# Patient Record
Sex: MALE | Race: WHITE | NOT HISPANIC OR LATINO | Employment: FULL TIME | URBAN - METROPOLITAN AREA
[De-identification: names, ages, dates, MRNs, and addresses within clinical notes are randomized per-mention and may not be internally consistent; named-entity substitution may affect disease eponyms.]

---

## 2017-02-20 ENCOUNTER — OFFICE VISIT (OUTPATIENT)
Dept: FAMILY MEDICINE | Facility: CLINIC | Age: 52
End: 2017-02-20
Payer: COMMERCIAL

## 2017-02-20 VITALS
TEMPERATURE: 99 F | WEIGHT: 247.69 LBS | OXYGEN SATURATION: 95 % | DIASTOLIC BLOOD PRESSURE: 70 MMHG | HEART RATE: 78 BPM | SYSTOLIC BLOOD PRESSURE: 133 MMHG | HEIGHT: 71 IN | BODY MASS INDEX: 34.68 KG/M2

## 2017-02-20 DIAGNOSIS — H65.193 ACUTE MUCOID OTITIS MEDIA OF BOTH EARS: Primary | ICD-10-CM

## 2017-02-20 PROCEDURE — 3078F DIAST BP <80 MM HG: CPT | Mod: S$GLB,,, | Performed by: NURSE PRACTITIONER

## 2017-02-20 PROCEDURE — 1160F RVW MEDS BY RX/DR IN RCRD: CPT | Mod: S$GLB,,, | Performed by: NURSE PRACTITIONER

## 2017-02-20 PROCEDURE — 3075F SYST BP GE 130 - 139MM HG: CPT | Mod: S$GLB,,, | Performed by: NURSE PRACTITIONER

## 2017-02-20 PROCEDURE — 99214 OFFICE O/P EST MOD 30 MIN: CPT | Mod: S$GLB,,, | Performed by: NURSE PRACTITIONER

## 2017-02-20 PROCEDURE — 99999 PR PBB SHADOW E&M-EST. PATIENT-LVL IV: CPT | Mod: PBBFAC,,, | Performed by: NURSE PRACTITIONER

## 2017-02-20 RX ORDER — CIPROFLOXACIN AND DEXAMETHASONE 3; 1 MG/ML; MG/ML
4 SUSPENSION/ DROPS AURICULAR (OTIC) 2 TIMES DAILY
Qty: 7.5 ML | Refills: 0 | Status: SHIPPED | OUTPATIENT
Start: 2017-02-20 | End: 2017-02-27

## 2017-02-20 RX ORDER — BENZONATATE 200 MG/1
200 CAPSULE ORAL EVERY 8 HOURS PRN
Qty: 30 CAPSULE | Refills: 0 | Status: SHIPPED | OUTPATIENT
Start: 2017-02-20 | End: 2017-03-02

## 2017-02-20 RX ORDER — LEVOFLOXACIN 500 MG/1
500 TABLET, FILM COATED ORAL DAILY
Qty: 7 TABLET | Refills: 0 | Status: SHIPPED | OUTPATIENT
Start: 2017-02-20 | End: 2017-02-27

## 2017-02-20 RX ORDER — LEVOCETIRIZINE DIHYDROCHLORIDE 5 MG/1
5 TABLET, FILM COATED ORAL NIGHTLY
Qty: 30 TABLET | Refills: 0 | Status: SHIPPED | OUTPATIENT
Start: 2017-02-20 | End: 2020-12-23 | Stop reason: ALTCHOICE

## 2017-02-20 NOTE — MR AVS SNAPSHOT
[facility]  [address]  [place] [address]  Phone: [phone]  Fax: [phone]                  Derek Joseph Weil   2017 3:15 PM   Office Visit    Description:  Male : 1965   Provider:  Melissa Cm NP   Department:  [facility]           Reason for Visit     LEFT EARACHE           Diagnoses this Visit        Comments    Acute mucoid otitis media of both ears    -  Primary            To Do List           Goals (5 Years of Data)     None      Follow-Up and Disposition     Return if symptoms worsen or fail to improve in 3-5 days.       These Medications        Disp Refills Start End    levoFLOXacin (LEVAQUIN) 500 MG tablet 7 tablet 0 2017    Take 1 tablet (500 mg total) by mouth once daily. - Oral    Pharmacy: [facility] [address] Ph #: [phone]       ciprofloxacin-dexamethasone 0.3-0.1% (CIPRODEX) 0.3-0.1 % DrpS 7.5 mL 0 2017    Place 4 drops into both ears 2 (two) times daily. - Both Ears    Pharmacy: [facility] [address] Ph #: [phone]       benzonatate (TESSALON) 200 MG capsule 30 capsule 0 2017 3/2/2017    Take 1 capsule (200 mg total) by mouth every 8 (eight) hours as needed for Cough. - Oral    Pharmacy: [facility] [address] Ph #: [phone]       levocetirizine (XYZAL) 5 MG tablet 30 tablet 0 2017    Take 1 tablet (5 mg total) by mouth every evening. - Oral    Pharmacy: [facility] [address] Ph #: [phone]         [facility]     [facility] -  Assistance  Registered nurses in the [facility] provide clinical advisement, health education, appointment booking, and other advisory services.  Call  for this free service at [phone].             Medications           Message regarding Medications     Verify the changes and/or additions to your medication regime listed below are the same as discussed with your clinician today.  If any of these changes or additions are incorrect, please notify your healthcare provider.        START taking these NEW medications        Refills    levoFLOXacin (LEVAQUIN) 500 MG tablet 0    Sig: Take 1 tablet (500 mg total) by mouth once daily.    Class: Normal    Route: Oral    ciprofloxacin-dexamethasone 0.3-0.1% (CIPRODEX) 0.3-0.1 % DrpS 0    Sig: Place 4 drops into both ears 2 (two) times daily.    Class: Normal    Route: Both Ears    benzonatate (TESSALON) 200 MG capsule 0    Sig: Take 1 capsule (200 mg total) by mouth every 8 (eight) hours as needed for Cough.    Class: Normal    Route: Oral    levocetirizine (XYZAL) 5 MG tablet 0    Sig: Take 1 tablet (5 mg total) by mouth every evening.    Class: Normal    Route: Oral           Verify that the below list of medications is an accurate representation of the medications you are currently taking.  If none reported, the list may be blank. If incorrect, please contact your healthcare provider. Carry this list with you in case of emergency.           Current Medications     acetaminophen (TYLENOL) 325 MG tablet Take by mouth. 2 Tablet Oral Every 4 hours    albuterol (PROVENTIL) 2.5 mg /3 mL (0.083 %) nebulizer solution Take 3 mLs (2.5 mg total) by nebulization every 6 (six) hours as needed for Wheezing or Shortness of Breath (also PRN cough).    amlodipine (NORVASC) 5 MG tablet Take by mouth. 1 Tablet Oral Every evening    atorvastatin (LIPITOR) 10 MG tablet     epinephrine (EPIPEN) 0.3 mg/0.3 mL AtIn Inject 0.3 mLs (0.3 mg total) into the muscle as needed (severe allergic reaction).    famotidine (PEPCID) 20 MG tablet Take 1 tablet (20 mg total) by mouth 2 (two) times daily.    nebivolol (BYSTOLIC) 5 MG Tab Take by mouth. 1  "Tablet Oral Every day    benzonatate (TESSALON) 200 MG capsule Take 1 capsule (200 mg total) by mouth every 8 (eight) hours as needed for Cough.    ciprofloxacin-dexamethasone 0.3-0.1% (CIPRODEX) 0.3-0.1 % DrpS Place 4 drops into both ears 2 (two) times daily.    levocetirizine (XYZAL) 5 MG tablet Take 1 tablet (5 mg total) by mouth every evening.    levoFLOXacin (LEVAQUIN) 500 MG tablet Take 1 tablet (500 mg total) by mouth once daily.           Clinical Reference Information           Your Vitals Were     BP Pulse Temp Height Weight SpO2    133/70 (BP Location: Right arm, Patient Position: Sitting, BP Method: Manual) 78 98.8 °F (37.1 °C) (Oral) 5' 11" (1.803 m) 112.4 kg (247 lb 11 oz) 95%    BMI                34.55 kg/m2          Blood Pressure          Most Recent Value    BP  133/70      Allergies as of 2/20/2017     Nsaids (Non-steroidal Anti-inflammatory Drug)    Penicillins    Aspirin    Ibuprofen    Phenytoin Sodium Extended      Immunizations Administered on Date of Encounter - 2/20/2017     None      [facility] Sign-Up     Activating your [facility] account is as easy as 1-2-3!     1) Visit [email or web address], select Sign Up Now, enter this activation code and your date of birth, then select Next.  [email or web address]  Expires: 4/6/2017  4:49 PM      2) Create a username and password to use when you visit [facility] in the future and select a security question in case you lose your password and select Next.    3) Enter your e-mail address and click Sign Up!    Additional Information  If you have questions, please e-mail [email or web address] or call [phone] to talk to our [facility] staff. Remember, [facility] is NOT to be used for urgent needs. For medical emergencies, dial 911.         Instructions      Middle Ear Infection (Adult)  You have an infection of the middle ear, the space behind the eardrum. This is also called acute otitis media (AOM). Sometimes it is caused by the common cold. This is because " congestion can block the internal passage (eustachian tube) that drains fluid from the middle ear. When the middle ear fills with fluid, bacteria can grow there and cause an infection. Oral antibiotics are used to treat this illness, not ear drops. Symptoms usually start to improve within 1 to 2 days of treatment.    Home care  The following are general care guidelines:  · Finish all of the antibiotic medicine given, even though you may feel better after the first few days.  · You may use over-the-counter medicine, such as acetaminophen or ibuprofen, to control pain and fever, unless something else was prescribed. If you have chronic liver or kidney disease or have ever had a stomach ulcer or gastrointestinal bleeding, talk with your healthcare provider before using these medicines. Do not give aspirin to anyone under 18 years of age who has a fever. It may cause severe illness or death.  Follow-up care  Follow up with your healthcare provider, or as advised, in 2 weeks if all symptoms have not gotten better, or if hearing doesn't go back to normal within 1 month.  When to seek medical advice  Call your healthcare provider right away if any of these occur:  · Ear pain gets worse or does not improve after 3 days of treatment  · Unusual drowsiness or confusion  · Neck pain, stiff neck, or headache  · Fluid or blood draining from the ear canal  · Fever of 100.4°F (38°C) or as advised   · Seizure  Date Last Reviewed: 6/1/2016 © 2000-2016 [employer]. [address] [place] [address]. All rights reserved. This information is not intended as a substitute for professional medical care. Always follow your healthcare professional's instructions.             Language Assistance Services     ATTENTION: Language assistance services are available, free of charge. Please call [phone].      ATENCIÓN: Si mishala carlos alberto, tiene a burch disposición servicios gratuitos de asistencia lingüística. Llame al  [phone].     RE Ý: N?u b?n nói Ti?ng Vi?t, có các d?ch v? h? tr? ngôn ng? mi?n phí dành cho b?n. G?i s? [phone].         [facility] complies with applicable Federal civil rights laws and does not discriminate on the basis of race, color, national origin, age, disability, or sex.

## 2017-02-20 NOTE — PATIENT INSTRUCTIONS
Middle Ear Infection (Adult)  You have an infection of the middle ear, the space behind the eardrum. This is also called acute otitis media (AOM). Sometimes it is caused by the common cold. This is because congestion can block the internal passage (eustachian tube) that drains fluid from the middle ear. When the middle ear fills with fluid, bacteria can grow there and cause an infection. Oral antibiotics are used to treat this illness, not ear drops. Symptoms usually start to improve within 1 to 2 days of treatment.    Home care  The following are general care guidelines:  · Finish all of the antibiotic medicine given, even though you may feel better after the first few days.  · You may use over-the-counter medicine, such as acetaminophen or ibuprofen, to control pain and fever, unless something else was prescribed. If you have chronic liver or kidney disease or have ever had a stomach ulcer or gastrointestinal bleeding, talk with your healthcare provider before using these medicines. Do not give aspirin to anyone under 18 years of age who has a fever. It may cause severe illness or death.  Follow-up care  Follow up with your healthcare provider, or as advised, in 2 weeks if all symptoms have not gotten better, or if hearing doesn't go back to normal within 1 month.  When to seek medical advice  Call your healthcare provider right away if any of these occur:  · Ear pain gets worse or does not improve after 3 days of treatment  · Unusual drowsiness or confusion  · Neck pain, stiff neck, or headache  · Fluid or blood draining from the ear canal  · Fever of 100.4°F (38°C) or as advised   · Seizure  Date Last Reviewed: 6/1/2016  © 5738-1958 [employer]. [address] [place] [address], [place] [address]. All rights reserved. This information is not intended as a substitute for professional medical care. Always follow your healthcare professional's instructions.

## 2017-02-20 NOTE — PROGRESS NOTES
Patient Name: Derek Joseph Weil    : 1965  MRN: [id number]    Subjective:  Alexander is a 51 y.o. male who presents today for     1. Ear pain left started 6 oclock day prior, stuffy nose, coughing x 1 week, taking earache otc drops, tylenol as needed ,     Past Medical History  Past Medical History   Diagnosis Date    Hypercholesteremia     Hypertension        Past Surgical History  Past Surgical History   Procedure Laterality Date    Appendectomy         Family History  Family History   Problem Relation Age of Onset    Family history unknown: Yes       Social History  Social History     Social History    Marital status:      Spouse name: N/A    Number of children: N/A    Years of education: N/A     Occupational History    Not on file.     Social History Main Topics    Smoking status: Never Smoker    Smokeless tobacco: Not on file    Alcohol use No    Drug use: No    Sexual activity: Not on file     Other Topics Concern    Not on file     Social History Narrative       Allergies  Review of patient's allergies indicates:   Allergen Reactions    Nsaids (non-steroidal anti-inflammatory drug) Anaphylaxis    Penicillins Anaphylaxis     Other reaction(s): Swelling    Aspirin Swelling     Other reaction(s): Swelling of throat, hives    Ibuprofen Hives     Other reaction(s): Swelling of throat, hives    Phenytoin sodium extended      Other reaction(s): Swelling    -reviewed and updated      Medications  Reviewed and updated.   Current Outpatient Prescriptions   Medication Sig Dispense Refill    acetaminophen (TYLENOL) 325 MG tablet Take by mouth. 2 Tablet Oral Every 4 hours      albuterol (PROVENTIL) 2.5 mg /3 mL (0.083 %) nebulizer solution Take 3 mLs (2.5 mg total) by nebulization every 6 (six) hours as needed for Wheezing or Shortness of Breath (also PRN cough). 1 Box 2    amlodipine (NORVASC) 5 MG tablet Take by mouth. 1 Tablet Oral Every evening      atorvastatin (LIPITOR) 10 MG tablet    "    epinephrine (EPIPEN) 0.3 mg/0.3 mL AtIn Inject 0.3 mLs (0.3 mg total) into the muscle as needed (severe allergic reaction). 2 each 0    famotidine (PEPCID) 20 MG tablet Take 1 tablet (20 mg total) by mouth 2 (two) times daily. 20 tablet 0    nebivolol (BYSTOLIC) 5 MG Tab Take by mouth. 1 Tablet Oral Every day      benzonatate (TESSALON) 200 MG capsule Take 1 capsule (200 mg total) by mouth every 8 (eight) hours as needed for Cough. 30 capsule 0    ciprofloxacin-dexamethasone 0.3-0.1% (CIPRODEX) 0.3-0.1 % DrpS Place 4 drops into both ears 2 (two) times daily. 7.5 mL 0    levocetirizine (XYZAL) 5 MG tablet Take 1 tablet (5 mg total) by mouth every evening. 30 tablet 0    levoFLOXacin (LEVAQUIN) 500 MG tablet Take 1 tablet (500 mg total) by mouth once daily. 7 tablet 0     No current facility-administered medications for this visit.          Review of Systems   Constitutional: Positive for chills and fever (102 fever that resting, tylenol).   HENT: Positive for congestion, ear pain and sore throat (sunday morning).    Respiratory: Positive for cough and shortness of breath. Negative for sputum production and wheezing.    Cardiovascular: Negative for chest pain.   Gastrointestinal: Positive for constipation (x 4-5 days).   Neurological: Positive for headaches (left throbbing).         Physical Exam  Visit Vitals    /70 (BP Location: Right arm, Patient Position: Sitting, BP Method: Manual)    Pulse 78    Temp 98.8 °F (37.1 °C) (Oral)    Ht 5' 11" (1.803 m)    Wt 112.4 kg (247 lb 11 oz)    SpO2 95%    BMI 34.55 kg/m2     Physical Exam   Constitutional: He appears well-developed. No distress.   HENT:   Head: Normocephalic.   Right Ear: Tympanic membrane is erythematous.   Left Ear: Tympanic membrane is perforated and erythematous.   Nose: Mucosal edema present.   Mouth/Throat: Posterior oropharyngeal erythema present.   Cardiovascular: Normal rate, regular rhythm and normal heart sounds.  "   Pulmonary/Chest: Effort normal and breath sounds normal.   Skin: He is not diaphoretic.         Assessment/Plan:  Derek Joseph Weil is a 51 y.o. male who presents today for :    Acute mucoid otitis media of both ears  -     levoFLOXacin (LEVAQUIN) 500 MG tablet; Take 1 tablet (500 mg total) by mouth once daily.  Dispense: 7 tablet; Refill: 0  -     ciprofloxacin-dexamethasone 0.3-0.1% (CIPRODEX) 0.3-0.1 % DrpS; Place 4 drops into both ears 2 (two) times daily.  Dispense: 7.5 mL; Refill: 0  -     benzonatate (TESSALON) 200 MG capsule; Take 1 capsule (200 mg total) by mouth every 8 (eight) hours as needed for Cough.  Dispense: 30 capsule; Refill: 0  -     levocetirizine (XYZAL) 5 MG tablet; Take 1 tablet (5 mg total) by mouth every evening.  Dispense: 30 tablet; Refill: 0      Return if symptoms worsen or fail to improve in 3-5 days.

## 2017-02-20 NOTE — LETTER
February 20, 2017      Alexanderk Weil   [address] [place] [address]             [place] [address]  [place] [address]  Phone: [phone]  Fax: [phone] Derek Weil    Was treated here on 02/20/2017    May Return to work/school on 2/22/2017 if symptoms improve.                Melissa Cm, NP

## 2017-03-09 DIAGNOSIS — J06.9 VIRAL URI: ICD-10-CM

## 2017-03-09 RX ORDER — ALBUTEROL SULFATE 0.83 MG/ML
SOLUTION RESPIRATORY (INHALATION)
Qty: 180 ML | Refills: 0 | Status: SHIPPED | OUTPATIENT
Start: 2017-03-09 | End: 2020-12-23 | Stop reason: ALTCHOICE

## 2017-07-11 ENCOUNTER — PATIENT OUTREACH (OUTPATIENT)
Dept: ADMINISTRATIVE | Facility: HOSPITAL | Age: 52
End: 2017-07-11

## 2020-11-02 ENCOUNTER — HOSPITAL ENCOUNTER (INPATIENT)
Facility: HOSPITAL | Age: 55
LOS: 9 days | Discharge: HOME | DRG: 177 | End: 2020-11-11
Attending: EMERGENCY MEDICINE | Admitting: EMERGENCY MEDICINE
Payer: COMMERCIAL

## 2020-11-02 DIAGNOSIS — U07.1 PNEUMONIA DUE TO COVID-19 VIRUS: ICD-10-CM

## 2020-11-02 DIAGNOSIS — Z20.822 SUSPECTED COVID-19 VIRUS INFECTION: ICD-10-CM

## 2020-11-02 DIAGNOSIS — R09.02 HYPOXIA: ICD-10-CM

## 2020-11-02 DIAGNOSIS — G47.33 OSA ON CPAP: ICD-10-CM

## 2020-11-02 DIAGNOSIS — R07.9 CHEST PAIN: ICD-10-CM

## 2020-11-02 DIAGNOSIS — I10 ESSENTIAL HYPERTENSION: ICD-10-CM

## 2020-11-02 DIAGNOSIS — J12.82 PNEUMONIA DUE TO COVID-19 VIRUS: ICD-10-CM

## 2020-11-02 DIAGNOSIS — J18.9 PNEUMONIA OF BOTH LOWER LOBES DUE TO INFECTIOUS ORGANISM: Primary | ICD-10-CM

## 2020-11-02 DIAGNOSIS — R53.1 WEAKNESS: ICD-10-CM

## 2020-11-02 DIAGNOSIS — E87.70 VOLUME OVERLOAD: ICD-10-CM

## 2020-11-02 LAB
ALBUMIN SERPL BCP-MCNC: 3.5 G/DL (ref 3.5–5.2)
ALP SERPL-CCNC: 39 U/L (ref 55–135)
ALT SERPL W/O P-5'-P-CCNC: 28 U/L (ref 10–44)
ANION GAP SERPL CALC-SCNC: 11 MMOL/L (ref 8–16)
AST SERPL-CCNC: 32 U/L (ref 10–40)
BASOPHILS # BLD AUTO: 0.02 K/UL (ref 0–0.2)
BASOPHILS NFR BLD: 0.3 % (ref 0–1.9)
BILIRUB SERPL-MCNC: 0.8 MG/DL (ref 0.1–1)
BNP SERPL-MCNC: 13 PG/ML (ref 0–99)
BUN SERPL-MCNC: 14 MG/DL (ref 6–20)
CALCIUM SERPL-MCNC: 8.8 MG/DL (ref 8.7–10.5)
CHLORIDE SERPL-SCNC: 98 MMOL/L (ref 95–110)
CK SERPL-CCNC: 592 U/L (ref 20–200)
CO2 SERPL-SCNC: 23 MMOL/L (ref 23–29)
CREAT SERPL-MCNC: 1.2 MG/DL (ref 0.5–1.4)
CRP SERPL-MCNC: 112.4 MG/L (ref 0–8.2)
CTP QC/QA: YES
CTP QC/QA: YES
DIFFERENTIAL METHOD: ABNORMAL
EOSINOPHIL # BLD AUTO: 0 K/UL (ref 0–0.5)
EOSINOPHIL NFR BLD: 0.1 % (ref 0–8)
ERYTHROCYTE [DISTWIDTH] IN BLOOD BY AUTOMATED COUNT: 12.5 % (ref 11.5–14.5)
EST. GFR  (AFRICAN AMERICAN): >60 ML/MIN/1.73 M^2
EST. GFR  (NON AFRICAN AMERICAN): >60 ML/MIN/1.73 M^2
FERRITIN SERPL-MCNC: 789 NG/ML (ref 20–300)
GLUCOSE SERPL-MCNC: 108 MG/DL (ref 70–110)
HCT VFR BLD AUTO: 45.2 % (ref 40–54)
HGB BLD-MCNC: 15.7 G/DL (ref 14–18)
IMM GRANULOCYTES # BLD AUTO: 0.02 K/UL (ref 0–0.04)
IMM GRANULOCYTES NFR BLD AUTO: 0.3 % (ref 0–0.5)
LACTATE SERPL-SCNC: 1.3 MMOL/L (ref 0.5–2.2)
LDH SERPL L TO P-CCNC: 346 U/L (ref 110–260)
LYMPHOCYTES # BLD AUTO: 0.6 K/UL (ref 1–4.8)
LYMPHOCYTES NFR BLD: 8.1 % (ref 18–48)
MCH RBC QN AUTO: 30.9 PG (ref 27–31)
MCHC RBC AUTO-ENTMCNC: 34.7 G/DL (ref 32–36)
MCV RBC AUTO: 89 FL (ref 82–98)
MONOCYTES # BLD AUTO: 0.6 K/UL (ref 0.3–1)
MONOCYTES NFR BLD: 8.3 % (ref 4–15)
NEUTROPHILS # BLD AUTO: 6.3 K/UL (ref 1.8–7.7)
NEUTROPHILS NFR BLD: 82.9 % (ref 38–73)
NRBC BLD-RTO: 0 /100 WBC
PLATELET # BLD AUTO: 159 K/UL (ref 150–350)
PMV BLD AUTO: 10.1 FL (ref 9.2–12.9)
POC MOLECULAR INFLUENZA A AGN: NEGATIVE
POC MOLECULAR INFLUENZA B AGN: NEGATIVE
POTASSIUM SERPL-SCNC: 3.6 MMOL/L (ref 3.5–5.1)
PROT SERPL-MCNC: 7.4 G/DL (ref 6–8.4)
RBC # BLD AUTO: 5.08 M/UL (ref 4.6–6.2)
SARS-COV-2 RDRP RESP QL NAA+PROBE: POSITIVE
SODIUM SERPL-SCNC: 132 MMOL/L (ref 136–145)
TROPONIN I SERPL DL<=0.01 NG/ML-MCNC: 0.02 NG/ML (ref 0–0.03)
WBC # BLD AUTO: 7.56 K/UL (ref 3.9–12.7)

## 2020-11-02 PROCEDURE — 87502 INFLUENZA DNA AMP PROBE: CPT

## 2020-11-02 PROCEDURE — 82550 ASSAY OF CK (CPK): CPT

## 2020-11-02 PROCEDURE — 25000003 PHARM REV CODE 250: Performed by: INTERNAL MEDICINE

## 2020-11-02 PROCEDURE — 86140 C-REACTIVE PROTEIN: CPT

## 2020-11-02 PROCEDURE — 99285 PR EMERGENCY DEPT VISIT,LEVEL V: ICD-10-PCS | Mod: ,,, | Performed by: EMERGENCY MEDICINE

## 2020-11-02 PROCEDURE — 83605 ASSAY OF LACTIC ACID: CPT

## 2020-11-02 PROCEDURE — 94640 AIRWAY INHALATION TREATMENT: CPT

## 2020-11-02 PROCEDURE — 63600175 PHARM REV CODE 636 W HCPCS: Performed by: EMERGENCY MEDICINE

## 2020-11-02 PROCEDURE — U0002 COVID-19 LAB TEST NON-CDC: HCPCS | Performed by: EMERGENCY MEDICINE

## 2020-11-02 PROCEDURE — 99233 PR SUBSEQUENT HOSPITAL CARE,LEVL III: ICD-10-PCS | Mod: ,,, | Performed by: INTERNAL MEDICINE

## 2020-11-02 PROCEDURE — 87040 BLOOD CULTURE FOR BACTERIA: CPT

## 2020-11-02 PROCEDURE — 83880 ASSAY OF NATRIURETIC PEPTIDE: CPT

## 2020-11-02 PROCEDURE — 99285 EMERGENCY DEPT VISIT HI MDM: CPT | Mod: 25

## 2020-11-02 PROCEDURE — 99285 EMERGENCY DEPT VISIT HI MDM: CPT | Mod: ,,, | Performed by: EMERGENCY MEDICINE

## 2020-11-02 PROCEDURE — 63600175 PHARM REV CODE 636 W HCPCS: Performed by: INTERNAL MEDICINE

## 2020-11-02 PROCEDURE — 80053 COMPREHEN METABOLIC PANEL: CPT

## 2020-11-02 PROCEDURE — 85025 COMPLETE CBC W/AUTO DIFF WBC: CPT

## 2020-11-02 PROCEDURE — 94761 N-INVAS EAR/PLS OXIMETRY MLT: CPT

## 2020-11-02 PROCEDURE — 93010 ELECTROCARDIOGRAM REPORT: CPT | Mod: ,,, | Performed by: INTERNAL MEDICINE

## 2020-11-02 PROCEDURE — 82728 ASSAY OF FERRITIN: CPT

## 2020-11-02 PROCEDURE — 20600001 HC STEP DOWN PRIVATE ROOM

## 2020-11-02 PROCEDURE — 93010 EKG 12-LEAD: ICD-10-PCS | Mod: ,,, | Performed by: INTERNAL MEDICINE

## 2020-11-02 PROCEDURE — 96365 THER/PROPH/DIAG IV INF INIT: CPT

## 2020-11-02 PROCEDURE — 25000242 PHARM REV CODE 250 ALT 637 W/ HCPCS: Performed by: INTERNAL MEDICINE

## 2020-11-02 PROCEDURE — 99233 SBSQ HOSP IP/OBS HIGH 50: CPT | Mod: ,,, | Performed by: INTERNAL MEDICINE

## 2020-11-02 PROCEDURE — 83615 LACTATE (LD) (LDH) ENZYME: CPT

## 2020-11-02 PROCEDURE — 93005 ELECTROCARDIOGRAM TRACING: CPT

## 2020-11-02 PROCEDURE — 84484 ASSAY OF TROPONIN QUANT: CPT

## 2020-11-02 PROCEDURE — 25000003 PHARM REV CODE 250: Performed by: EMERGENCY MEDICINE

## 2020-11-02 RX ORDER — LEVOFLOXACIN 5 MG/ML
750 INJECTION, SOLUTION INTRAVENOUS
Status: DISCONTINUED | OUTPATIENT
Start: 2020-11-02 | End: 2020-11-02

## 2020-11-02 RX ORDER — IBUPROFEN 200 MG
16 TABLET ORAL
Status: DISCONTINUED | OUTPATIENT
Start: 2020-11-02 | End: 2020-11-11 | Stop reason: HOSPADM

## 2020-11-02 RX ORDER — ATORVASTATIN CALCIUM 20 MG/1
40 TABLET, FILM COATED ORAL NIGHTLY
Status: DISCONTINUED | OUTPATIENT
Start: 2020-11-02 | End: 2020-11-02

## 2020-11-02 RX ORDER — ENOXAPARIN SODIUM 100 MG/ML
40 INJECTION SUBCUTANEOUS EVERY 24 HOURS
Status: DISCONTINUED | OUTPATIENT
Start: 2020-11-02 | End: 2020-11-11 | Stop reason: HOSPADM

## 2020-11-02 RX ORDER — NEBIVOLOL 5 MG/1
5 TABLET ORAL DAILY
Status: DISCONTINUED | OUTPATIENT
Start: 2020-11-02 | End: 2020-11-02

## 2020-11-02 RX ORDER — SODIUM CHLORIDE 0.9 % (FLUSH) 0.9 %
10 SYRINGE (ML) INJECTION
Status: DISCONTINUED | OUTPATIENT
Start: 2020-11-02 | End: 2020-11-11 | Stop reason: HOSPADM

## 2020-11-02 RX ORDER — HYDROXYZINE PAMOATE 25 MG/1
50 CAPSULE ORAL NIGHTLY PRN
Status: DISCONTINUED | OUTPATIENT
Start: 2020-11-02 | End: 2020-11-11 | Stop reason: HOSPADM

## 2020-11-02 RX ORDER — TALC
6 POWDER (GRAM) TOPICAL NIGHTLY PRN
Status: DISCONTINUED | OUTPATIENT
Start: 2020-11-02 | End: 2020-11-11 | Stop reason: HOSPADM

## 2020-11-02 RX ORDER — BENZONATATE 100 MG/1
100 CAPSULE ORAL 3 TIMES DAILY PRN
Status: DISCONTINUED | OUTPATIENT
Start: 2020-11-02 | End: 2020-11-11 | Stop reason: HOSPADM

## 2020-11-02 RX ORDER — GUAIFENESIN/DEXTROMETHORPHAN 100-10MG/5
5 SYRUP ORAL EVERY 6 HOURS
Status: DISCONTINUED | OUTPATIENT
Start: 2020-11-02 | End: 2020-11-04

## 2020-11-02 RX ORDER — GLUCAGON 1 MG
1 KIT INJECTION
Status: DISCONTINUED | OUTPATIENT
Start: 2020-11-02 | End: 2020-11-11 | Stop reason: HOSPADM

## 2020-11-02 RX ORDER — ONDANSETRON 2 MG/ML
4 INJECTION INTRAMUSCULAR; INTRAVENOUS EVERY 8 HOURS PRN
Status: DISCONTINUED | OUTPATIENT
Start: 2020-11-02 | End: 2020-11-11 | Stop reason: HOSPADM

## 2020-11-02 RX ORDER — NEBIVOLOL 2.5 MG/1
2.5 TABLET ORAL DAILY
Status: DISCONTINUED | OUTPATIENT
Start: 2020-11-02 | End: 2020-11-11 | Stop reason: HOSPADM

## 2020-11-02 RX ORDER — POLYETHYLENE GLYCOL 3350 17 G/17G
17 POWDER, FOR SOLUTION ORAL 2 TIMES DAILY PRN
Status: DISCONTINUED | OUTPATIENT
Start: 2020-11-02 | End: 2020-11-11 | Stop reason: HOSPADM

## 2020-11-02 RX ORDER — AMLODIPINE BESYLATE 5 MG/1
5 TABLET ORAL NIGHTLY
Status: DISCONTINUED | OUTPATIENT
Start: 2020-11-02 | End: 2020-11-02

## 2020-11-02 RX ORDER — AMLODIPINE BESYLATE 5 MG/1
5 TABLET ORAL NIGHTLY
Status: DISCONTINUED | OUTPATIENT
Start: 2020-11-02 | End: 2020-11-07

## 2020-11-02 RX ORDER — LOPERAMIDE HYDROCHLORIDE 2 MG/1
2 CAPSULE ORAL 4 TIMES DAILY PRN
Status: DISCONTINUED | OUTPATIENT
Start: 2020-11-02 | End: 2020-11-11 | Stop reason: HOSPADM

## 2020-11-02 RX ORDER — NEBIVOLOL 5 MG/1
5 TABLET ORAL DAILY
Status: DISCONTINUED | OUTPATIENT
Start: 2020-11-03 | End: 2020-11-02

## 2020-11-02 RX ORDER — ACETAMINOPHEN 500 MG
1000 TABLET ORAL
Status: COMPLETED | OUTPATIENT
Start: 2020-11-02 | End: 2020-11-02

## 2020-11-02 RX ORDER — ASCORBIC ACID 500 MG
500 TABLET ORAL 2 TIMES DAILY
Status: DISCONTINUED | OUTPATIENT
Start: 2020-11-02 | End: 2020-11-11 | Stop reason: HOSPADM

## 2020-11-02 RX ORDER — CHOLECALCIFEROL (VITAMIN D3) 25 MCG
1000 TABLET ORAL DAILY
Status: DISCONTINUED | OUTPATIENT
Start: 2020-11-03 | End: 2020-11-11 | Stop reason: HOSPADM

## 2020-11-02 RX ORDER — IBUPROFEN 200 MG
24 TABLET ORAL
Status: DISCONTINUED | OUTPATIENT
Start: 2020-11-02 | End: 2020-11-11 | Stop reason: HOSPADM

## 2020-11-02 RX ORDER — ACETAMINOPHEN 325 MG/1
650 TABLET ORAL EVERY 4 HOURS PRN
Status: DISCONTINUED | OUTPATIENT
Start: 2020-11-02 | End: 2020-11-11 | Stop reason: HOSPADM

## 2020-11-02 RX ORDER — IPRATROPIUM BROMIDE AND ALBUTEROL SULFATE 2.5; .5 MG/3ML; MG/3ML
3 SOLUTION RESPIRATORY (INHALATION) EVERY 6 HOURS
Status: DISCONTINUED | OUTPATIENT
Start: 2020-11-02 | End: 2020-11-09

## 2020-11-02 RX ORDER — LEVOFLOXACIN 5 MG/ML
750 INJECTION, SOLUTION INTRAVENOUS
Status: COMPLETED | OUTPATIENT
Start: 2020-11-03 | End: 2020-11-06

## 2020-11-02 RX ADMIN — NEBIVOLOL HYDROCHLORIDE 2.5 MG: 2.5 TABLET ORAL at 09:11

## 2020-11-02 RX ADMIN — IPRATROPIUM BROMIDE AND ALBUTEROL SULFATE 3 ML: .5; 2.5 SOLUTION RESPIRATORY (INHALATION) at 07:11

## 2020-11-02 RX ADMIN — GUAIFENESIN AND DEXTROMETHORPHAN 5 ML: 100; 10 SYRUP ORAL at 06:11

## 2020-11-02 RX ADMIN — AMLODIPINE BESYLATE 5 MG: 5 TABLET ORAL at 06:11

## 2020-11-02 RX ADMIN — ACETAMINOPHEN 650 MG: 325 TABLET ORAL at 09:11

## 2020-11-02 RX ADMIN — ENOXAPARIN SODIUM 40 MG: 40 INJECTION SUBCUTANEOUS at 09:11

## 2020-11-02 RX ADMIN — ACETAMINOPHEN 1000 MG: 500 TABLET ORAL at 12:11

## 2020-11-02 RX ADMIN — DEXAMETHASONE 6 MG: 4 TABLET ORAL at 04:11

## 2020-11-02 RX ADMIN — LEVOFLOXACIN 750 MG: 750 INJECTION, SOLUTION INTRAVENOUS at 02:11

## 2020-11-02 RX ADMIN — MELATONIN TAB 3 MG 6 MG: 3 TAB at 09:11

## 2020-11-02 RX ADMIN — BENZONATATE 100 MG: 100 CAPSULE, LIQUID FILLED ORAL at 09:11

## 2020-11-02 RX ADMIN — Medication 500 MG: at 04:11

## 2020-11-02 NOTE — ED NOTES
Patient identifiers verified and correct for Derek Joseph Weil.    LOC: The patient is awake, alert and aware of environment with an appropriate affect, the patient is oriented x 3 and speaking appropriately.  APPEARANCE: Patient resting comfortably and in mild acute distress, patient is clean and well groomed, patient's clothing is properly fastened.  SKIN: The skin is warm and dry, color consistent with ethnicity, patient has normal skin turgor and moist mucus membranes, skin intact, no breakdown or bruising noted.  MUSCULOSKELETAL: Patient moving all extremities spontaneously, no obvious swelling or deformities noted.  RESPIRATORY: Airway is open and patent, respirations are spontaneous, patient has a mildly accelerated effort and rate, no accessory muscle use noted, bilateral breath sounds with mild crackles noted. Pt experiencing mild SOB and pain with inspiration.  CARDIAC: Patient has a normal rate and regular rhythm, no periphreal edema noted, capillary refill < 3 seconds.  ABDOMEN: Soft and non tender to palpation, no distention noted, normoactive bowel sounds present in all four quadrants.  NEUROLOGIC: PERRL, 5mm bilaterally, eyes open spontaneously, behavior appropriate to situation, follows commands, facial expression symmetrical, bilateral hand grasp equal and even, purposeful motor response noted, normal sensation in all extremities when touched with a finger.

## 2020-11-02 NOTE — H&P
[facility] Medicine                                                         History and Physical       Team: [facility] HOSP MED R Сергей Sen MD     Admit Date: 11/2/2020   HOD: 0       LIZ: 11/5/2020     Primary care Physician: RL ANGEL, PharmD    Principal Problem: Pneumonia of both lower lobes due to infectious organism      Patient information was obtained from patient, past medical records and ER records.      Code status: Full Code      HPI:       Mr Derek Joseph Weil is a 54 y.o. male with hx of hypertension presenting with [facility] with progressively worsening dry cough with associated fevers, chills, malaise, weakness, insomnia, dyspnea (dalia on exertion), chest congestion, decreased po intake, intermittent diarrhea since 10/28 (6 day hx). He stated that he was otherwise well before his symptoms. He denied any dizziness, syncope, trauma, pleurisy, N/V, bleeds. States these symptoms were similar to his piror episode of PNA 1 yr ago. His wife is diagnosed with COVID and has been symptomatic for the past week, now admitted to hospital. With concerns for COVID PNA, he presenting to the ED. Nonsmoker. No alcohol use or illicit drug use.    In the ED, patient was febrile  Tm 102.4, normotensive, saturating well on RA. Labs normal for normal wbc, COVID+, Na 132, .4, Ferritin 789, , . CXR with multifocal PNA. Admitted to hospital medicine for COVID PNA.      Review of Systems:  Pain Scale: 0 /10   Constitutional: fevers, chills, malaise, weakness, sleep disturbances   Respiratory: dry cough, shortness of breath, READ, chest congestion  Cardiovascular: no chest pain or palpitations  Gastrointestinal: intermittent diarrhea, decreased po intake, no nausea or vomiting, no abdominal pain  Genitourinary: no hematuria or dysuria  Integument/Breast: no rash or pruritis  Hematologic/Lymphatic: no easy bruising or  lymphadenopathy  Musculoskeletal: no arthralgias or myalgias  Neurological: no seizures or tremors  Behavioral/Psych: no depression or anxiety      PAST HISTORY:     Past Medical History:   Diagnosis Date    Hypercholesteremia     Hypertension        Past Surgical History:   Procedure Laterality Date    APPENDECTOMY         Family History   Family history unknown: Yes       Social History     Socioeconomic History    Marital status:      Spouse name: Not on file    Number of children: Not on file    Years of education: Not on file    Highest education level: Not on file   Occupational History    Not on file   Social Needs    Financial resource strain: Not on file    Food insecurity     Worry: Not on file     Inability: Not on file    Transportation needs     Medical: Not on file     Non-medical: Not on file   Tobacco Use    Smoking status: Never Smoker   Substance and Sexual Activity    Alcohol use: No    Drug use: No    Sexual activity: Not on file   Lifestyle    Physical activity     Days per week: Not on file     Minutes per session: Not on file    Stress: Not on file   Relationships    Social connections     Talks on phone: Not on file     Gets together: Not on file     Attends [employer] service: Not on file     Active member of club or organization: Not on file     Attends meetings of clubs or organizations: Not on file     Relationship status: Not on file   Other Topics Concern    Not on file   Social History Narrative    Not on file         MEDICATIONS and ALLERGIES:   Reviewed    No current facility-administered medications on file prior to encounter.      Current Outpatient Medications on File Prior to Encounter   Medication Sig Dispense Refill    acetaminophen (TYLENOL) 325 MG tablet Take by mouth. 2 Tablet Oral Every 4 hours      albuterol (PROVENTIL) 2.5 mg /3 mL (0.083 %) nebulizer solution USE 1 VIAL(3MLS) IN NEBULIZER EVERY 6 HOURS AS NEEDED FOR WHEEZING OR SHORTNESS OF  BREATH( ALSO AS NEEDED FOR COUGH) 180 mL 0    amlodipine (NORVASC) 5 MG tablet Take by mouth. 1 Tablet Oral Every evening      atorvastatin (LIPITOR) 10 MG tablet       epinephrine (EPIPEN) 0.3 mg/0.3 mL AtIn Inject 0.3 mLs (0.3 mg total) into the muscle as needed (severe allergic reaction). 2 each 0    famotidine (PEPCID) 20 MG tablet Take 1 tablet (20 mg total) by mouth 2 (two) times daily. 20 tablet 0    levocetirizine (XYZAL) 5 MG tablet Take 1 tablet (5 mg total) by mouth every evening. 30 tablet 0    nebivolol (BYSTOLIC) 5 MG Tab Take by mouth. 1 Tablet Oral Every day          Review of patient's allergies indicates:   Allergen Reactions    Nsaids (non-steroidal anti-inflammatory drug) Anaphylaxis    Penicillins Anaphylaxis     Other reaction(s): Swelling    Aspirin Swelling     Other reaction(s): Swelling of throat, hives    Ibuprofen Hives     Other reaction(s): Swelling of throat, hives    Phenytoin sodium extended      Other reaction(s): Swelling           PHYSICAL EXAM:     Temp:  [100.4 °F (38 °C)-102.4 °F (39.1 °C)]   Pulse:  [88-96]   Resp:  [19-22]   BP: (146-158)/(71-84)   SpO2:  [94 %-95 %]   Body mass index is 34.87 kg/m².   No intake or output data in the 24 hours ending 11/02/20 1616    General appearance: no distress, non acute  Mental status: Alert and oriented x 4  HEENT:  conjunctivae/corneas clear, PERRL  Neck: supple, thyroid not enlarged  Pulm:  Saturating well on RA, normal respiratory effort, coarse BS throughout   Card: RRR, S1, S2 normal, no murmur, click, rub or gallop  Abd: soft, NT, ND, BS present; no masses, no organomegaly  Ext: no c/c/e  Pulses: 2+, symmetric  Skin: color, texture, turgor normal. No rashes or lesions  Neuro: Cranial Nerves grossly intact, no focal numbness or weakness, normal strength and tone       LABS and IMAGING:       Recent Results (from the past 24 hour(s))   POCT COVID-19 Rapid Screening    Collection Time: 11/02/20 12:46 PM   Result Value Ref  Range    POC Rapid COVID Positive (A) Negative     Acceptable Yes    CBC auto differential    Collection Time: 11/02/20  1:10 PM   Result Value Ref Range    WBC 7.56 3.90 - 12.70 K/uL    RBC 5.08 4.60 - 6.20 M/uL    Hemoglobin 15.7 14.0 - 18.0 g/dL    Hematocrit 45.2 40.0 - 54.0 %    MCV 89 82 - 98 fL    MCH 30.9 27.0 - 31.0 pg    MCHC 34.7 32.0 - 36.0 g/dL    RDW 12.5 11.5 - 14.5 %    Platelets 159 150 - 350 K/uL    MPV 10.1 9.2 - 12.9 fL    Immature Granulocytes 0.3 0.0 - 0.5 %    Gran # (ANC) 6.3 1.8 - 7.7 K/uL    Immature Grans (Abs) 0.02 0.00 - 0.04 K/uL    Lymph # 0.6 (L) 1.0 - 4.8 K/uL    Mono # 0.6 0.3 - 1.0 K/uL    Eos # 0.0 0.0 - 0.5 K/uL    Baso # 0.02 0.00 - 0.20 K/uL    nRBC 0 0 /100 WBC    Gran % 82.9 (H) 38.0 - 73.0 %    Lymph % 8.1 (L) 18.0 - 48.0 %    Mono % 8.3 4.0 - 15.0 %    Eosinophil % 0.1 0.0 - 8.0 %    Basophil % 0.3 0.0 - 1.9 %    Differential Method Automated    Comprehensive metabolic panel    Collection Time: 11/02/20  1:10 PM   Result Value Ref Range    Sodium 132 (L) 136 - 145 mmol/L    Potassium 3.6 3.5 - 5.1 mmol/L    Chloride 98 95 - 110 mmol/L    CO2 23 23 - 29 mmol/L    Glucose 108 70 - 110 mg/dL    BUN 14 6 - 20 mg/dL    Creatinine 1.2 0.5 - 1.4 mg/dL    Calcium 8.8 8.7 - 10.5 mg/dL    Total Protein 7.4 6.0 - 8.4 g/dL    Albumin 3.5 3.5 - 5.2 g/dL    Total Bilirubin 0.8 0.1 - 1.0 mg/dL    Alkaline Phosphatase 39 (L) 55 - 135 U/L    AST 32 10 - 40 U/L    ALT 28 10 - 44 U/L    Anion Gap 11 8 - 16 mmol/L    eGFR if African American >60.0 >60 mL/min/1.73 m^2    eGFR if non African American >60.0 >60 mL/min/1.73 m^2   C-Reactive Protein    Collection Time: 11/02/20  1:10 PM   Result Value Ref Range    .4 (H) 0.0 - 8.2 mg/L   Ferritin    Collection Time: 11/02/20  1:10 PM   Result Value Ref Range    Ferritin 789 (H) 20.0 - 300.0 ng/mL   Lactate Dehydrogenase    Collection Time: 11/02/20  1:10 PM   Result Value Ref Range     (H) 110 - 260 U/L   CK     Collection Time: 11/02/20  1:10 PM   Result Value Ref Range     (H) 20 - 200 U/L   Lactic Acid, Plasma    Collection Time: 11/02/20  1:10 PM   Result Value Ref Range    Lactate (Lactic Acid) 1.3 0.5 - 2.2 mmol/L   Troponin I    Collection Time: 11/02/20  1:10 PM   Result Value Ref Range    Troponin I 0.018 0.000 - 0.026 ng/mL   Brain natriuretic peptide    Collection Time: 11/02/20  1:10 PM   Result Value Ref Range    BNP 13 0 - 99 pg/mL   POCT Influenza A/B Molecular    Collection Time: 11/02/20  2:10 PM   Result Value Ref Range    POC Molecular Influenza A Ag Negative Negative, Not Reported    POC Molecular Influenza B Ag Negative Negative, Not Reported     Acceptable Yes        No results for input(s): POCTGLUCOSE in the last 168 hours.    Active Hospital Problems    Diagnosis  POA    Pneumonia of both lower lobes due to infectious organism [J18.9]  Yes      Resolved Hospital Problems   No resolved problems to display.           ASSESSMENT and PLAN:     COVID-19 Virus Infection:  Viral Pneumonia due to COVID-19:  -Pt tested for COVID-19 and noted to be positive on 11/2  -Isolation: Airborne/Droplet. Surgical mask on patient. Notify Infection Control  -Management: per [facility] COVID Treatment Protocol (4/15/20)  -Monitoring: Telemetry & Continuous Pulse Oximetry  -Antibiotics: Started on Levaquin in the ED, continue for total 5 days   -Nutrition:    -Multivitamin PO daily - ordered    -Add Boost supplement  - ordered    -Vitamin D 1000IU daily if deficient  - ordered    -Ascorbic acid 500mg PO bid  - ordered   -Supportive Care:   -Acetaminophen 650mg PO Q6hr PRN fever/headache  - ordered    -Loperamide PRN viral diarrhea  - ordered    -Robitussin, tessalon perls for cough  - ordered    -IVF if indicated, restrictive strategy preferred, no maintenance IV if able   -Other Therapies:  -Atorvastatin 40mg po daily - holding with elevated CPK   -Due to hypoxia, pt started on dexamethasone 6mg PO  daily up to 10 days or until pt is discharged from hospital (whichever is sooner)  - ordered    -Pt meets criteria for remdesivir / tocilizumab. Pt provided verbal consent to start this medication and it being ordered/dosed per ID.   - ordered , patient hesitant at this time, will think about it    Acute Respiratory Distress  -Not hypoxic at this time on rest or ambulation    -RT consult via Respiratory Communication for COVID Protocols  -If wheezing, albuterol INH Q6h scheduled & PRN  -Incentive Spirometer Q4h  -Flutter Valve Q4h  -Continuous pulse oximetry; titrate oxygen to keep sats between 92-96%  -Wean off O2 as tolerated    -Supplemental O2 via LFNC, VentiMask, or HFNC (see Respiratory Support Oxygen Therapies)  -Proning Protocol if patient is a candidate with GCS >13 and able to self-prone (see HM Proning Protocol)  -If deterioration, may warrant trial of NIPPV and transfer to [facility] pressure room or immediate ICU consult    Hypertension  - resume home meds (amlodipine 5 mg and nebivolol 2.5 mg daily)    Goals of care, counseling/discussion  -Reviewed the typical clinical course of COVID19 with patient, including the potential for acute decompensation requiring intubation and mechanical ventilation  -Discussed again as part of routine daily evaluation, patient/POA maintains code status of FULL CODE      VTE High Risk Prophylaxis: enoxaparin 40mg sq QHS @ 2100 (bundled care) if GFR >30  Dispo: DC home once medically ready.       Сергей Sen MD  [facility] Medicine Staff  Pager [phone]

## 2020-11-02 NOTE — PLAN OF CARE
Problem: Adult Inpatient Plan of Care  Goal: Plan of Care Review  Outcome: Ongoing, Progressing  Goal: Patient-Specific Goal (Individualization)  Outcome: Ongoing, Progressing  Goal: Absence of Hospital-Acquired Illness or Injury  Outcome: Ongoing, Progressing  Goal: Optimal Comfort and Wellbeing  Outcome: Ongoing, Progressing  Goal: Readiness for Transition of Care  Outcome: Ongoing, Progressing  Goal: Rounds/Family Conference  Outcome: Ongoing, Progressing     Problem: Infection  Goal: Infection Symptom Resolution  Outcome: Ongoing, Progressing     Problem: Fluid Imbalance (Pneumonia)  Goal: Fluid Balance  Outcome: Ongoing, Progressing     Problem: Infection (Pneumonia)  Goal: Resolution of Infection Signs/Symptoms  Outcome: Ongoing, Progressing     Problem: Respiratory Compromise (Pneumonia)  Goal: Effective Oxygenation and Ventilation  Outcome: Ongoing, Progressing     Problem: Fall Injury Risk  Goal: Absence of Fall and Fall-Related Injury  Outcome: Ongoing, Progressing

## 2020-11-02 NOTE — ED PROVIDER NOTES
Encounter Date: 11/2/2020    SCRIBE #1 NOTE: I, Mayrana Banegas, am scribing for, and in the presence of,  Yonathan Kang MD. I have scribed the entire note.       History     Chief Complaint   Patient presents with    COVID-19 Concerns     wife is +     Time patient was seen by the provider: 12:29 PM      The patient is a 54 y.o. male with past medical history of hypertension, who presents to the ED with a complaint of 3 days of fever, cough, shortness of breath. He feels extremely weak. He had similar symptoms when he had pneumonia last month. His wife has COVID-19 and is being admitted to the hospital today.     The history is provided by the patient and medical records. No  was used.     Review of patient's allergies indicates:   Allergen Reactions    Nsaids (non-steroidal anti-inflammatory drug) Anaphylaxis    Penicillins Anaphylaxis     Other reaction(s): Swelling    Aspirin Swelling     Other reaction(s): Swelling of throat, hives    Ibuprofen Hives     Other reaction(s): Swelling of throat, hives    Phenytoin sodium extended      Other reaction(s): Swelling     Past Medical History:   Diagnosis Date    Hypercholesteremia     Hypertension      Past Surgical History:   Procedure Laterality Date    APPENDECTOMY       Family History   Family history unknown: Yes     Social History     Tobacco Use    Smoking status: Never Smoker   Substance Use Topics    Alcohol use: No    Drug use: No     Review of Systems   Constitutional: Positive for fever.   HENT: Negative for sore throat.    Respiratory: Positive for cough and shortness of breath.    Cardiovascular: Negative for chest pain.   Gastrointestinal: Negative for nausea.   Genitourinary: Negative for dysuria.   Musculoskeletal: Negative for back pain.   Skin: Negative for rash.   Neurological: Negative for weakness.   Hematological: Does not bruise/bleed easily.       Physical Exam     Initial Vitals [11/02/20 1155]   BP Pulse  Resp Temp SpO2   (!) 158/84 88 (!) 22 (!) 101.4 °F (38.6 °C) 95 %      MAP       --         Physical Exam    Nursing note and vitals reviewed.  Constitutional: He appears well-developed and well-nourished. He is not diaphoretic. No distress.   He is short of breath and uncomfortable   HENT:   Head: Normocephalic and atraumatic.   Mouth/Throat: Oropharynx is clear and moist.   Eyes: Conjunctivae and EOM are normal. Pupils are equal, round, and reactive to light.   Neck: Normal range of motion. Neck supple. No JVD present.   Cardiovascular: Normal rate, regular rhythm and normal heart sounds.   No murmur heard.  Pulmonary/Chest: Breath sounds normal. No respiratory distress. He has no wheezes. He has no rhonchi. He has no rales.   Abdominal: Soft. Bowel sounds are normal. He exhibits no distension and no mass. There is no abdominal tenderness. There is no rebound and no guarding.   Musculoskeletal: Normal range of motion. No tenderness or edema.   Neurological: He is alert and oriented to person, place, and time. He has normal strength. No cranial nerve deficit or sensory deficit.   Skin: Skin is warm and dry. No rash noted.   Psychiatric: He has a normal mood and affect.         ED Course   Procedures  Labs Reviewed   CBC W/ AUTO DIFFERENTIAL - Abnormal; Notable for the following components:       Result Value    Lymph # 0.6 (*)     Gran % 82.9 (*)     Lymph % 8.1 (*)     All other components within normal limits   COMPREHENSIVE METABOLIC PANEL - Abnormal; Notable for the following components:    Sodium 132 (*)     Alkaline Phosphatase 39 (*)     All other components within normal limits   C-REACTIVE PROTEIN - Abnormal; Notable for the following components:    .4 (*)     All other components within normal limits   FERRITIN - Abnormal; Notable for the following components:    Ferritin 789 (*)     All other components within normal limits   LACTATE DEHYDROGENASE - Abnormal; Notable for the following components:      (*)     All other components within normal limits   CK - Abnormal; Notable for the following components:     (*)     All other components within normal limits   SARS-COV-2 RDRP GENE - Abnormal; Notable for the following components:    POC Rapid COVID Positive (*)     All other components within normal limits    Narrative:     This test utilizes isothermal nucleic acid amplification   technology to detect the SARS-CoV-2 RdRp nucleic acid segment.   The analytical sensitivity (limit of detection) is 125 genome   equivalents/mL.   A POSITIVE result implies infection with the SARS-CoV-2 virus;   the patient is presumed to be contagious.     A NEGATIVE result means that SARS-CoV-2 nucleic acids are not   present above the limit of detection. A NEGATIVE result should be   treated as presumptive. It does not rule out the possibility of   COVID-19 and should not be the sole basis for treatment decisions.   If COVID-19 is strongly suspected based on clinical and exposure   history, re-testing using an alternate molecular assay should be   considered.   This test is only for use under the Food and Drug   Administration s Emergency Use Authorization (EUA).   Commercial kits are provided by [employer].   Performance characteristics of the EUA have been independently   verified by [facility] Department of   Pathology and Laboratory Medicine.   _________________________________________________________________   The authorized Fact Sheet for Healthcare Providers and the authorized Fact   Sheet for Patients of the ID NOW COVID-19 are available on the FDA   website:     https://www.fda.gov/media/396222/download  https://www.fda.gov/media/934916/download         CULTURE, BLOOD   CULTURE, BLOOD   CULTURE, RESPIRATORY   LACTIC ACID, PLASMA   TROPONIN I   B-TYPE NATRIURETIC PEPTIDE   POCT INFLUENZA A/B MOLECULAR     EKG Readings: (Independently Interpreted)   Initial Reading: No STEMI. Rhythm: Normal  Sinus Rhythm. Heart Rate: 77.   No ischemic changes     ECG Results          EKG 12-lead (In process)  Result time 11/02/20 15:11:57    In process by Interface, Lab In [facility] (11/02/20 15:11:57)                 Narrative:    Test Reason : Z20.828,    Vent. Rate : 076 BPM     Atrial Rate : 076 BPM     P-R Int : 126 ms          QRS Dur : 096 ms      QT Int : 392 ms       P-R-T Axes : 073 054 068 degrees     QTc Int : 441 ms    Normal sinus rhythm  Normal ECG  When compared with ECG of 25-AUG-2016 19:39,  No significant change was found    Referred By: [email or web address]   SELF           Confirmed By:                             Imaging Results          X-Ray Chest AP Portable (Final result)  Result time 11/02/20 12:54:14    Final result by Carlos Vuong MD (11/02/20 12:54:14)                 Impression:      1. Coarse interstitial attenuation bilaterally may reflect edema or other nonspecific pneumonitis.  Patchy consolidative opacity is noted projected over the left mid and lower lung zones concerning for infectious process.  Additional early consolidative change may be present projected over the right lower lung zone.  Correlation is advised.      Electronically signed by: Carlos Vuong MD  Date:    11/02/2020  Time:    12:54             Narrative:    EXAMINATION:  XR CHEST AP PORTABLE    CLINICAL HISTORY:  Suspected Covid-19 Virus Infection;    TECHNIQUE:  Single frontal view of the chest was performed.    COMPARISON:  08/25/2016    FINDINGS:  The cardiomediastinal silhouette is not enlarged.  There is elevation of the right hemidiaphragm..  There is no pleural effusion.  The trachea is midline.  The lungs are symmetrically expanded bilaterally with patchy consolidative attenuation projected over the left mid and lower lung zones.  Additional consolidative opacity may be present projected over the right lower lung zone however is obscured by diaphragmatic elevation..  There is no pneumothorax.  The osseous  structures are remarkable for degenerative changes..                              X-Rays:   Independently Interpreted Readings:   Chest X-Ray: Multi-lower infiltrates      Medical Decision Making:   History:   Old Medical Records: I decided to obtain old medical records.  Initial Assessment:   54 y.o male presents with fever, cough, shortness of breath and COVID-19 exposure. Will do workup for pneumonia/flu/COVID-19.   Independently Interpreted Test(s):   I have ordered and independently interpreted X-rays - see prior notes.  I have ordered and independently interpreted EKG Reading(s) - see prior notes  Clinical Tests:   Lab Tests: Ordered and Reviewed  Radiological Study: Ordered and Reviewed  Medical Tests: Ordered and Reviewed  ED Management:  Reviewed labs, chest XR with bilateral infection. I am concerned he is not tolerating COVID-19 with pneumonia well, also history of bacterial pneumonia. Discussed with medicine, will admit for IV antibiotics, oxygen therapy and therapy such as steroids.             Scribe Attestation:   Scribe #1: I performed the above scribed service and the documentation accurately describes the services I performed. I attest to the accuracy of the note.    Attending Attestation:           Physician Attestation for Scribe:  Physician Attestation Statement for Scribe #1: I, Pearl, reviewed documentation, as scribed by Maryana in my presence, and it is both accurate and complete.                           Clinical Impression:     ICD-10-CM ICD-9-CM   1. Pneumonia of both lower lobes due to infectious organism  J18.9 486   2. Suspected COVID-19 virus infection  Z20.828 V01.79   3. Weakness  R53.1 780.79   4. Chest pain  R07.9 786.50                      Disposition:   Disposition: Admitted     ED Disposition Condition    Admit                             Yonathan Kang MD  11/08/20 [id number]

## 2020-11-02 NOTE — ED TRIAGE NOTES
Pt complains of pain with inspiration, SOB and fever x 5 days. Pt reports wife is COVID positive.

## 2020-11-03 LAB
ALBUMIN SERPL BCP-MCNC: 3.3 G/DL (ref 3.5–5.2)
ALP SERPL-CCNC: 37 U/L (ref 55–135)
ALT SERPL W/O P-5'-P-CCNC: 29 U/L (ref 10–44)
ANION GAP SERPL CALC-SCNC: 13 MMOL/L (ref 8–16)
APTT BLDCRRT: 25.6 SEC (ref 21–32)
AST SERPL-CCNC: 32 U/L (ref 10–40)
BASOPHILS # BLD AUTO: 0.01 K/UL (ref 0–0.2)
BASOPHILS NFR BLD: 0.2 % (ref 0–1.9)
BILIRUB SERPL-MCNC: 0.6 MG/DL (ref 0.1–1)
BUN SERPL-MCNC: 16 MG/DL (ref 6–20)
CALCIUM SERPL-MCNC: 9.1 MG/DL (ref 8.7–10.5)
CHLORIDE SERPL-SCNC: 97 MMOL/L (ref 95–110)
CO2 SERPL-SCNC: 21 MMOL/L (ref 23–29)
CREAT SERPL-MCNC: 1.1 MG/DL (ref 0.5–1.4)
D DIMER PPP IA.FEU-MCNC: 0.57 MG/L FEU
DIFFERENTIAL METHOD: ABNORMAL
EOSINOPHIL # BLD AUTO: 0 K/UL (ref 0–0.5)
EOSINOPHIL NFR BLD: 0 % (ref 0–8)
ERYTHROCYTE [DISTWIDTH] IN BLOOD BY AUTOMATED COUNT: 12.3 % (ref 11.5–14.5)
EST. GFR  (AFRICAN AMERICAN): >60 ML/MIN/1.73 M^2
EST. GFR  (NON AFRICAN AMERICAN): >60 ML/MIN/1.73 M^2
GLUCOSE SERPL-MCNC: 161 MG/DL (ref 70–110)
HCT VFR BLD AUTO: 46.4 % (ref 40–54)
HGB BLD-MCNC: 15.8 G/DL (ref 14–18)
IMM GRANULOCYTES # BLD AUTO: 0.03 K/UL (ref 0–0.04)
IMM GRANULOCYTES NFR BLD AUTO: 0.5 % (ref 0–0.5)
INR PPP: 1 (ref 0.8–1.2)
LYMPHOCYTES # BLD AUTO: 0.5 K/UL (ref 1–4.8)
LYMPHOCYTES NFR BLD: 7.3 % (ref 18–48)
MAGNESIUM SERPL-MCNC: 2.1 MG/DL (ref 1.6–2.6)
MCH RBC QN AUTO: 30.7 PG (ref 27–31)
MCHC RBC AUTO-ENTMCNC: 34.1 G/DL (ref 32–36)
MCV RBC AUTO: 90 FL (ref 82–98)
MONOCYTES # BLD AUTO: 0.6 K/UL (ref 0.3–1)
MONOCYTES NFR BLD: 9 % (ref 4–15)
NEUTROPHILS # BLD AUTO: 5.4 K/UL (ref 1.8–7.7)
NEUTROPHILS NFR BLD: 83 % (ref 38–73)
NRBC BLD-RTO: 0 /100 WBC
PHOSPHATE SERPL-MCNC: 3.3 MG/DL (ref 2.7–4.5)
PLATELET # BLD AUTO: 164 K/UL (ref 150–350)
PMV BLD AUTO: 10.5 FL (ref 9.2–12.9)
POTASSIUM SERPL-SCNC: 4 MMOL/L (ref 3.5–5.1)
PROT SERPL-MCNC: 7.5 G/DL (ref 6–8.4)
PROTHROMBIN TIME: 10.8 SEC (ref 9–12.5)
RBC # BLD AUTO: 5.14 M/UL (ref 4.6–6.2)
SODIUM SERPL-SCNC: 131 MMOL/L (ref 136–145)
WBC # BLD AUTO: 6.46 K/UL (ref 3.9–12.7)

## 2020-11-03 PROCEDURE — G0378 HOSPITAL OBSERVATION PER HR: HCPCS

## 2020-11-03 PROCEDURE — 84100 ASSAY OF PHOSPHORUS: CPT

## 2020-11-03 PROCEDURE — 36415 COLL VENOUS BLD VENIPUNCTURE: CPT

## 2020-11-03 PROCEDURE — 80053 COMPREHEN METABOLIC PANEL: CPT

## 2020-11-03 PROCEDURE — 94761 N-INVAS EAR/PLS OXIMETRY MLT: CPT

## 2020-11-03 PROCEDURE — 85379 FIBRIN DEGRADATION QUANT: CPT

## 2020-11-03 PROCEDURE — 25000003 PHARM REV CODE 250: Performed by: INTERNAL MEDICINE

## 2020-11-03 PROCEDURE — 63600175 PHARM REV CODE 636 W HCPCS: Performed by: INTERNAL MEDICINE

## 2020-11-03 PROCEDURE — 94799 UNLISTED PULMONARY SVC/PX: CPT

## 2020-11-03 PROCEDURE — 94640 AIRWAY INHALATION TREATMENT: CPT

## 2020-11-03 PROCEDURE — 94660 CPAP INITIATION&MGMT: CPT

## 2020-11-03 PROCEDURE — 99900035 HC TECH TIME PER 15 MIN (STAT)

## 2020-11-03 PROCEDURE — 99233 PR SUBSEQUENT HOSPITAL CARE,LEVL III: ICD-10-PCS | Mod: ,,, | Performed by: INTERNAL MEDICINE

## 2020-11-03 PROCEDURE — 25000242 PHARM REV CODE 250 ALT 637 W/ HCPCS: Performed by: INTERNAL MEDICINE

## 2020-11-03 PROCEDURE — 85025 COMPLETE CBC W/AUTO DIFF WBC: CPT

## 2020-11-03 PROCEDURE — 96375 TX/PRO/DX INJ NEW DRUG ADDON: CPT

## 2020-11-03 PROCEDURE — 27000190 HC CPAP FULL FACE MASK W/VALVE

## 2020-11-03 PROCEDURE — 85730 THROMBOPLASTIN TIME PARTIAL: CPT

## 2020-11-03 PROCEDURE — 99233 SBSQ HOSP IP/OBS HIGH 50: CPT | Mod: ,,, | Performed by: INTERNAL MEDICINE

## 2020-11-03 PROCEDURE — 20600001 HC STEP DOWN PRIVATE ROOM

## 2020-11-03 PROCEDURE — 85610 PROTHROMBIN TIME: CPT

## 2020-11-03 PROCEDURE — 83735 ASSAY OF MAGNESIUM: CPT

## 2020-11-03 PROCEDURE — 96372 THER/PROPH/DIAG INJ SC/IM: CPT | Mod: 59

## 2020-11-03 RX ADMIN — NEBIVOLOL HYDROCHLORIDE 2.5 MG: 2.5 TABLET ORAL at 11:11

## 2020-11-03 RX ADMIN — IPRATROPIUM BROMIDE AND ALBUTEROL SULFATE 3 ML: .5; 2.5 SOLUTION RESPIRATORY (INHALATION) at 12:11

## 2020-11-03 RX ADMIN — GUAIFENESIN AND DEXTROMETHORPHAN 5 ML: 100; 10 SYRUP ORAL at 06:11

## 2020-11-03 RX ADMIN — ACETAMINOPHEN 650 MG: 325 TABLET ORAL at 05:11

## 2020-11-03 RX ADMIN — LEVOFLOXACIN 750 MG: 750 INJECTION, SOLUTION INTRAVENOUS at 09:11

## 2020-11-03 RX ADMIN — Medication 1000 UNITS: at 09:11

## 2020-11-03 RX ADMIN — DEXAMETHASONE 6 MG: 4 TABLET ORAL at 09:11

## 2020-11-03 RX ADMIN — GUAIFENESIN AND DEXTROMETHORPHAN 5 ML: 100; 10 SYRUP ORAL at 05:11

## 2020-11-03 RX ADMIN — Medication 500 MG: at 10:11

## 2020-11-03 RX ADMIN — Medication 500 MG: at 09:11

## 2020-11-03 RX ADMIN — REMDESIVIR 200 MG: 100 INJECTION, POWDER, LYOPHILIZED, FOR SOLUTION INTRAVENOUS at 05:11

## 2020-11-03 RX ADMIN — IPRATROPIUM BROMIDE AND ALBUTEROL SULFATE 3 ML: .5; 2.5 SOLUTION RESPIRATORY (INHALATION) at 07:11

## 2020-11-03 RX ADMIN — BENZONATATE 100 MG: 100 CAPSULE, LIQUID FILLED ORAL at 10:11

## 2020-11-03 RX ADMIN — BENZONATATE 100 MG: 100 CAPSULE, LIQUID FILLED ORAL at 05:11

## 2020-11-03 RX ADMIN — GUAIFENESIN AND DEXTROMETHORPHAN 5 ML: 100; 10 SYRUP ORAL at 11:11

## 2020-11-03 RX ADMIN — ACETAMINOPHEN 650 MG: 325 TABLET ORAL at 10:11

## 2020-11-03 RX ADMIN — ENOXAPARIN SODIUM 40 MG: 40 INJECTION SUBCUTANEOUS at 10:11

## 2020-11-03 RX ADMIN — THERA TABS 1 TABLET: TAB at 09:11

## 2020-11-03 RX ADMIN — IPRATROPIUM BROMIDE AND ALBUTEROL SULFATE 3 ML: .5; 2.5 SOLUTION RESPIRATORY (INHALATION) at 01:11

## 2020-11-03 RX ADMIN — AMLODIPINE BESYLATE 5 MG: 5 TABLET ORAL at 10:11

## 2020-11-03 RX ADMIN — MELATONIN TAB 3 MG 6 MG: 3 TAB at 10:11

## 2020-11-03 NOTE — PLAN OF CARE
Problem: Adult Inpatient Plan of Care  Goal: Plan of Care Review  Outcome: Ongoing, Progressing  Goal: Patient-Specific Goal (Individualization)  Outcome: Ongoing, Progressing  Goal: Absence of Hospital-Acquired Illness or Injury  Outcome: Ongoing, Progressing  Goal: Optimal Comfort and Wellbeing  Outcome: Ongoing, Progressing  Goal: Readiness for Transition of Care  Outcome: Ongoing, Progressing    No acute events overnight. Patient A&Ox4. Able to follow commands and ROMERO. Denies chest pain, shortness of breath or dizziness. Low grade fever of 100 F overnight. Tylenol given for chest discomfort from frequent, dry coughing spells. VSS on RA. Monitor shows patient in sinus rhythm. Free from falls. Call bell within reach. Rounding in place for comfort and safety. Will continue to monitor.

## 2020-11-03 NOTE — PLAN OF CARE
Currently not stable for discharge -  Presently on room air.  Verbally has agreed to start REMDESIVIR - will monitor labs for toxicity.  Anticipate home with no needs. Will continue to follow    Inga Mccracken RN  Case Management  Ext [phone]       11/03/20 1058   Post-Acute Status   Post-Acute Authorization Other   Post-Acute Placement Status Awaiting Internal Medical Clearance   Discharge Delays None known at this time   Discharge Plan   Discharge Plan A Home   Discharge Plan B Home with family

## 2020-11-03 NOTE — RESEARCH
Derek Weil is currently being screened for the COVID ATTACC Trial (2020.169, Dr Aristeo Barrios PI). This study randomizes patients between therapeutic dose heparin (enoxaparin preferred) and usual care (including prophylactic doses).  Dr. Sen, the attending, agrees that the patient is a good candidate for the study.     An update to eligibility and consent status will be made. Please contact [phone] Edwina Aguilar with questions.

## 2020-11-03 NOTE — PROGRESS NOTES
[facility] Hospital Medicine                                                                     Progress Note     Team: [facility] R Сергей Sen MD   Admit Date: 11/2/2020   Hospital Day: 1  LIZ: 11/5/2020   Code status: Full Code   Principal Problem: Pneumonia of both lower lobes due to infectious organism     SUMMARY:     Mr Derek Joseph Weil is a 54 y.o. male with hx of hypertension [place] [facility] [place] progressively worsening dry cough with associated fevers, chills, malaise, weakness, insomnia, dyspnea (dalia on exertion), chest congestion, decreased po intake, intermittent diarrhea since 10/28 (6 day hx). He stated that he was otherwise well before his symptoms. He denied any dizziness, syncope, trauma, pleurisy, N/V, bleeds. States these symptoms were similar to his piror episode of PNA 1 yr ago. His wife is diagnosed with COVID and has been symptomatic for the past week, now admitted to hospital. With concerns for COVID PNA, he presenting to the ED. Nonsmoker. No alcohol use or illicit drug use.     In the ED, patient was febrile  Tm 102.4, normotensive, saturating well on RA. Labs normal for normal wbc, COVID+, Na 132, .4, Ferritin 789, , . CXR with multifocal PNA.     Admitted to hospital medicine for COVID PNA. He was started on supportive tx with inhalers, steroids, abx and oxygen PRN. Gradually improving.       SUBJECTIVE:     Pt was seen and examined at bedside. Pt had no acute events overnight, and no new complaints this morning. Pt remained hemodynamically stable and afebrile overnight. Continues to have dry cough with scant sputum, weakness and chills, however improving gradually. Appetite improving. Pt has been tolerating PO intake well without any nausea, vomiting, diarrhea, constipation, blood in stools or trouble  urinating. Pt denies any chest pain, worsening SOB. Saturating well on RA.    Review of Systems:  Pain Scale: 0 /10   Constitutional: fevers, chills, malaise, weakness, sleep disturbances   Respiratory: dry cough, shortness of breath, READ, chest congestion  Cardiovascular: no chest pain or palpitations  Gastrointestinal: intermittent diarrhea (resolved), decreased po intake, no nausea or vomiting, no abdominal pain  Genitourinary: no hematuria or dysuria  Integument/Breast: no rash or pruritis  Hematologic/Lymphatic: no easy bruising or lymphadenopathy  Musculoskeletal: no arthralgias or myalgias  Neurological: no seizures or tremors  Behavioral/Psych: no depression or anxiety         OBJECTIVE:     Vitals:  Temp:  [98.8 °F (37.1 °C)-102.4 °F (39.1 °C)]   Pulse:  [63-96]   Resp:  [19-22]   BP: (132-167)/(71-85)   SpO2:  [94 %-99 %]      I & O (Last 24H):     Intake/Output Summary (Last 24 hours) at 11/3/2020 1032  Last data filed at 11/2/2020 2000  Gross per 24 hour   Intake 60 ml   Output --   Net 60 ml       General appearance: no distress, non acute  Mental status: Alert and oriented x 4  HEENT:  conjunctivae/corneas clear, PERRL  Neck: supple, thyroid not enlarged  Pulm:  Saturating well on RA, normal respiratory effort, coarse BS throughout   Card: RRR, S1, S2 normal, no murmur, click, rub or gallop  Abd: soft, NT, ND, BS present; no masses, no organomegaly  Ext: no c/c/e  Pulses: 2+, symmetric  Skin: color, texture, turgor normal. No rashes or lesions  Neuro: Cranial Nerves grossly intact, no focal numbness or weakness, normal strength and tone       All recent labs and imaging has been reviewed.     Recent Results (from the past 24 hour(s))   POCT COVID-19 Rapid Screening    Collection Time: 11/02/20 12:46 PM   Result Value Ref Range    POC Rapid COVID Positive (A) Negative     Acceptable Yes    Blood culture x two cultures. Draw prior to antibiotics.    Collection Time: 11/02/20  1:08 PM     Specimen: Peripheral, Hand, Right; Blood   Result Value Ref Range    Blood Culture, Routine No Growth to date    Blood culture x two cultures. Draw prior to antibiotics.    Collection Time: 11/02/20  1:09 PM    Specimen: Peripheral, Hand, Left; Blood   Result Value Ref Range    Blood Culture, Routine No Growth to date    CBC auto differential    Collection Time: 11/02/20  1:10 PM   Result Value Ref Range    WBC 7.56 3.90 - 12.70 K/uL    RBC 5.08 4.60 - 6.20 M/uL    Hemoglobin 15.7 14.0 - 18.0 g/dL    Hematocrit 45.2 40.0 - 54.0 %    MCV 89 82 - 98 fL    MCH 30.9 27.0 - 31.0 pg    MCHC 34.7 32.0 - 36.0 g/dL    RDW 12.5 11.5 - 14.5 %    Platelets 159 150 - 350 K/uL    MPV 10.1 9.2 - 12.9 fL    Immature Granulocytes 0.3 0.0 - 0.5 %    Gran # (ANC) 6.3 1.8 - 7.7 K/uL    Immature Grans (Abs) 0.02 0.00 - 0.04 K/uL    Lymph # 0.6 (L) 1.0 - 4.8 K/uL    Mono # 0.6 0.3 - 1.0 K/uL    Eos # 0.0 0.0 - 0.5 K/uL    Baso # 0.02 0.00 - 0.20 K/uL    nRBC 0 0 /100 WBC    Gran % 82.9 (H) 38.0 - 73.0 %    Lymph % 8.1 (L) 18.0 - 48.0 %    Mono % 8.3 4.0 - 15.0 %    Eosinophil % 0.1 0.0 - 8.0 %    Basophil % 0.3 0.0 - 1.9 %    Differential Method Automated    Comprehensive metabolic panel    Collection Time: 11/02/20  1:10 PM   Result Value Ref Range    Sodium 132 (L) 136 - 145 mmol/L    Potassium 3.6 3.5 - 5.1 mmol/L    Chloride 98 95 - 110 mmol/L    CO2 23 23 - 29 mmol/L    Glucose 108 70 - 110 mg/dL    BUN 14 6 - 20 mg/dL    Creatinine 1.2 0.5 - 1.4 mg/dL    Calcium 8.8 8.7 - 10.5 mg/dL    Total Protein 7.4 6.0 - 8.4 g/dL    Albumin 3.5 3.5 - 5.2 g/dL    Total Bilirubin 0.8 0.1 - 1.0 mg/dL    Alkaline Phosphatase 39 (L) 55 - 135 U/L    AST 32 10 - 40 U/L    ALT 28 10 - 44 U/L    Anion Gap 11 8 - 16 mmol/L    eGFR if African American >60.0 >60 mL/min/1.73 m^2    eGFR if non African American >60.0 >60 mL/min/1.73 m^2   C-Reactive Protein    Collection Time: 11/02/20  1:10 PM   Result Value Ref Range    .4 (H) 0.0 - 8.2 mg/L    Ferritin    Collection Time: 11/02/20  1:10 PM   Result Value Ref Range    Ferritin 789 (H) 20.0 - 300.0 ng/mL   Lactate Dehydrogenase    Collection Time: 11/02/20  1:10 PM   Result Value Ref Range     (H) 110 - 260 U/L   CK    Collection Time: 11/02/20  1:10 PM   Result Value Ref Range     (H) 20 - 200 U/L   Lactic Acid, Plasma    Collection Time: 11/02/20  1:10 PM   Result Value Ref Range    Lactate (Lactic Acid) 1.3 0.5 - 2.2 mmol/L   Troponin I    Collection Time: 11/02/20  1:10 PM   Result Value Ref Range    Troponin I 0.018 0.000 - 0.026 ng/mL   Brain natriuretic peptide    Collection Time: 11/02/20  1:10 PM   Result Value Ref Range    BNP 13 0 - 99 pg/mL   POCT Influenza A/B Molecular    Collection Time: 11/02/20  2:10 PM   Result Value Ref Range    POC Molecular Influenza A Ag Negative Negative, Not Reported    POC Molecular Influenza B Ag Negative Negative, Not Reported     Acceptable Yes    Comprehensive Metabolic Panel (CMP)    Collection Time: 11/03/20  5:55 AM   Result Value Ref Range    Sodium 131 (L) 136 - 145 mmol/L    Potassium 4.0 3.5 - 5.1 mmol/L    Chloride 97 95 - 110 mmol/L    CO2 21 (L) 23 - 29 mmol/L    Glucose 161 (H) 70 - 110 mg/dL    BUN 16 6 - 20 mg/dL    Creatinine 1.1 0.5 - 1.4 mg/dL    Calcium 9.1 8.7 - 10.5 mg/dL    Total Protein 7.5 6.0 - 8.4 g/dL    Albumin 3.3 (L) 3.5 - 5.2 g/dL    Total Bilirubin 0.6 0.1 - 1.0 mg/dL    Alkaline Phosphatase 37 (L) 55 - 135 U/L    AST 32 10 - 40 U/L    ALT 29 10 - 44 U/L    Anion Gap 13 8 - 16 mmol/L    eGFR if African American >60.0 >60 mL/min/1.73 m^2    eGFR if non African American >60.0 >60 mL/min/1.73 m^2   Magnesium    Collection Time: 11/03/20  5:55 AM   Result Value Ref Range    Magnesium 2.1 1.6 - 2.6 mg/dL   Phosphorus    Collection Time: 11/03/20  5:55 AM   Result Value Ref Range    Phosphorus 3.3 2.7 - 4.5 mg/dL   CBC with Automated Differential    Collection Time: 11/03/20  5:55 AM   Result Value Ref Range     WBC 6.46 3.90 - 12.70 K/uL    RBC 5.14 4.60 - 6.20 M/uL    Hemoglobin 15.8 14.0 - 18.0 g/dL    Hematocrit 46.4 40.0 - 54.0 %    MCV 90 82 - 98 fL    MCH 30.7 27.0 - 31.0 pg    MCHC 34.1 32.0 - 36.0 g/dL    RDW 12.3 11.5 - 14.5 %    Platelets 164 150 - 350 K/uL    MPV 10.5 9.2 - 12.9 fL    Immature Granulocytes 0.5 0.0 - 0.5 %    Gran # (ANC) 5.4 1.8 - 7.7 K/uL    Immature Grans (Abs) 0.03 0.00 - 0.04 K/uL    Lymph # 0.5 (L) 1.0 - 4.8 K/uL    Mono # 0.6 0.3 - 1.0 K/uL    Eos # 0.0 0.0 - 0.5 K/uL    Baso # 0.01 0.00 - 0.20 K/uL    nRBC 0 0 /100 WBC    Gran % 83.0 (H) 38.0 - 73.0 %    Lymph % 7.3 (L) 18.0 - 48.0 %    Mono % 9.0 4.0 - 15.0 %    Eosinophil % 0.0 0.0 - 8.0 %    Basophil % 0.2 0.0 - 1.9 %    Differential Method Automated        No results for input(s): POCTGLUCOSE in the last 168 hours.    Hemoglobin A1C   Date Value Ref Range Status   02/11/2016 5.5 4.5 - 6.2 % Final     Hemoglobin A1c   Date Value Ref Range Status   02/27/2020 5.6 <5.7 % of total Hgb Final     Comment:     For the purpose of screening for the presence of  diabetes:    <5.7%       Consistent with the absence of diabetes  5.7-6.4%    Consistent with increased risk for diabetes              (prediabetes)  > or =6.5%  Consistent with diabetes    This assay result is consistent with a decreased risk  of diabetes.    Currently, no consensus exists regarding use of  hemoglobin A1c for diagnosis of diabetes in children.    According to American Diabetes Association (ADA)  guidelines, hemoglobin A1c <7.0% represents optimal  control in non-pregnant diabetic patients. Different  metrics may apply to specific patient populations.   Standards of Medical Care in Diabetes(ADA).            Active Hospital Problems    Diagnosis  POA    *Pneumonia of both lower lobes due to infectious organism [J18.9]  Yes      Resolved Hospital Problems   No resolved problems to display.          ASSESSMENT AND PLAN:       COVID-19 Virus Infection:  Viral Pneumonia  due to COVID-19:  -Pt tested for COVID-19 and noted to be positive on 11/2  -Isolation: Airborne/Droplet. Surgical mask on patient. Notify Infection Control  -Management: per [facility] COVID Treatment Protocol (4/15/20)  -Monitoring: Telemetry & Continuous Pulse Oximetry  -Antibiotics: Started on Levaquin in the ED, continue for total 5 days   -Nutrition:               -Multivitamin PO daily - ordered               -Add Boost supplement  - ordered               -Vitamin D 1000IU daily if deficient  - ordered               -Ascorbic acid 500mg PO bid  - ordered   -Supportive Care:              -Acetaminophen 650mg PO Q6hr PRN fever/headache  - ordered               -Loperamide PRN viral diarrhea  - ordered               -Robitussin, tessalon perls for cough  - ordered               -IVF if indicated, restrictive strategy preferred, no maintenance IV if able   -Other Therapies:  -Atorvastatin 40mg po daily - holding with elevated CPK              -Due to hypoxia, pt started on dexamethasone 6mg PO daily up to 10 days or until pt is discharged from hospital (whichever is sooner)  - ordered               -Pt meets criteria for remdesivir / tocilizumab. Pt provided verbal consent to start this medication and it being ordered/dosed per ID.   - ordered and started 11/3     Acute Respiratory Distress  -Not hypoxic at this time on rest or ambulation    -RT consult via Respiratory Communication for COVID Protocols  -If wheezing, albuterol INH Q6h scheduled & PRN  -Incentive Spirometer Q4h  -Flutter Valve Q4h  -Continuous pulse oximetry; titrate oxygen to keep sats between 92-96%  -Wean off O2 as tolerated    -Supplemental O2 via LFNC, VentiMask, or HFNC (see Respiratory Support Oxygen Therapies)  -Proning Protocol if patient is a candidate with GCS >13 and able to self-prone (see  Proning Protocol)  -If deterioration, may warrant trial of NIPPV and transfer to [facility] pressure room or immediate ICU consult     Hypertension  -  resume home meds (amlodipine 5 mg and nebivolol 2.5 mg daily)     Goals of care, counseling/discussion  -Reviewed the typical clinical course of COVID19 with patient, including the potential for acute decompensation requiring intubation and mechanical ventilation  -Discussed again as part of routine daily evaluation, patient/POA maintains code status of FULL CODE        VTE High Risk Prophylaxis: enoxaparin 40mg sq QHS @ 2100 (bundled care) if GFR >30  Dispo: DC home once medically ready.      Discharge Planning   LIZ: 11/5/2020     Code Status: Full Code   Is the patient medically ready for discharge?:     Reason for patient still in hospital (select all that apply): Patient trending condition         Сергей Sen MD  Hospital Medicine Staff  Pager [phone]

## 2020-11-03 NOTE — PLAN OF CARE
[place] [id number] for Discharge Planning Assessment. Per Divine, he lives with her spouse Divine (also patient [place] [id number] at this time) in a single family home on a slab foundation with 1 step to porch and point of entry.  Patient was independent with ADLS and DID NOT use in DME or in-home assistive equipment.  He is not on dialysis or coumadin. He takes medications as prescribed / keeps refilled / has resources for all daily and prescriptive needs. Preferred pharmacy is [facility] - Agreeable to bedside delivery. Lobito will have help from his spouse and other immediate family upon discharge?All questions addressed. Unit and CM direct numbers provided. Will continue to follow for course of hospitalization.    11/2/2020 11:57 AM   Weakness [R53.1]  Chest pain [R07.9]  Pneumonia of both lower lobes due to infectious organism [J18.9]  Suspected COVID-19 virus infection [Z20.828]          PCP:  WANTS TO REESTABLISH WITH PROVIDER AT [facility] OFFICE      Pharmacy:  [id number] - [place] [address] AT [address] [address]  Phone: [phone] Fax: [phone]    Emergency Contacts:  Extended Emergency Contact Information  Primary Emergency Contact: Weil,Debra  [address] [place] [address] [facility]  Home Phone: [phone]  Mobile Phone: [phone]  Relation: Spouse    Insurance: Payor: BLUE CROSS BLUE SHIELD / Plan: BCBS ALL OUT OF STATE / Product Type: PPO /       Inga Mccracken RN  Case Management  Ext: [phone]       11/03/20 1104   Discharge Assessment   Assessment Type Discharge Planning Assessment   Confirmed/corrected address and phone number on facesheet? Yes   Assessment information obtained from? Patient   Expected Length of Stay (days) 3   Communicated expected length of stay with patient/caregiver yes   Prior to hospitilization cognitive status: Alert/Oriented;No Deficits    Prior to hospitalization functional status: Independent   Current cognitive status: Alert/Oriented;No Deficits   Current Functional Status: Independent   [facility]   Lives With spouse   Able to Return to Prior Arrangements yes   Is patient able to care for self after discharge? Yes   Who are your caregiver(s) and their phone number(s)? Debra Weil -wife  [phone]   Patient's perception of discharge disposition home or selfcare   Readmission Within the Last 30 Days no previous admission in last 30 days   Patient currently being followed by outpatient case management? No   Patient currently receives any other outside agency services? No   Equipment Currently Used at Home none   Do you have any problems affording any of your prescribed medications? No   Is the patient taking medications as prescribed? yes   Does the patient have transportation home? Yes   Transportation Anticipated family or friend will provide   Dialysis Name and Scheduled days N/A   Discharge Plan A Home   Discharge Plan B Home with family   Patient/Family in Agreement with Plan yes

## 2020-11-04 LAB
ALBUMIN SERPL BCP-MCNC: 3.2 G/DL (ref 3.5–5.2)
ALP SERPL-CCNC: 51 U/L (ref 55–135)
ALT SERPL W/O P-5'-P-CCNC: 26 U/L (ref 10–44)
ANION GAP SERPL CALC-SCNC: 13 MMOL/L (ref 8–16)
AST SERPL-CCNC: 27 U/L (ref 10–40)
BASOPHILS # BLD AUTO: 0.01 K/UL (ref 0–0.2)
BASOPHILS NFR BLD: 0.1 % (ref 0–1.9)
BILIRUB SERPL-MCNC: 0.5 MG/DL (ref 0.1–1)
BUN SERPL-MCNC: 19 MG/DL (ref 6–20)
CALCIUM SERPL-MCNC: 9.6 MG/DL (ref 8.7–10.5)
CHLORIDE SERPL-SCNC: 98 MMOL/L (ref 95–110)
CO2 SERPL-SCNC: 25 MMOL/L (ref 23–29)
CREAT SERPL-MCNC: 1.2 MG/DL (ref 0.5–1.4)
CRP SERPL-MCNC: 72.8 MG/L (ref 0–8.2)
D DIMER PPP IA.FEU-MCNC: 1.28 MG/L FEU
DIFFERENTIAL METHOD: ABNORMAL
EOSINOPHIL # BLD AUTO: 0 K/UL (ref 0–0.5)
EOSINOPHIL NFR BLD: 0 % (ref 0–8)
ERYTHROCYTE [DISTWIDTH] IN BLOOD BY AUTOMATED COUNT: 12.9 % (ref 11.5–14.5)
EST. GFR  (AFRICAN AMERICAN): >60 ML/MIN/1.73 M^2
EST. GFR  (NON AFRICAN AMERICAN): >60 ML/MIN/1.73 M^2
FERRITIN SERPL-MCNC: 656 NG/ML (ref 20–300)
GLUCOSE SERPL-MCNC: 140 MG/DL (ref 70–110)
HCT VFR BLD AUTO: 49.5 % (ref 40–54)
HGB BLD-MCNC: 16.1 G/DL (ref 14–18)
IMM GRANULOCYTES # BLD AUTO: 0.06 K/UL (ref 0–0.04)
IMM GRANULOCYTES NFR BLD AUTO: 0.4 % (ref 0–0.5)
LDH SERPL L TO P-CCNC: 279 U/L (ref 110–260)
LYMPHOCYTES # BLD AUTO: 0.6 K/UL (ref 1–4.8)
LYMPHOCYTES NFR BLD: 4.5 % (ref 18–48)
MAGNESIUM SERPL-MCNC: 2.1 MG/DL (ref 1.6–2.6)
MCH RBC QN AUTO: 31.1 PG (ref 27–31)
MCHC RBC AUTO-ENTMCNC: 32.5 G/DL (ref 32–36)
MCV RBC AUTO: 96 FL (ref 82–98)
MONOCYTES # BLD AUTO: 1.2 K/UL (ref 0.3–1)
MONOCYTES NFR BLD: 8.3 % (ref 4–15)
NEUTROPHILS # BLD AUTO: 12.1 K/UL (ref 1.8–7.7)
NEUTROPHILS NFR BLD: 86.7 % (ref 38–73)
NRBC BLD-RTO: 0 /100 WBC
PHOSPHATE SERPL-MCNC: 4.5 MG/DL (ref 2.7–4.5)
PLATELET # BLD AUTO: 184 K/UL (ref 150–350)
PMV BLD AUTO: 11.2 FL (ref 9.2–12.9)
POTASSIUM SERPL-SCNC: 4.3 MMOL/L (ref 3.5–5.1)
PROT SERPL-MCNC: 7.2 G/DL (ref 6–8.4)
RBC # BLD AUTO: 5.17 M/UL (ref 4.6–6.2)
SODIUM SERPL-SCNC: 136 MMOL/L (ref 136–145)
WBC # BLD AUTO: 13.92 K/UL (ref 3.9–12.7)

## 2020-11-04 PROCEDURE — G0378 HOSPITAL OBSERVATION PER HR: HCPCS

## 2020-11-04 PROCEDURE — 83615 LACTATE (LD) (LDH) ENZYME: CPT

## 2020-11-04 PROCEDURE — 99226 PR SUBSEQUENT OBSERVATION CARE,LEVEL III: CPT | Mod: ,,, | Performed by: INTERNAL MEDICINE

## 2020-11-04 PROCEDURE — 83735 ASSAY OF MAGNESIUM: CPT

## 2020-11-04 PROCEDURE — 27000190 HC CPAP FULL FACE MASK W/VALVE

## 2020-11-04 PROCEDURE — 86140 C-REACTIVE PROTEIN: CPT

## 2020-11-04 PROCEDURE — 94799 UNLISTED PULMONARY SVC/PX: CPT

## 2020-11-04 PROCEDURE — 84100 ASSAY OF PHOSPHORUS: CPT

## 2020-11-04 PROCEDURE — 82728 ASSAY OF FERRITIN: CPT

## 2020-11-04 PROCEDURE — 80053 COMPREHEN METABOLIC PANEL: CPT

## 2020-11-04 PROCEDURE — 99900035 HC TECH TIME PER 15 MIN (STAT)

## 2020-11-04 PROCEDURE — 25000242 PHARM REV CODE 250 ALT 637 W/ HCPCS: Performed by: INTERNAL MEDICINE

## 2020-11-04 PROCEDURE — 96372 THER/PROPH/DIAG INJ SC/IM: CPT | Mod: 59

## 2020-11-04 PROCEDURE — 94660 CPAP INITIATION&MGMT: CPT

## 2020-11-04 PROCEDURE — 99226 PR SUBSEQUENT OBSERVATION CARE,LEVEL III: ICD-10-PCS | Mod: ,,, | Performed by: INTERNAL MEDICINE

## 2020-11-04 PROCEDURE — 20600001 HC STEP DOWN PRIVATE ROOM

## 2020-11-04 PROCEDURE — 25000003 PHARM REV CODE 250: Performed by: INTERNAL MEDICINE

## 2020-11-04 PROCEDURE — 36415 COLL VENOUS BLD VENIPUNCTURE: CPT

## 2020-11-04 PROCEDURE — 96376 TX/PRO/DX INJ SAME DRUG ADON: CPT

## 2020-11-04 PROCEDURE — 85025 COMPLETE CBC W/AUTO DIFF WBC: CPT

## 2020-11-04 PROCEDURE — 85379 FIBRIN DEGRADATION QUANT: CPT

## 2020-11-04 PROCEDURE — 27000221 HC OXYGEN, UP TO 24 HOURS

## 2020-11-04 PROCEDURE — 63600175 PHARM REV CODE 636 W HCPCS: Performed by: INTERNAL MEDICINE

## 2020-11-04 PROCEDURE — 25000003 PHARM REV CODE 250: Performed by: HOSPITALIST

## 2020-11-04 PROCEDURE — 94640 AIRWAY INHALATION TREATMENT: CPT

## 2020-11-04 PROCEDURE — 94761 N-INVAS EAR/PLS OXIMETRY MLT: CPT

## 2020-11-04 RX ORDER — ALUMINUM HYDROXIDE, MAGNESIUM HYDROXIDE, AND SIMETHICONE 2400; 240; 2400 MG/30ML; MG/30ML; MG/30ML
30 SUSPENSION ORAL EVERY 4 HOURS PRN
Status: DISCONTINUED | OUTPATIENT
Start: 2020-11-04 | End: 2020-11-11 | Stop reason: HOSPADM

## 2020-11-04 RX ORDER — ALUMINUM HYDROXIDE, MAGNESIUM HYDROXIDE, AND SIMETHICONE 2400; 240; 2400 MG/30ML; MG/30ML; MG/30ML
30 SUSPENSION ORAL ONCE
Status: COMPLETED | OUTPATIENT
Start: 2020-11-04 | End: 2020-11-04

## 2020-11-04 RX ORDER — PROMETHAZINE HYDROCHLORIDE AND CODEINE PHOSPHATE 6.25; 1 MG/5ML; MG/5ML
10 SOLUTION ORAL EVERY 4 HOURS PRN
Status: DISCONTINUED | OUTPATIENT
Start: 2020-11-04 | End: 2020-11-11 | Stop reason: HOSPADM

## 2020-11-04 RX ADMIN — IPRATROPIUM BROMIDE AND ALBUTEROL SULFATE 3 ML: .5; 2.5 SOLUTION RESPIRATORY (INHALATION) at 07:11

## 2020-11-04 RX ADMIN — PROMETHAZINE HYDROCHLORIDE AND CODEINE PHOSPHATE 10 ML: 10; 6.25 SOLUTION ORAL at 06:11

## 2020-11-04 RX ADMIN — AMLODIPINE BESYLATE 5 MG: 5 TABLET ORAL at 10:11

## 2020-11-04 RX ADMIN — BENZONATATE 100 MG: 100 CAPSULE, LIQUID FILLED ORAL at 10:11

## 2020-11-04 RX ADMIN — ACETAMINOPHEN 650 MG: 325 TABLET ORAL at 06:11

## 2020-11-04 RX ADMIN — MELATONIN TAB 3 MG 6 MG: 3 TAB at 10:11

## 2020-11-04 RX ADMIN — Medication 500 MG: at 08:11

## 2020-11-04 RX ADMIN — DEXAMETHASONE 6 MG: 4 TABLET ORAL at 10:11

## 2020-11-04 RX ADMIN — IPRATROPIUM BROMIDE AND ALBUTEROL SULFATE 3 ML: .5; 2.5 SOLUTION RESPIRATORY (INHALATION) at 01:11

## 2020-11-04 RX ADMIN — NEBIVOLOL HYDROCHLORIDE 2.5 MG: 2.5 TABLET ORAL at 08:11

## 2020-11-04 RX ADMIN — Medication 1000 UNITS: at 08:11

## 2020-11-04 RX ADMIN — LEVOFLOXACIN 750 MG: 750 INJECTION, SOLUTION INTRAVENOUS at 08:11

## 2020-11-04 RX ADMIN — PROMETHAZINE HYDROCHLORIDE AND CODEINE PHOSPHATE 10 ML: 10; 6.25 SOLUTION ORAL at 10:11

## 2020-11-04 RX ADMIN — ENOXAPARIN SODIUM 40 MG: 40 INJECTION SUBCUTANEOUS at 10:11

## 2020-11-04 RX ADMIN — REMDESIVIR 100 MG: 100 INJECTION, POWDER, LYOPHILIZED, FOR SOLUTION INTRAVENOUS at 03:11

## 2020-11-04 RX ADMIN — ALUMINUM HYDROXIDE, MAGNESIUM HYDROXIDE, AND DIMETHICONE 30 ML: 400; 400; 40 SUSPENSION ORAL at 08:11

## 2020-11-04 RX ADMIN — Medication 500 MG: at 10:11

## 2020-11-04 RX ADMIN — THERA TABS 1 TABLET: TAB at 08:11

## 2020-11-04 RX ADMIN — BENZONATATE 100 MG: 100 CAPSULE, LIQUID FILLED ORAL at 06:11

## 2020-11-04 NOTE — CONSULTS
[facility] Medicine  Telemedicine Consult Note    Patient Name: Derek Joseph Weil  MRN: [id number]  Admission Date: 11/2/2020  Hospital Length of Stay: 1 days  Attending Physician: Сергей Sen MD   Primary Care Provider: RL ANGEL, EdD          Mr. Derek Weil has been accepted for transfer to [facility] and will be followed through telemedicine services beginning 11/05/20 at 7 AM.         Babs Young MD  Department of Hospital Medicine   [facility]

## 2020-11-04 NOTE — NURSING
0700: report from isa MONROE. Pt reported not sleeping overnight. On room air. Alert and oriented. Continue to monitor.   0900: MD at bedside. Walk test scheduled for tomorrow. No longer probable d/c today r/t pt's fatigue.   1300: up to chair  1900: report given to oncoming nurse. Pt on 1L NC. Possible d/c tomorrow.

## 2020-11-04 NOTE — PROGRESS NOTES
[facility] Hospital Medicine                                                                     Progress Note     Team: [facility] R Сергей Sen MD   Admit Date: 11/2/2020   Hospital Day: 1  LIZ: 11/5/2020   Code status: Full Code   Principal Problem: Pneumonia of both lower lobes due to infectious organism     SUMMARY:     Mr Derek Joseph Weil is a 54 y.o. male with hx of hypertension presenting with [facility] with progressively worsening dry cough with associated fevers, chills, malaise, weakness, insomnia, dyspnea (dalia on exertion), chest congestion, decreased po intake, intermittent diarrhea since 10/28 (6 day hx). He stated that he was otherwise well before his symptoms. He denied any dizziness, syncope, trauma, pleurisy, N/V, bleeds. States these symptoms were similar to his piror episode of PNA 1 yr ago. His wife is diagnosed with COVID and has been symptomatic for the past week, now admitted to hospital. With concerns for COVID PNA, he presenting to the ED. Nonsmoker. No alcohol use or illicit drug use.     In the ED, patient was febrile  Tm 102.4, normotensive, saturating well on RA. Labs normal for normal wbc, COVID+, Na 132, .4, Ferritin 789, , . CXR with multifocal PNA.     Admitted to hospital medicine for COVID PNA. He was started on supportive tx with inhalers, steroids, abx and oxygen PRN. Gradually improving however continues to have chills, weakness and dyspnea on exertion. Continuing to monitoring him at this time.      SUBJECTIVE:     Pt was seen and examined at bedside. Afebrile. HDS and saturating well on RA. He told me he had a horrible night, sever night sweats, chills and insomnia. RN greatly helped him overnight supporting him. This morning patient is weak and tired, otherwise stable. Continues to have dry cough with  scant sputum, weakness and chills, however improving very gradually. Appetite improving. Pt has been tolerating PO intake well without any nausea, vomiting, diarrhea, constipation, blood in stools or trouble urinating. Pt denies any chest pain, worsening SOB. Saturating well on RA.    Review of Systems:  Pain Scale: 0 /10   Constitutional: fevers, chills, malaise, weakness, sleep disturbances   Respiratory: dry cough, shortness of breath, READ, chest congestion  Cardiovascular: no chest pain or palpitations  Gastrointestinal: intermittent diarrhea (resolved), decreased po intake, no nausea or vomiting, no abdominal pain  Genitourinary: no hematuria or dysuria  Integument/Breast: no rash or pruritis  Hematologic/Lymphatic: no easy bruising or lymphadenopathy  Musculoskeletal: no arthralgias or myalgias  Neurological: no seizures or tremors  Behavioral/Psych: no depression or anxiety         OBJECTIVE:     Vitals:  Temp:  [98 °F (36.7 °C)-98.8 °F (37.1 °C)]   Pulse:  [68-80]   Resp:  [18-24]   BP: (136-163)/(68-84)   SpO2:  [92 %-97 %]      I & O (Last 24H):     Intake/Output Summary (Last 24 hours) at 11/4/2020 1117  Last data filed at 11/4/2020 0900  Gross per 24 hour   Intake 480 ml   Output 300 ml   Net 180 ml       General appearance: no distress, non acute  Mental status: Alert and oriented x 4  HEENT:  conjunctivae/corneas clear, PERRL  Neck: supple, thyroid not enlarged  Pulm:  Saturating well on RA, normal respiratory effort, coarse BS throughout   Card: RRR, S1, S2 normal, no murmur, click, rub or gallop  Abd: soft, NT, ND, BS present; no masses, no organomegaly  Ext: no c/c/e  Pulses: 2+, symmetric  Skin: color, texture, turgor normal. No rashes or lesions  Neuro: Cranial Nerves grossly intact, no focal numbness or weakness, normal strength and tone       All recent labs and imaging has been reviewed.     Recent Results (from the past 24 hour(s))   Protime-INR    Collection Time: 11/03/20  2:23 PM   Result  Value Ref Range    Prothrombin Time 10.8 9.0 - 12.5 sec    INR 1.0 0.8 - 1.2   APTT    Collection Time: 11/03/20  2:23 PM   Result Value Ref Range    aPTT 25.6 21.0 - 32.0 sec   D dimer, quantitative    Collection Time: 11/03/20  2:23 PM   Result Value Ref Range    D-Dimer 0.57 (H) <0.50 mg/L FEU   Comprehensive Metabolic Panel (CMP)    Collection Time: 11/04/20  3:03 AM   Result Value Ref Range    Sodium 136 136 - 145 mmol/L    Potassium 4.3 3.5 - 5.1 mmol/L    Chloride 98 95 - 110 mmol/L    CO2 25 23 - 29 mmol/L    Glucose 140 (H) 70 - 110 mg/dL    BUN 19 6 - 20 mg/dL    Creatinine 1.2 0.5 - 1.4 mg/dL    Calcium 9.6 8.7 - 10.5 mg/dL    Total Protein 7.2 6.0 - 8.4 g/dL    Albumin 3.2 (L) 3.5 - 5.2 g/dL    Total Bilirubin 0.5 0.1 - 1.0 mg/dL    Alkaline Phosphatase 51 (L) 55 - 135 U/L    AST 27 10 - 40 U/L    ALT 26 10 - 44 U/L    Anion Gap 13 8 - 16 mmol/L    eGFR if African American >60.0 >60 mL/min/1.73 m^2    eGFR if non African American >60.0 >60 mL/min/1.73 m^2   Magnesium    Collection Time: 11/04/20  3:03 AM   Result Value Ref Range    Magnesium 2.1 1.6 - 2.6 mg/dL   Phosphorus    Collection Time: 11/04/20  3:03 AM   Result Value Ref Range    Phosphorus 4.5 2.7 - 4.5 mg/dL   CBC with Automated Differential    Collection Time: 11/04/20  3:03 AM   Result Value Ref Range    WBC 13.92 (H) 3.90 - 12.70 K/uL    RBC 5.17 4.60 - 6.20 M/uL    Hemoglobin 16.1 14.0 - 18.0 g/dL    Hematocrit 49.5 40.0 - 54.0 %    MCV 96 82 - 98 fL    MCH 31.1 (H) 27.0 - 31.0 pg    MCHC 32.5 32.0 - 36.0 g/dL    RDW 12.9 11.5 - 14.5 %    Platelets 184 150 - 350 K/uL    MPV 11.2 9.2 - 12.9 fL    Immature Granulocytes 0.4 0.0 - 0.5 %    Gran # (ANC) 12.1 (H) 1.8 - 7.7 K/uL    Immature Grans (Abs) 0.06 (H) 0.00 - 0.04 K/uL    Lymph # 0.6 (L) 1.0 - 4.8 K/uL    Mono # 1.2 (H) 0.3 - 1.0 K/uL    Eos # 0.0 0.0 - 0.5 K/uL    Baso # 0.01 0.00 - 0.20 K/uL    nRBC 0 0 /100 WBC    Gran % 86.7 (H) 38.0 - 73.0 %    Lymph % 4.5 (L) 18.0 - 48.0 %    Mono  % 8.3 4.0 - 15.0 %    Eosinophil % 0.0 0.0 - 8.0 %    Basophil % 0.1 0.0 - 1.9 %    Differential Method Automated    Ferritin    Collection Time: 11/04/20  3:03 AM   Result Value Ref Range    Ferritin 656 (H) 20.0 - 300.0 ng/mL   Lactate Dehydrogenase    Collection Time: 11/04/20  3:03 AM   Result Value Ref Range     (H) 110 - 260 U/L   D dimer, quantitative    Collection Time: 11/04/20  3:03 AM   Result Value Ref Range    D-Dimer 1.28 (H) <0.50 mg/L FEU   C-Reactive Protein    Collection Time: 11/04/20  3:03 AM   Result Value Ref Range    CRP 72.8 (H) 0.0 - 8.2 mg/L       No results for input(s): POCTGLUCOSE in the last 168 hours.    Hemoglobin A1C   Date Value Ref Range Status   02/11/2016 5.5 4.5 - 6.2 % Final     Hemoglobin A1c   Date Value Ref Range Status   02/27/2020 5.6 <5.7 % of total Hgb Final     Comment:     For the purpose of screening for the presence of  diabetes:    <5.7%       Consistent with the absence of diabetes  5.7-6.4%    Consistent with increased risk for diabetes              (prediabetes)  > or =6.5%  Consistent with diabetes    This assay result is consistent with a decreased risk  of diabetes.    Currently, no consensus exists regarding use of  hemoglobin A1c for diagnosis of diabetes in children.    According to American Diabetes Association (ADA)  guidelines, hemoglobin A1c <7.0% represents optimal  control in non-pregnant diabetic patients. Different  metrics may apply to specific patient populations.   Standards of Medical Care in Diabetes(ADA).            Active Hospital Problems    Diagnosis  POA    *Pneumonia of both lower lobes due to infectious organism [J18.9]  Yes      Resolved Hospital Problems   No resolved problems to display.          ASSESSMENT AND PLAN:       COVID-19 Virus Infection:  Viral Pneumonia due to COVID-19:  -Pt tested for COVID-19 and noted to be positive on 11/2  -Isolation: Airborne/Droplet. Surgical mask on patient. Notify Infection  Control  -Management: per [facility] COVID Treatment Protocol (4/15/20)  -Monitoring: Telemetry & Continuous Pulse Oximetry  -Antibiotics: Started on Levaquin in the ED, continue for total 5 days   -Nutrition:               -Multivitamin PO daily - ordered               -Add Boost supplement  - ordered               -Vitamin D 1000IU daily if deficient  - ordered               -Ascorbic acid 500mg PO bid  - ordered   -Supportive Care:              -Acetaminophen 650mg PO Q6hr PRN fever/headache  - ordered               -Loperamide PRN viral diarrhea  - ordered               -Robitussin, tessalon perls for cough  - ordered               -IVF if indicated, restrictive strategy preferred, no maintenance IV if able   -Other Therapies:  -Atorvastatin 40mg po daily - holding with elevated CPK              -Due to hypoxia, pt started on dexamethasone 6mg PO daily up to 10 days or until pt is discharged from hospital (whichever is sooner)  - ordered               -Pt meets criteria for remdesivir / tocilizumab. Pt provided verbal consent to start this medication and it being ordered/dosed per ID.   - ordered and started 11/3     Acute Respiratory Distress  -Not hypoxic at this time on rest or ambulation    -RT consult via Respiratory Communication for COVID Protocols  -If wheezing, albuterol INH Q6h scheduled & PRN  -Incentive Spirometer Q4h  -Flutter Valve Q4h  -Continuous pulse oximetry; titrate oxygen to keep sats between 92-96%  -Wean off O2 as tolerated    -Supplemental O2 via LFNC, VentiMask, or HFNC (see Respiratory Support Oxygen Therapies)  -Proning Protocol if patient is a candidate with GCS >13 and able to self-prone (see  Proning Protocol)  -If deterioration, may warrant trial of NIPPV and transfer to neg pressure room or immediate ICU consult     Hypertension  - resume home meds (amlodipine 5 mg and nebivolol 2.5 mg daily)     Goals of care, counseling/discussion  -Reviewed the typical clinical course of COVID19  with patient, including the potential for acute decompensation requiring intubation and mechanical ventilation  -Discussed again as part of routine daily evaluation, patient/POA maintains code status of FULL CODE        VTE High Risk Prophylaxis: enoxaparin 40mg sq QHS @ 2100 (bundled care) if GFR >30  Dispo: DC home once medically ready.      Discharge Planning   LIZ: 11/5/2020     Code Status: Full Code   Is the patient medically ready for discharge?:     Reason for patient still in hospital (select all that apply): Patient trending condition  Discharge Plan A: Home   Discharge Delays: None known at this time  Сергей Sen MD  [facility] Medicine Staff  Pager [phone]

## 2020-11-04 NOTE — PLAN OF CARE
"Problem: Adult Inpatient Plan of Care  Goal: Plan of Care Review  Outcome: Ongoing, Progressing  Goal: Patient-Specific Goal (Individualization)  Outcome: Ongoing, Progressing  Goal: Absence of Hospital-Acquired Illness or Injury  Outcome: Ongoing, Progressing  Goal: Optimal Comfort and Wellbeing  Outcome: Ongoing, Progressing  Goal: Readiness for Transition of Care  Outcome: Ongoing, Progressing     Patient A&Ox4. Able to follow commands and ROMERO. Denies chest pain, shortness of breath or dizziness. Patient eported abdominal pain and HA overnight. Patient also experienced episodes of coughing spells - Med Team R paged. Orders for Promethazine-Codeine and GI cocktail placed. GI cocktail not given on this shift due to lack of availability - message to Pharmacy sent and Receiving RN aware. VSS on RA. Monitor shows patient in sinus rhythm. Free from falls. Call bell within reach. Rounding in place for comfort and safety. Will continue to monitor.    11/4/2020    0515 Patient reporting excruciating abdominal pain. States, "It feels like my stomach is cramping and I feel bloated. It really hurts. I don't know what it could be." Patient explained of possible need to pass bowel movement. Patient denies urge and denies heart burn.    0521 Dr. Rushing paged and notified of patient's abdominal pain and coughing spells. New orders for GI cocktail and Promethazine-Codeine to be placed. Will continue to monitor.  "

## 2020-11-05 LAB
ALBUMIN SERPL BCP-MCNC: 2.9 G/DL (ref 3.5–5.2)
ALP SERPL-CCNC: 41 U/L (ref 55–135)
ALT SERPL W/O P-5'-P-CCNC: 24 U/L (ref 10–44)
ANION GAP SERPL CALC-SCNC: 9 MMOL/L (ref 8–16)
AST SERPL-CCNC: 22 U/L (ref 10–40)
BASOPHILS # BLD AUTO: 0.01 K/UL (ref 0–0.2)
BASOPHILS NFR BLD: 0.1 % (ref 0–1.9)
BILIRUB SERPL-MCNC: 0.5 MG/DL (ref 0.1–1)
BUN SERPL-MCNC: 22 MG/DL (ref 6–20)
CALCIUM SERPL-MCNC: 9 MG/DL (ref 8.7–10.5)
CHLORIDE SERPL-SCNC: 99 MMOL/L (ref 95–110)
CO2 SERPL-SCNC: 29 MMOL/L (ref 23–29)
CREAT SERPL-MCNC: 1.1 MG/DL (ref 0.5–1.4)
DIFFERENTIAL METHOD: ABNORMAL
EOSINOPHIL # BLD AUTO: 0 K/UL (ref 0–0.5)
EOSINOPHIL NFR BLD: 0 % (ref 0–8)
ERYTHROCYTE [DISTWIDTH] IN BLOOD BY AUTOMATED COUNT: 12.8 % (ref 11.5–14.5)
EST. GFR  (AFRICAN AMERICAN): >60 ML/MIN/1.73 M^2
EST. GFR  (NON AFRICAN AMERICAN): >60 ML/MIN/1.73 M^2
GLUCOSE SERPL-MCNC: 143 MG/DL (ref 70–110)
HCT VFR BLD AUTO: 44.4 % (ref 40–54)
HGB BLD-MCNC: 14.9 G/DL (ref 14–18)
IMM GRANULOCYTES # BLD AUTO: 0.05 K/UL (ref 0–0.04)
IMM GRANULOCYTES NFR BLD AUTO: 0.5 % (ref 0–0.5)
LYMPHOCYTES # BLD AUTO: 0.7 K/UL (ref 1–4.8)
LYMPHOCYTES NFR BLD: 7 % (ref 18–48)
MAGNESIUM SERPL-MCNC: 2.4 MG/DL (ref 1.6–2.6)
MCH RBC QN AUTO: 31.2 PG (ref 27–31)
MCHC RBC AUTO-ENTMCNC: 33.6 G/DL (ref 32–36)
MCV RBC AUTO: 93 FL (ref 82–98)
MONOCYTES # BLD AUTO: 1 K/UL (ref 0.3–1)
MONOCYTES NFR BLD: 9.8 % (ref 4–15)
NEUTROPHILS # BLD AUTO: 8.3 K/UL (ref 1.8–7.7)
NEUTROPHILS NFR BLD: 82.6 % (ref 38–73)
NRBC BLD-RTO: 0 /100 WBC
PHOSPHATE SERPL-MCNC: 4.5 MG/DL (ref 2.7–4.5)
PLATELET # BLD AUTO: 212 K/UL (ref 150–350)
PMV BLD AUTO: 10.1 FL (ref 9.2–12.9)
POCT GLUCOSE: 179 MG/DL (ref 70–110)
POTASSIUM SERPL-SCNC: 4.5 MMOL/L (ref 3.5–5.1)
PROT SERPL-MCNC: 6.8 G/DL (ref 6–8.4)
RBC # BLD AUTO: 4.78 M/UL (ref 4.6–6.2)
SODIUM SERPL-SCNC: 137 MMOL/L (ref 136–145)
WBC # BLD AUTO: 10.07 K/UL (ref 3.9–12.7)

## 2020-11-05 PROCEDURE — 94761 N-INVAS EAR/PLS OXIMETRY MLT: CPT

## 2020-11-05 PROCEDURE — 80053 COMPREHEN METABOLIC PANEL: CPT

## 2020-11-05 PROCEDURE — 27000646 HC AEROBIKA DEVICE

## 2020-11-05 PROCEDURE — 63600175 PHARM REV CODE 636 W HCPCS: Performed by: INTERNAL MEDICINE

## 2020-11-05 PROCEDURE — 96376 TX/PRO/DX INJ SAME DRUG ADON: CPT

## 2020-11-05 PROCEDURE — 99900035 HC TECH TIME PER 15 MIN (STAT)

## 2020-11-05 PROCEDURE — 83735 ASSAY OF MAGNESIUM: CPT

## 2020-11-05 PROCEDURE — 94640 AIRWAY INHALATION TREATMENT: CPT

## 2020-11-05 PROCEDURE — 25000003 PHARM REV CODE 250: Performed by: INTERNAL MEDICINE

## 2020-11-05 PROCEDURE — 25000242 PHARM REV CODE 250 ALT 637 W/ HCPCS: Performed by: INTERNAL MEDICINE

## 2020-11-05 PROCEDURE — G0378 HOSPITAL OBSERVATION PER HR: HCPCS

## 2020-11-05 PROCEDURE — 20600001 HC STEP DOWN PRIVATE ROOM

## 2020-11-05 PROCEDURE — 99225 PR SUBSEQUENT OBSERVATION CARE,LEVEL II: CPT | Mod: ,,, | Performed by: INTERNAL MEDICINE

## 2020-11-05 PROCEDURE — 36415 COLL VENOUS BLD VENIPUNCTURE: CPT

## 2020-11-05 PROCEDURE — 84100 ASSAY OF PHOSPHORUS: CPT

## 2020-11-05 PROCEDURE — 94799 UNLISTED PULMONARY SVC/PX: CPT

## 2020-11-05 PROCEDURE — 99225 PR SUBSEQUENT OBSERVATION CARE,LEVEL II: ICD-10-PCS | Mod: ,,, | Performed by: INTERNAL MEDICINE

## 2020-11-05 PROCEDURE — 96372 THER/PROPH/DIAG INJ SC/IM: CPT | Mod: 59

## 2020-11-05 PROCEDURE — 94664 DEMO&/EVAL PT USE INHALER: CPT

## 2020-11-05 PROCEDURE — 27000221 HC OXYGEN, UP TO 24 HOURS

## 2020-11-05 PROCEDURE — 85025 COMPLETE CBC W/AUTO DIFF WBC: CPT

## 2020-11-05 RX ADMIN — Medication 1000 UNITS: at 08:11

## 2020-11-05 RX ADMIN — Medication 500 MG: at 09:11

## 2020-11-05 RX ADMIN — AMLODIPINE BESYLATE 5 MG: 5 TABLET ORAL at 09:11

## 2020-11-05 RX ADMIN — ENOXAPARIN SODIUM 40 MG: 40 INJECTION SUBCUTANEOUS at 09:11

## 2020-11-05 RX ADMIN — LEVOFLOXACIN 750 MG: 750 INJECTION, SOLUTION INTRAVENOUS at 08:11

## 2020-11-05 RX ADMIN — ACETAMINOPHEN 650 MG: 325 TABLET ORAL at 09:11

## 2020-11-05 RX ADMIN — MELATONIN TAB 3 MG 6 MG: 3 TAB at 09:11

## 2020-11-05 RX ADMIN — THERA TABS 1 TABLET: TAB at 08:11

## 2020-11-05 RX ADMIN — IPRATROPIUM BROMIDE AND ALBUTEROL SULFATE 3 ML: .5; 2.5 SOLUTION RESPIRATORY (INHALATION) at 12:11

## 2020-11-05 RX ADMIN — NEBIVOLOL HYDROCHLORIDE 2.5 MG: 2.5 TABLET ORAL at 08:11

## 2020-11-05 RX ADMIN — IPRATROPIUM BROMIDE AND ALBUTEROL SULFATE 3 ML: .5; 2.5 SOLUTION RESPIRATORY (INHALATION) at 08:11

## 2020-11-05 RX ADMIN — IPRATROPIUM BROMIDE AND ALBUTEROL SULFATE 3 ML: .5; 2.5 SOLUTION RESPIRATORY (INHALATION) at 07:11

## 2020-11-05 RX ADMIN — Medication 500 MG: at 08:11

## 2020-11-05 RX ADMIN — REMDESIVIR 100 MG: 100 INJECTION, POWDER, LYOPHILIZED, FOR SOLUTION INTRAVENOUS at 03:11

## 2020-11-05 RX ADMIN — DEXAMETHASONE 6 MG: 4 TABLET ORAL at 08:11

## 2020-11-05 NOTE — PLAN OF CARE
Problem: Adult Inpatient Plan of Care  Goal: Plan of Care Review  Outcome: Ongoing, Progressing  Goal: Patient-Specific Goal (Individualization)  Outcome: Ongoing, Progressing  Goal: Absence of Hospital-Acquired Illness or Injury  Outcome: Ongoing, Progressing  Goal: Optimal Comfort and Wellbeing  Outcome: Ongoing, Progressing  Goal: Readiness for Transition of Care  Outcome: Ongoing, Progressing  Goal: Rounds/Family Conference  Outcome: Ongoing, Progressing     Problem: Infection  Goal: Infection Symptom Resolution  Outcome: Ongoing, Progressing     Problem: Fluid Imbalance (Pneumonia)  Goal: Fluid Balance  Outcome: Ongoing, Progressing     Problem: Infection (Pneumonia)  Goal: Resolution of Infection Signs/Symptoms  Outcome: Ongoing, Progressing     Problem: Respiratory Compromise (Pneumonia)  Goal: Effective Oxygenation and Ventilation  Outcome: Ongoing, Progressing     Problem: Fall Injury Risk  Goal: Absence of Fall and Fall-Related Injury  Outcome: Ongoing, Progressing    No acute events overnight. Patient A&Ox4. Able to follow commands and ROMERO. Denies chest pain, shortness of breath or dizziness. Afebrile. VSS on RA. Monitor shows patient in sinus rhythm. Free from falls. Call bell within reach. Rounding in place for comfort and safety. Will continue to monitor.

## 2020-11-05 NOTE — PROGRESS NOTES
[facility] Hospital Medicine     Telemedicine Progress Note     Team: [facility] VIRTUAL TEAM 10 Loretta Torres MD   Admit Date: 11/2/2020   Hospital Day: 1  LZI: 11/6/2020   Code status: Full Code   Principal Problem: Pneumonia of both lower lobes due to infectious organism       This service was provided through telemedicine.  Patient was transferred to the telemedicine service on:  11/05/2020   The patient consents to virtual visits.   The [facility]  Chief complaint:  Pneumonia of both lower lobes due to infectious organism   Start time: 314p    End time:  323p    Total time spent with patient: 9 min  Present with the patient at the time of the telemed/virtual assessment: n/a  I have assessed these findings virtually using a telemed platform and with assistance of bedside nurse.  The attending portion of this evaluation, treatment, and documentation was performed per Loretta Torres MD via audiovisual modality.        SUMMARY:     Mr Derek Joseph Weil is a 54 y.o. male with hx of hypertension presenting with [facility] with progressively worsening dry cough with associated fevers, chills, malaise, weakness, insomnia, dyspnea (dalia on exertion), chest congestion, decreased po intake, intermittent diarrhea since 10/28 (6 day hx). He stated that he was otherwise well before his symptoms. He denied any dizziness, syncope, trauma, pleurisy, N/V, bleeds. States these symptoms were similar to his piror episode of PNA 1 yr ago. His wife is diagnosed with COVID and has been symptomatic for the past week, now admitted to hospital. With concerns for COVID PNA, he presenting to the ED. Nonsmoker. No alcohol use or illicit drug use.     In the ED, patient was febrile  Tm 102.4, normotensive, saturating well on RA. Labs normal for normal wbc, COVID+, Na 132, CRP  112.4, Ferritin 789, , . CXR with multifocal PNA.     Admitted to hospital medicine for COVID PNA. He was started on supportive tx with inhalers, steroids, abx and oxygen PRN. Gradually improving however continues to have chills, weakness and dyspnea on exertion. Continuing to monitoring him at this time.      SUBJECTIVE:     Patient afebrile, on supplemental oxygen at 1L/min.  He states he feels improved - dyspnea with exertion improving and dry cough is short-lived when occurs so improving as well.  He has been using CPAP nightly.  D#4/5 or remdesivir.  Denies diarrhea, taste/smell alterations.       Review of Systems:  Constitutional: fevers, chills,  +malaise  Respiratory: + dry cough, shortness of breath, READ, chest congestion  Cardiovascular: no chest pain or palpitations  Gastrointestinal: no diarrhea , appetite improved, no nausea or vomiting, no abdominal pain  Integument/Breast: no rash or pruritis  Musculoskeletal: no arthralgias or myalgias  Neurological: no seizures or tremors  Behavioral/Psych: no depression or anxiety         OBJECTIVE:     Vitals:  Temp:  [97.6 °F (36.4 °C)-98.2 °F (36.8 °C)]   Pulse:  [57-76]   Resp:  [18-21]   BP: (139-151)/(76-87)   SpO2:  [90 %-97 %]      I & O (Last 24H):     Intake/Output Summary (Last 24 hours) at 11/5/2020 1607  Last data filed at 11/5/2020 1517  Gross per 24 hour   Intake 1240 ml   Output --   Net 1240 ml       Physical Exam  Vitals signs and nursing note reviewed.   Constitutional:       General: He is not in acute distress.     Appearance: He is normal weight. He is not ill-appearing.   HENT:      Head: Normocephalic and atraumatic.      Right Ear: Hearing normal.      Left Ear: Hearing normal.      Nose: Nose normal.   Eyes:      General: No scleral icterus.        Right eye: No discharge.         Left eye: No discharge.      Extraocular Movements: Extraocular movements intact.   Cardiovascular:      Rate and Rhythm: Normal rate.   Pulmonary:       Effort: Pulmonary effort is normal. No accessory muscle usage or respiratory distress.   Skin:     Findings: No rash.   Neurological:      General: No focal deficit present.      Mental Status: He is alert and oriented to person, place, and time.      Cranial Nerves: No cranial nerve deficit.      Motor: No weakness.   Psychiatric:         Mood and Affect: Mood normal.         Behavior: Behavior normal.         Thought Content: Thought content normal.         Judgment: Judgment normal.           All recent labs and imaging has been reviewed.     Recent Results (from the past 24 hour(s))   Comprehensive Metabolic Panel (CMP)    Collection Time: 11/05/20  5:19 AM   Result Value Ref Range    Sodium 137 136 - 145 mmol/L    Potassium 4.5 3.5 - 5.1 mmol/L    Chloride 99 95 - 110 mmol/L    CO2 29 23 - 29 mmol/L    Glucose 143 (H) 70 - 110 mg/dL    BUN 22 (H) 6 - 20 mg/dL    Creatinine 1.1 0.5 - 1.4 mg/dL    Calcium 9.0 8.7 - 10.5 mg/dL    Total Protein 6.8 6.0 - 8.4 g/dL    Albumin 2.9 (L) 3.5 - 5.2 g/dL    Total Bilirubin 0.5 0.1 - 1.0 mg/dL    Alkaline Phosphatase 41 (L) 55 - 135 U/L    AST 22 10 - 40 U/L    ALT 24 10 - 44 U/L    Anion Gap 9 8 - 16 mmol/L    eGFR if African American >60.0 >60 mL/min/1.73 m^2    eGFR if non African American >60.0 >60 mL/min/1.73 m^2   Magnesium    Collection Time: 11/05/20  5:19 AM   Result Value Ref Range    Magnesium 2.4 1.6 - 2.6 mg/dL   Phosphorus    Collection Time: 11/05/20  5:19 AM   Result Value Ref Range    Phosphorus 4.5 2.7 - 4.5 mg/dL   CBC with Automated Differential    Collection Time: 11/05/20  5:19 AM   Result Value Ref Range    WBC 10.07 3.90 - 12.70 K/uL    RBC 4.78 4.60 - 6.20 M/uL    Hemoglobin 14.9 14.0 - 18.0 g/dL    Hematocrit 44.4 40.0 - 54.0 %    MCV 93 82 - 98 fL    MCH 31.2 (H) 27.0 - 31.0 pg    MCHC 33.6 32.0 - 36.0 g/dL    RDW 12.8 11.5 - 14.5 %    Platelets 212 150 - 350 K/uL    MPV 10.1 9.2 - 12.9 fL    Immature Granulocytes 0.5 0.0 - 0.5 %    Gran #  (ANC) 8.3 (H) 1.8 - 7.7 K/uL    Immature Grans (Abs) 0.05 (H) 0.00 - 0.04 K/uL    Lymph # 0.7 (L) 1.0 - 4.8 K/uL    Mono # 1.0 0.3 - 1.0 K/uL    Eos # 0.0 0.0 - 0.5 K/uL    Baso # 0.01 0.00 - 0.20 K/uL    nRBC 0 0 /100 WBC    Gran % 82.6 (H) 38.0 - 73.0 %    Lymph % 7.0 (L) 18.0 - 48.0 %    Mono % 9.8 4.0 - 15.0 %    Eosinophil % 0.0 0.0 - 8.0 %    Basophil % 0.1 0.0 - 1.9 %    Differential Method Automated        No results for input(s): POCTGLUCOSE in the last 168 hours.    Hemoglobin A1C   Date Value Ref Range Status   02/11/2016 5.5 4.5 - 6.2 % Final     Hemoglobin A1c   Date Value Ref Range Status   02/27/2020 5.6 <5.7 % of total Hgb Final     Comment:     For the purpose of screening for the presence of  diabetes:    <5.7%       Consistent with the absence of diabetes  5.7-6.4%    Consistent with increased risk for diabetes              (prediabetes)  > or =6.5%  Consistent with diabetes    This assay result is consistent with a decreased risk  of diabetes.    Currently, no consensus exists regarding use of  hemoglobin A1c for diagnosis of diabetes in children.    According to American Diabetes Association (ADA)  guidelines, hemoglobin A1c <7.0% represents optimal  control in non-pregnant diabetic patients. Different  metrics may apply to specific patient populations.   Standards of Medical Care in Diabetes(ADA).            Active Hospital Problems    Diagnosis  POA    *Pneumonia of both lower lobes due to infectious organism [J18.9]  Yes      Resolved Hospital Problems   No resolved problems to display.          ASSESSMENT AND PLAN:       COVID-19 Virus Infection:  Viral Pneumonia due to COVID-19:  -Pt tested for COVID-19 and noted to be positive on 11/2  -Isolation: Airborne/Droplet. Surgical mask on patient. Notify Infection Control  -Management: per [facility] COVID Treatment Protocol (4/15/20)  -Monitoring: Telemetry & Continuous Pulse Oximetry  -Antibiotics: Started on Levaquin in the ED, continue for  total 5 days   -Nutrition:               -Multivitamin PO daily - ordered               -Add Boost supplement  - ordered               -Vitamin D 1000IU daily if deficient  - ordered               -Ascorbic acid 500mg PO bid  - ordered   -Supportive Care:              -Acetaminophen 650mg PO Q6hr PRN fever/headache  - ordered               -Loperamide PRN viral diarrhea  - ordered               -Robitussin, tessalon perls for cough  - ordered               -IVF if indicated, restrictive strategy preferred, no maintenance IV if able   -Other Therapies:  -Atorvastatin 40mg po daily - holding with elevated CPK              -Due to hypoxia, pt started on dexamethasone 6mg PO daily up to 10 days or until pt is discharged from hospital (whichever is sooner)  - ordered               -Pt meets criteria for remdesivir / tocilizumab. Pt provided verbal consent to start this medication and it being ordered/dosed per ID.   - ordered and started 11/3     Hypoxia  -+ hypoxic at this time with ambulation on 6 min walk test and anticipate patient will need home oxygen on discharge  -RT consult via Respiratory Communication for COVID Protocols  -If wheezing, albuterol INH Q6h scheduled & PRN  -Incentive Spirometer Q4h  -Flutter Valve Q4h  -Continuous pulse oximetry; titrate oxygen to keep sats between 92-96%  -Wean off O2 as tolerated    -Supplemental O2 via LFNC, VentiMask, or HFNC (see Respiratory Support Oxygen Therapies)  -Proning Protocol if patient is a candidate with GCS >13 and able to self-prone (see  Proning Protocol)  -If deterioration, may warrant trial of NIPPV and transfer to neg pressure room or immediate ICU consult     Hypertension  - resume home meds (amlodipine 5 mg and nebivolol 2.5 mg daily)  - continue     MARISA on CPAP   - continue CPAP use nightly    Goals of care, counseling/discussion  -Reviewed the typical clinical course of COVID19 with patient, including the potential for acute decompensation requiring  intubation and mechanical ventilation  -Discussed again as part of routine daily evaluation, patient/POA maintains code status of FULL CODE        VTE High Risk Prophylaxis: enoxaparin 40mg sq QHS @ 2100 (bundled care) if GFR >30  Dispo: DC home once medically ready.      Discharge Planning   LIZ: 11/6/2020     Code Status: Full Code   Is the patient medically ready for discharge?:     Reason for patient still in hospital (select all that apply): Patient trending condition  Discharge Plan A: Home   Discharge Delays: None known at this time      Loretta Torres MD  Hospital Medicine [facility] [phone]

## 2020-11-05 NOTE — PROGRESS NOTES
Home Oxygen Evaluation    Date Performed: 2020    1) Patient's Home O2 Sat on room air, while at rest: 91%        If O2 sats on room air at rest are 88% or below, patient qualifies. No additional testing needed. Document N/A in steps 2 and 3. If 89% or above, complete steps 2.      2) Patient's O2 Sat on room air while exercisin%        If O2 sats on room air while exercising remain 89% or above patient does not qualify, no further testing needed Document N/A in step 3. If O2 sats on room air while exercising are 88% or below, continue to step 3.      3) Patient's O2 Sat while exercising on O2: 90 at 3 LPM         (Must show improvement from #2 for patients to qualify)    If O2 sats improve on oxygen, patient qualifies for portable oxygen. If not, the patient does not qualify.

## 2020-11-06 LAB
ALBUMIN SERPL BCP-MCNC: 2.7 G/DL (ref 3.5–5.2)
ALP SERPL-CCNC: 54 U/L (ref 55–135)
ALT SERPL W/O P-5'-P-CCNC: 23 U/L (ref 10–44)
ANION GAP SERPL CALC-SCNC: 11 MMOL/L (ref 8–16)
AST SERPL-CCNC: 24 U/L (ref 10–40)
BASOPHILS # BLD AUTO: 0.02 K/UL (ref 0–0.2)
BASOPHILS NFR BLD: 0.2 % (ref 0–1.9)
BILIRUB SERPL-MCNC: 0.3 MG/DL (ref 0.1–1)
BUN SERPL-MCNC: 20 MG/DL (ref 6–20)
CALCIUM SERPL-MCNC: 8.5 MG/DL (ref 8.7–10.5)
CHLORIDE SERPL-SCNC: 102 MMOL/L (ref 95–110)
CO2 SERPL-SCNC: 26 MMOL/L (ref 23–29)
CREAT SERPL-MCNC: 1 MG/DL (ref 0.5–1.4)
CRP SERPL-MCNC: 53.7 MG/L (ref 0–8.2)
D DIMER PPP IA.FEU-MCNC: 0.34 MG/L FEU
DIFFERENTIAL METHOD: ABNORMAL
EOSINOPHIL # BLD AUTO: 0 K/UL (ref 0–0.5)
EOSINOPHIL NFR BLD: 0.1 % (ref 0–8)
ERYTHROCYTE [DISTWIDTH] IN BLOOD BY AUTOMATED COUNT: 12.8 % (ref 11.5–14.5)
EST. GFR  (AFRICAN AMERICAN): >60 ML/MIN/1.73 M^2
EST. GFR  (NON AFRICAN AMERICAN): >60 ML/MIN/1.73 M^2
FERRITIN SERPL-MCNC: 637 NG/ML (ref 20–300)
GLUCOSE SERPL-MCNC: 175 MG/DL (ref 70–110)
HCT VFR BLD AUTO: 45.3 % (ref 40–54)
HGB BLD-MCNC: 14.9 G/DL (ref 14–18)
IMM GRANULOCYTES # BLD AUTO: 0.18 K/UL (ref 0–0.04)
IMM GRANULOCYTES NFR BLD AUTO: 1.6 % (ref 0–0.5)
LDH SERPL L TO P-CCNC: 409 U/L (ref 110–260)
LYMPHOCYTES # BLD AUTO: 0.8 K/UL (ref 1–4.8)
LYMPHOCYTES NFR BLD: 6.6 % (ref 18–48)
MAGNESIUM SERPL-MCNC: 2.4 MG/DL (ref 1.6–2.6)
MCH RBC QN AUTO: 31 PG (ref 27–31)
MCHC RBC AUTO-ENTMCNC: 32.9 G/DL (ref 32–36)
MCV RBC AUTO: 94 FL (ref 82–98)
MONOCYTES # BLD AUTO: 1.1 K/UL (ref 0.3–1)
MONOCYTES NFR BLD: 9.1 % (ref 4–15)
NEUTROPHILS # BLD AUTO: 9.5 K/UL (ref 1.8–7.7)
NEUTROPHILS NFR BLD: 82.4 % (ref 38–73)
NRBC BLD-RTO: 0 /100 WBC
PHOSPHATE SERPL-MCNC: 4.5 MG/DL (ref 2.7–4.5)
PLATELET # BLD AUTO: 247 K/UL (ref 150–350)
PMV BLD AUTO: 10.3 FL (ref 9.2–12.9)
POTASSIUM SERPL-SCNC: 4.6 MMOL/L (ref 3.5–5.1)
PROT SERPL-MCNC: 6.5 G/DL (ref 6–8.4)
RBC # BLD AUTO: 4.8 M/UL (ref 4.6–6.2)
SODIUM SERPL-SCNC: 139 MMOL/L (ref 136–145)
WBC # BLD AUTO: 11.51 K/UL (ref 3.9–12.7)

## 2020-11-06 PROCEDURE — 94664 DEMO&/EVAL PT USE INHALER: CPT

## 2020-11-06 PROCEDURE — 94640 AIRWAY INHALATION TREATMENT: CPT

## 2020-11-06 PROCEDURE — 63600175 PHARM REV CODE 636 W HCPCS: Performed by: INTERNAL MEDICINE

## 2020-11-06 PROCEDURE — 27000646 HC AEROBIKA DEVICE

## 2020-11-06 PROCEDURE — 94761 N-INVAS EAR/PLS OXIMETRY MLT: CPT

## 2020-11-06 PROCEDURE — 99900035 HC TECH TIME PER 15 MIN (STAT)

## 2020-11-06 PROCEDURE — 94799 UNLISTED PULMONARY SVC/PX: CPT

## 2020-11-06 PROCEDURE — 80053 COMPREHEN METABOLIC PANEL: CPT

## 2020-11-06 PROCEDURE — 25000003 PHARM REV CODE 250: Performed by: INTERNAL MEDICINE

## 2020-11-06 PROCEDURE — 84100 ASSAY OF PHOSPHORUS: CPT

## 2020-11-06 PROCEDURE — 27000221 HC OXYGEN, UP TO 24 HOURS

## 2020-11-06 PROCEDURE — 20600001 HC STEP DOWN PRIVATE ROOM

## 2020-11-06 PROCEDURE — 85379 FIBRIN DEGRADATION QUANT: CPT

## 2020-11-06 PROCEDURE — 83615 LACTATE (LD) (LDH) ENZYME: CPT

## 2020-11-06 PROCEDURE — 25000242 PHARM REV CODE 250 ALT 637 W/ HCPCS: Performed by: INTERNAL MEDICINE

## 2020-11-06 PROCEDURE — 82728 ASSAY OF FERRITIN: CPT

## 2020-11-06 PROCEDURE — 36415 COLL VENOUS BLD VENIPUNCTURE: CPT

## 2020-11-06 PROCEDURE — 86140 C-REACTIVE PROTEIN: CPT

## 2020-11-06 PROCEDURE — 85025 COMPLETE CBC W/AUTO DIFF WBC: CPT

## 2020-11-06 PROCEDURE — 99232 SBSQ HOSP IP/OBS MODERATE 35: CPT | Mod: ,,, | Performed by: INTERNAL MEDICINE

## 2020-11-06 PROCEDURE — 99232 PR SUBSEQUENT HOSPITAL CARE,LEVL II: ICD-10-PCS | Mod: ,,, | Performed by: INTERNAL MEDICINE

## 2020-11-06 PROCEDURE — 83735 ASSAY OF MAGNESIUM: CPT

## 2020-11-06 RX ORDER — AMOXICILLIN 250 MG
1 CAPSULE ORAL 2 TIMES DAILY PRN
Status: DISCONTINUED | OUTPATIENT
Start: 2020-11-06 | End: 2020-11-08

## 2020-11-06 RX ORDER — FUROSEMIDE 10 MG/ML
20 INJECTION INTRAMUSCULAR; INTRAVENOUS ONCE
Status: COMPLETED | OUTPATIENT
Start: 2020-11-06 | End: 2020-11-06

## 2020-11-06 RX ADMIN — IPRATROPIUM BROMIDE AND ALBUTEROL SULFATE 3 ML: .5; 2.5 SOLUTION RESPIRATORY (INHALATION) at 01:11

## 2020-11-06 RX ADMIN — FUROSEMIDE 20 MG: 10 INJECTION, SOLUTION INTRAMUSCULAR; INTRAVENOUS at 12:11

## 2020-11-06 RX ADMIN — Medication 500 MG: at 08:11

## 2020-11-06 RX ADMIN — IPRATROPIUM BROMIDE AND ALBUTEROL SULFATE 3 ML: .5; 2.5 SOLUTION RESPIRATORY (INHALATION) at 08:11

## 2020-11-06 RX ADMIN — Medication 1000 UNITS: at 09:11

## 2020-11-06 RX ADMIN — IPRATROPIUM BROMIDE AND ALBUTEROL SULFATE 3 ML: .5; 2.5 SOLUTION RESPIRATORY (INHALATION) at 12:11

## 2020-11-06 RX ADMIN — ENOXAPARIN SODIUM 40 MG: 40 INJECTION SUBCUTANEOUS at 08:11

## 2020-11-06 RX ADMIN — THERA TABS 1 TABLET: TAB at 09:11

## 2020-11-06 RX ADMIN — REMDESIVIR 100 MG: 100 INJECTION, POWDER, LYOPHILIZED, FOR SOLUTION INTRAVENOUS at 03:11

## 2020-11-06 RX ADMIN — DEXAMETHASONE 6 MG: 4 TABLET ORAL at 11:11

## 2020-11-06 RX ADMIN — NEBIVOLOL HYDROCHLORIDE 2.5 MG: 2.5 TABLET ORAL at 09:11

## 2020-11-06 RX ADMIN — Medication 500 MG: at 09:11

## 2020-11-06 RX ADMIN — LEVOFLOXACIN 750 MG: 750 INJECTION, SOLUTION INTRAVENOUS at 09:11

## 2020-11-06 RX ADMIN — DOCUSATE SODIUM 50MG AND SENNOSIDES 8.6MG 1 TABLET: 8.6; 5 TABLET, FILM COATED ORAL at 08:11

## 2020-11-06 RX ADMIN — AMLODIPINE BESYLATE 5 MG: 5 TABLET ORAL at 08:11

## 2020-11-06 NOTE — PLAN OF CARE
Currently not stable for discharge - weaning O2 - REMDESIVIR to complete on 11/07/2020.  Will have 6 minute walk test to determine home O2 needs. Will continue to follow    Inga Mccracken RN  Case Management  Ext [phone]       11/06/20 6021   Discharge Reassessment   Assessment Type Discharge Planning Reassessment   Provided patient/caregiver education on the expected discharge date and the discharge plan Yes   Do you have any problems affording any of your prescribed medications? No   Discharge Plan A Home   DME Needed Upon Discharge  none   Patient choice form signed by patient/caregiver N/A   Anticipated Discharge Disposition Home   Can the patient/caregiver answer the patient profile reliably? Yes, cognitively intact   Post-Acute Status   Post-Acute Placement Status Awaiting Internal Medical Clearance   Discharge Delays None known at this time

## 2020-11-06 NOTE — PROGRESS NOTES
[facility] Hospital Medicine     Telemedicine Progress Note     Team: [facility] VIRTUAL TEAM 10 Loretta Torres MD   Admit Date: 11/2/2020   Hospital Day: 1  LIZ: 11/6/2020   Code status: Full Code   Principal Problem: Pneumonia of both lower lobes due to infectious organism       This service was provided through telemedicine.  Patient was transferred to the telemedicine service on:  11/05/2020   The patient consents to virtual visits.   The patient location is: [facility]  Chief complaint:  Pneumonia of both lower lobes due to infectious organism   Start time: 952a    End time:  958a    Total time spent with patient: 6 min  Present with the patient at the time of the telemed/virtual assessment: n/a  I have assessed these findings virtually using a telemed platform and with assistance of bedside nurse.  The attending portion of this evaluation, treatment, and documentation was performed per Loretta Torres MD via audiovisual modality.        SUMMARY:     Mr Derek Joseph Weil is a 54 y.o. male with hx of hypertension presenting with [facility] with progressively worsening dry cough with associated fevers, chills, malaise, weakness, insomnia, dyspnea (dalia on exertion), chest congestion, decreased po intake, intermittent diarrhea since 10/28 (6 day hx). He stated that he was otherwise well before his symptoms. He denied any dizziness, syncope, trauma, pleurisy, N/V, bleeds. States these symptoms were similar to his piror episode of PNA 1 yr ago. His wife is diagnosed with COVID and has been symptomatic for the past week, now admitted to hospital. With concerns for COVID PNA, he presenting to the ED. Nonsmoker. No alcohol use or illicit drug use.     In the ED, patient was febrile  Tm 102.4, normotensive, saturating well on RA. Labs normal for normal wbc, COVID+, Na 132, CRP  112.4, Ferritin 789, , . CXR with multifocal PNA.     Admitted to hospital medicine for COVID PNA. He was started on supportive tx with inhalers, steroids, abx and oxygen PRN. Gradually improving however continues to have chills, weakness and dyspnea on exertion. Continuing to monitoring him at this time.      SUBJECTIVE:     Patient afebrile, on supplemental oxygen at 1L/min.  He continues to improve but does notice his is more dyspneic when he is active without supplemental oxygen in place. Dry cough remains sporadic and short-lived.  His appetite is back to normal; he has not had a BM in 2 days but had diarrhea previously.  He continues to use CPAP nightly and will complete remdesivir today.     Review of Systems:  Constitutional: fevers, chills,   Respiratory: + dry cough, shortness of breath, READ, chest congestion  Cardiovascular: no chest pain or palpitations  Gastrointestinal: no diarrhea , appetite improved, no nausea or vomiting, no abdominal pain  Integument/Breast: no rash or pruritis  Musculoskeletal: no arthralgias or myalgias  Neurological: no seizures or tremors  Behavioral/Psych: no depression or anxiety         OBJECTIVE:     Vitals:  Temp:  [97.3 °F (36.3 °C)-98.5 °F (36.9 °C)]   Pulse:  [59-77]   Resp:  [14-20]   BP: (127-158)/(60-85)   SpO2:  [90 %-94 %]      I & O (Last 24H):     Intake/Output Summary (Last 24 hours) at 11/6/2020 1204  Last data filed at 11/6/2020 0745  Gross per 24 hour   Intake 850 ml   Output 750 ml   Net 100 ml       Physical Exam  Vitals signs and nursing note reviewed.   Constitutional:       General: He is not in acute distress.     Appearance: He is normal weight. He is not ill-appearing.   HENT:      Head: Normocephalic and atraumatic.      Right Ear: Hearing normal.      Left Ear: Hearing normal.      Nose: Nose normal.   Eyes:      General: No scleral icterus.        Right eye: No discharge.         Left eye: No discharge.      Extraocular Movements:  Extraocular movements intact.   Cardiovascular:      Rate and Rhythm: Normal rate.   Pulmonary:      Effort: Pulmonary effort is normal. No accessory muscle usage or respiratory distress.   Skin:     Findings: No rash.   Neurological:      General: No focal deficit present.      Mental Status: He is alert and oriented to person, place, and time.      Cranial Nerves: No cranial nerve deficit.      Motor: No weakness.   Psychiatric:         Mood and Affect: Mood normal.         Behavior: Behavior normal.         Thought Content: Thought content normal.         Judgment: Judgment normal.           All recent labs and imaging has been reviewed.     Recent Results (from the past 24 hour(s))   POCT glucose    Collection Time: 11/05/20  9:46 PM   Result Value Ref Range    POCT Glucose 179 (H) 70 - 110 mg/dL   Comprehensive Metabolic Panel (CMP)    Collection Time: 11/06/20  3:23 AM   Result Value Ref Range    Sodium 139 136 - 145 mmol/L    Potassium 4.6 3.5 - 5.1 mmol/L    Chloride 102 95 - 110 mmol/L    CO2 26 23 - 29 mmol/L    Glucose 175 (H) 70 - 110 mg/dL    BUN 20 6 - 20 mg/dL    Creatinine 1.0 0.5 - 1.4 mg/dL    Calcium 8.5 (L) 8.7 - 10.5 mg/dL    Total Protein 6.5 6.0 - 8.4 g/dL    Albumin 2.7 (L) 3.5 - 5.2 g/dL    Total Bilirubin 0.3 0.1 - 1.0 mg/dL    Alkaline Phosphatase 54 (L) 55 - 135 U/L    AST 24 10 - 40 U/L    ALT 23 10 - 44 U/L    Anion Gap 11 8 - 16 mmol/L    eGFR if African American >60.0 >60 mL/min/1.73 m^2    eGFR if non African American >60.0 >60 mL/min/1.73 m^2   Magnesium    Collection Time: 11/06/20  3:23 AM   Result Value Ref Range    Magnesium 2.4 1.6 - 2.6 mg/dL   Phosphorus    Collection Time: 11/06/20  3:23 AM   Result Value Ref Range    Phosphorus 4.5 2.7 - 4.5 mg/dL   CBC with Automated Differential    Collection Time: 11/06/20  3:23 AM   Result Value Ref Range    WBC 11.51 3.90 - 12.70 K/uL    RBC 4.80 4.60 - 6.20 M/uL    Hemoglobin 14.9 14.0 - 18.0 g/dL    Hematocrit 45.3 40.0 - 54.0 %     MCV 94 82 - 98 fL    MCH 31.0 27.0 - 31.0 pg    MCHC 32.9 32.0 - 36.0 g/dL    RDW 12.8 11.5 - 14.5 %    Platelets 247 150 - 350 K/uL    MPV 10.3 9.2 - 12.9 fL    Immature Granulocytes 1.6 (H) 0.0 - 0.5 %    Gran # (ANC) 9.5 (H) 1.8 - 7.7 K/uL    Immature Grans (Abs) 0.18 (H) 0.00 - 0.04 K/uL    Lymph # 0.8 (L) 1.0 - 4.8 K/uL    Mono # 1.1 (H) 0.3 - 1.0 K/uL    Eos # 0.0 0.0 - 0.5 K/uL    Baso # 0.02 0.00 - 0.20 K/uL    nRBC 0 0 /100 WBC    Gran % 82.4 (H) 38.0 - 73.0 %    Lymph % 6.6 (L) 18.0 - 48.0 %    Mono % 9.1 4.0 - 15.0 %    Eosinophil % 0.1 0.0 - 8.0 %    Basophil % 0.2 0.0 - 1.9 %    Differential Method Automated    Ferritin    Collection Time: 11/06/20  3:23 AM   Result Value Ref Range    Ferritin 637 (H) 20.0 - 300.0 ng/mL   Lactate Dehydrogenase    Collection Time: 11/06/20  3:23 AM   Result Value Ref Range     (H) 110 - 260 U/L   D dimer, quantitative    Collection Time: 11/06/20  3:23 AM   Result Value Ref Range    D-Dimer 0.34 <0.50 mg/L FEU   C-Reactive Protein    Collection Time: 11/06/20  3:23 AM   Result Value Ref Range    CRP 53.7 (H) 0.0 - 8.2 mg/L       Recent Labs   Lab 11/05/20  2146   POCTGLUCOSE 179*       Hemoglobin A1C   Date Value Ref Range Status   02/11/2016 5.5 4.5 - 6.2 % Final     Hemoglobin A1c   Date Value Ref Range Status   02/27/2020 5.6 <5.7 % of total Hgb Final     Comment:     For the purpose of screening for the presence of  diabetes:    <5.7%       Consistent with the absence of diabetes  5.7-6.4%    Consistent with increased risk for diabetes              (prediabetes)  > or =6.5%  Consistent with diabetes    This assay result is consistent with a decreased risk  of diabetes.    Currently, no consensus exists regarding use of  hemoglobin A1c for diagnosis of diabetes in children.    According to American Diabetes Association (ADA)  guidelines, hemoglobin A1c <7.0% represents optimal  control in non-pregnant diabetic patients. Different  metrics may apply to specific  patient populations.   Standards of Medical Care in Diabetes(ADA).            Active Hospital Problems    Diagnosis  POA    *Pneumonia of both lower lobes due to infectious organism [J18.9]  Yes    Pneumonia due to COVID-19 virus [U07.1, J12.89]  Unknown    Hypoxia [R09.02]  Unknown    MARISA on CPAP [G47.33, Z99.89]  Not Applicable      Resolved Hospital Problems   No resolved problems to display.          ASSESSMENT AND PLAN:       COVID-19 Virus Infection:  Viral Pneumonia due to COVID-19:  -Pt tested for COVID-19 and noted to be positive on 11/2  -Isolation: Airborne/Droplet. Surgical mask on patient. Notify Infection Control  -Management: per [facility] COVID Treatment Protocol (4/15/20)  -Monitoring: Telemetry & Continuous Pulse Oximetry  -Antibiotics: Started on Levaquin in the ED, continue for total 5 days   -Nutrition:               -Multivitamin PO daily - ordered               -Add Boost supplement  - ordered               -Vitamin D 1000IU daily if deficient  - ordered               -Ascorbic acid 500mg PO bid  - ordered   -Supportive Care:              -Acetaminophen 650mg PO Q6hr PRN fever/headache  - ordered               -Loperamide PRN viral diarrhea  - ordered               -Robitussin, tessalon perls for cough  - ordered               -IVF if indicated, restrictive strategy preferred, no maintenance IV if able   -Other Therapies:  -Atorvastatin 40mg po daily - holding with elevated CPK              -Due to hypoxia, pt started on dexamethasone 6mg PO daily up to 10 days or until pt is discharged from hospital (whichever is sooner)  - ordered               -Pt meets criteria for remdesivir / tocilizumab. Pt provided verbal consent to start this medication and it being ordered/dosed per ID.   - ordered and started 11/3       Hypoxia  -+ hypoxic at this time with ambulation on 6 min walk test and anticipate patient will need home oxygen on discharge  -continue supplemental oxygen currently at 1 L/min;  lasix 20 mg IV X 1 today  -RT consult via Respiratory Communication for COVID Protocols  -If wheezing, albuterol INH Q6h scheduled & PRN  -Incentive Spirometer Q4h  -Flutter Valve Q4h  -Continuous pulse oximetry; titrate oxygen to keep sats between 92-96%  -Wean off O2 as tolerated    -Supplemental O2 via LFNC, VentiMask, or HFNC (see Respiratory Support Oxygen Therapies)  -Proning Protocol if patient is a candidate with GCS >13 and able to self-prone (see HM Proning Protocol)  -If deterioration, may warrant trial of NIPPV and transfer to [facility] pressure room or immediate ICU consult     Hypertension  - resume home meds (amlodipine 5 mg and nebivolol 2.5 mg daily)  - continue     MARISA on CPAP   - continue CPAP use nightly    Goals of care, counseling/discussion  -Reviewed the typical clinical course of COVID19 with patient, including the potential for acute decompensation requiring intubation and mechanical ventilation  -Discussed again as part of routine daily evaluation, patient/POA maintains code status of FULL CODE        VTE High Risk Prophylaxis: enoxaparin 40mg sq QHS @ 2100 (bundled care) if GFR >30  Dispo: DC home once medically ready.      Discharge Planning   LIZ: 11/7/2020     Code Status: Full Code   Is the patient medically ready for discharge?:     Reason for patient still in hospital (select all that apply): Patient trending condition  Discharge Plan A: Home   Discharge Delays: None known at this time      Loretta Torres MD  [facility]  [facility]  [facility] [phone]

## 2020-11-06 NOTE — PLAN OF CARE
A&Ox4. Free of falls. No pain. Patient rested comfortably overnight with minimal interruptions.   Problem: Adult Inpatient Plan of Care  Goal: Plan of Care Review  Outcome: Ongoing, Progressing  Goal: Patient-Specific Goal (Individualization)  Outcome: Ongoing, Progressing  Goal: Absence of Hospital-Acquired Illness or Injury  Outcome: Ongoing, Progressing  Goal: Optimal Comfort and Wellbeing  Outcome: Ongoing, Progressing  Goal: Readiness for Transition of Care  Outcome: Ongoing, Progressing  Goal: Rounds/Family Conference  Outcome: Ongoing, Progressing     Problem: Infection  Goal: Infection Symptom Resolution  Outcome: Ongoing, Progressing     Problem: Fluid Imbalance (Pneumonia)  Goal: Fluid Balance  Outcome: Ongoing, Progressing     Problem: Infection (Pneumonia)  Goal: Resolution of Infection Signs/Symptoms  Outcome: Ongoing, Progressing     Problem: Respiratory Compromise (Pneumonia)  Goal: Effective Oxygenation and Ventilation  Outcome: Ongoing, Progressing     Problem: Fall Injury Risk  Goal: Absence of Fall and Fall-Related Injury  Outcome: Ongoing, Progressing

## 2020-11-07 LAB
ALBUMIN SERPL BCP-MCNC: 2.7 G/DL (ref 3.5–5.2)
ALP SERPL-CCNC: 53 U/L (ref 55–135)
ALT SERPL W/O P-5'-P-CCNC: 31 U/L (ref 10–44)
ANION GAP SERPL CALC-SCNC: 11 MMOL/L (ref 8–16)
AST SERPL-CCNC: 26 U/L (ref 10–40)
BACTERIA BLD CULT: NORMAL
BACTERIA BLD CULT: NORMAL
BASOPHILS # BLD AUTO: 0.06 K/UL (ref 0–0.2)
BASOPHILS NFR BLD: 0.5 % (ref 0–1.9)
BILIRUB SERPL-MCNC: 0.4 MG/DL (ref 0.1–1)
BUN SERPL-MCNC: 21 MG/DL (ref 6–20)
CALCIUM SERPL-MCNC: 8.8 MG/DL (ref 8.7–10.5)
CHLORIDE SERPL-SCNC: 100 MMOL/L (ref 95–110)
CO2 SERPL-SCNC: 24 MMOL/L (ref 23–29)
CREAT SERPL-MCNC: 0.9 MG/DL (ref 0.5–1.4)
DIFFERENTIAL METHOD: ABNORMAL
EOSINOPHIL # BLD AUTO: 0 K/UL (ref 0–0.5)
EOSINOPHIL NFR BLD: 0.2 % (ref 0–8)
ERYTHROCYTE [DISTWIDTH] IN BLOOD BY AUTOMATED COUNT: 12.8 % (ref 11.5–14.5)
EST. GFR  (AFRICAN AMERICAN): >60 ML/MIN/1.73 M^2
EST. GFR  (NON AFRICAN AMERICAN): >60 ML/MIN/1.73 M^2
GLUCOSE SERPL-MCNC: 168 MG/DL (ref 70–110)
HCT VFR BLD AUTO: 46.8 % (ref 40–54)
HGB BLD-MCNC: 15.6 G/DL (ref 14–18)
IMM GRANULOCYTES # BLD AUTO: 0.4 K/UL (ref 0–0.04)
IMM GRANULOCYTES NFR BLD AUTO: 3.5 % (ref 0–0.5)
LYMPHOCYTES # BLD AUTO: 0.8 K/UL (ref 1–4.8)
LYMPHOCYTES NFR BLD: 7.1 % (ref 18–48)
MAGNESIUM SERPL-MCNC: 2.2 MG/DL (ref 1.6–2.6)
MCH RBC QN AUTO: 30.7 PG (ref 27–31)
MCHC RBC AUTO-ENTMCNC: 33.3 G/DL (ref 32–36)
MCV RBC AUTO: 92 FL (ref 82–98)
MONOCYTES # BLD AUTO: 1 K/UL (ref 0.3–1)
MONOCYTES NFR BLD: 9 % (ref 4–15)
NEUTROPHILS # BLD AUTO: 9.3 K/UL (ref 1.8–7.7)
NEUTROPHILS NFR BLD: 79.7 % (ref 38–73)
NRBC BLD-RTO: 0 /100 WBC
PHOSPHATE SERPL-MCNC: 4.5 MG/DL (ref 2.7–4.5)
PLATELET # BLD AUTO: 266 K/UL (ref 150–350)
PMV BLD AUTO: 9.9 FL (ref 9.2–12.9)
POCT GLUCOSE: 216 MG/DL (ref 70–110)
POCT GLUCOSE: 249 MG/DL (ref 70–110)
POTASSIUM SERPL-SCNC: 4.7 MMOL/L (ref 3.5–5.1)
PROT SERPL-MCNC: 6.7 G/DL (ref 6–8.4)
RBC # BLD AUTO: 5.08 M/UL (ref 4.6–6.2)
SODIUM SERPL-SCNC: 135 MMOL/L (ref 136–145)
WBC # BLD AUTO: 11.59 K/UL (ref 3.9–12.7)

## 2020-11-07 PROCEDURE — 94761 N-INVAS EAR/PLS OXIMETRY MLT: CPT

## 2020-11-07 PROCEDURE — 36415 COLL VENOUS BLD VENIPUNCTURE: CPT

## 2020-11-07 PROCEDURE — 94640 AIRWAY INHALATION TREATMENT: CPT

## 2020-11-07 PROCEDURE — 99900035 HC TECH TIME PER 15 MIN (STAT)

## 2020-11-07 PROCEDURE — 20600001 HC STEP DOWN PRIVATE ROOM

## 2020-11-07 PROCEDURE — 25000003 PHARM REV CODE 250: Performed by: INTERNAL MEDICINE

## 2020-11-07 PROCEDURE — 99233 PR SUBSEQUENT HOSPITAL CARE,LEVL III: ICD-10-PCS | Mod: ,,, | Performed by: INTERNAL MEDICINE

## 2020-11-07 PROCEDURE — 27000221 HC OXYGEN, UP TO 24 HOURS

## 2020-11-07 PROCEDURE — 63600175 PHARM REV CODE 636 W HCPCS: Performed by: INTERNAL MEDICINE

## 2020-11-07 PROCEDURE — 80053 COMPREHEN METABOLIC PANEL: CPT

## 2020-11-07 PROCEDURE — 25000242 PHARM REV CODE 250 ALT 637 W/ HCPCS: Performed by: INTERNAL MEDICINE

## 2020-11-07 PROCEDURE — 99233 SBSQ HOSP IP/OBS HIGH 50: CPT | Mod: ,,, | Performed by: INTERNAL MEDICINE

## 2020-11-07 PROCEDURE — 94664 DEMO&/EVAL PT USE INHALER: CPT

## 2020-11-07 PROCEDURE — 83735 ASSAY OF MAGNESIUM: CPT

## 2020-11-07 PROCEDURE — 85025 COMPLETE CBC W/AUTO DIFF WBC: CPT

## 2020-11-07 PROCEDURE — 94799 UNLISTED PULMONARY SVC/PX: CPT

## 2020-11-07 PROCEDURE — 84100 ASSAY OF PHOSPHORUS: CPT

## 2020-11-07 RX ORDER — INSULIN ASPART 100 [IU]/ML
0-5 INJECTION, SOLUTION INTRAVENOUS; SUBCUTANEOUS
Status: DISCONTINUED | OUTPATIENT
Start: 2020-11-07 | End: 2020-11-11 | Stop reason: HOSPADM

## 2020-11-07 RX ORDER — AMLODIPINE BESYLATE 5 MG/1
5 TABLET ORAL 2 TIMES DAILY
Status: DISCONTINUED | OUTPATIENT
Start: 2020-11-07 | End: 2020-11-11 | Stop reason: HOSPADM

## 2020-11-07 RX ORDER — HYDRALAZINE HYDROCHLORIDE 25 MG/1
25 TABLET, FILM COATED ORAL EVERY 6 HOURS PRN
Status: DISCONTINUED | OUTPATIENT
Start: 2020-11-07 | End: 2020-11-11 | Stop reason: HOSPADM

## 2020-11-07 RX ORDER — LIDOCAINE 50 MG/G
2 PATCH TOPICAL
Status: DISCONTINUED | OUTPATIENT
Start: 2020-11-07 | End: 2020-11-11 | Stop reason: HOSPADM

## 2020-11-07 RX ORDER — AMLODIPINE BESYLATE 2.5 MG/1
2.5 TABLET ORAL
Status: DISCONTINUED | OUTPATIENT
Start: 2020-11-07 | End: 2020-11-07

## 2020-11-07 RX ADMIN — MELATONIN TAB 3 MG 6 MG: 3 TAB at 09:11

## 2020-11-07 RX ADMIN — Medication 500 MG: at 09:11

## 2020-11-07 RX ADMIN — DOCUSATE SODIUM 50MG AND SENNOSIDES 8.6MG 1 TABLET: 8.6; 5 TABLET, FILM COATED ORAL at 08:11

## 2020-11-07 RX ADMIN — AMLODIPINE BESYLATE 5 MG: 5 TABLET ORAL at 09:11

## 2020-11-07 RX ADMIN — AMLODIPINE BESYLATE 5 MG: 5 TABLET ORAL at 02:11

## 2020-11-07 RX ADMIN — NEBIVOLOL HYDROCHLORIDE 2.5 MG: 2.5 TABLET ORAL at 08:11

## 2020-11-07 RX ADMIN — Medication 500 MG: at 08:11

## 2020-11-07 RX ADMIN — Medication 1000 UNITS: at 08:11

## 2020-11-07 RX ADMIN — DEXAMETHASONE 6 MG: 4 TABLET ORAL at 08:11

## 2020-11-07 RX ADMIN — REMDESIVIR 100 MG: 100 INJECTION, POWDER, LYOPHILIZED, FOR SOLUTION INTRAVENOUS at 03:11

## 2020-11-07 RX ADMIN — THERA TABS 1 TABLET: TAB at 08:11

## 2020-11-07 RX ADMIN — IPRATROPIUM BROMIDE AND ALBUTEROL SULFATE 3 ML: .5; 2.5 SOLUTION RESPIRATORY (INHALATION) at 07:11

## 2020-11-07 RX ADMIN — ENOXAPARIN SODIUM 40 MG: 40 INJECTION SUBCUTANEOUS at 09:11

## 2020-11-07 RX ADMIN — POLYETHYLENE GLYCOL 3350 17 G: 17 POWDER, FOR SOLUTION ORAL at 08:11

## 2020-11-07 RX ADMIN — IPRATROPIUM BROMIDE AND ALBUTEROL SULFATE 3 ML: .5; 2.5 SOLUTION RESPIRATORY (INHALATION) at 01:11

## 2020-11-07 NOTE — PROGRESS NOTES
[facility] Hospital Medicine     Telemedicine Progress Note     Team: [facility] VIRTUAL TEAM 10 Loretta Torres MD   Admit Date: 11/2/2020   Hospital Day: 2  LIZ: 11/8/2020   Code status: Full Code   Principal Problem: Pneumonia of both lower lobes due to infectious organism       This service was provided through telemedicine.  Patient was transferred to the telemedicine service on:  11/05/2020   The patient consents to virtual visits.   The patient location is: [facility]  Chief complaint:  Pneumonia of both lower lobes due to infectious organism   Start time: 952a    End time:  958a    Total time spent with patient: 6 min  Present with the patient at the time of the telemed/virtual assessment: n/a  I have assessed these findings virtually using a telemed platform and with assistance of bedside nurse.  The attending portion of this evaluation, treatment, and documentation was performed per Loretta Torres MD via audiovisual modality.        SUMMARY:     Mr Derek Joseph Weil is a 54 y.o. male with hx of hypertension presenting with [facility] with progressively worsening dry cough with associated fevers, chills, malaise, weakness, insomnia, dyspnea (dalia on exertion), chest congestion, decreased po intake, intermittent diarrhea since 10/28 (6 day hx). He stated that he was otherwise well before his symptoms. He denied any dizziness, syncope, trauma, pleurisy, N/V, bleeds. States these symptoms were similar to his piror episode of PNA 1 yr ago. His wife is diagnosed with COVID and has been symptomatic for the past week, now admitted to hospital. With concerns for COVID PNA, he presenting to the ED. Nonsmoker. No alcohol use or illicit drug use.     In the ED, patient was febrile  Tm 102.4, normotensive, saturating well on RA. Labs normal for normal wbc, COVID+, Na 132, CRP  112.4, Ferritin 789, , . CXR with multifocal PNA.     Admitted to hospital medicine for COVID PNA. He was started on supportive tx with inhalers, steroids, abx and oxygen PRN. Gradually improving however continues to have chills, weakness and dyspnea on exertion. Continuing to monitoring him at this time.      SUBJECTIVE:     Patient afebrile, on supplemental oxygen at 3L/min - increased in the past 24 hours but patient denies increased dyspnea    He was feeling better this am but was lightheaded after walk test not relieved with oxygen; orthostatics negative; cough improved; has not been sitting in chair due to chronic back pain - does not take meds for this at home; lidoderm patch ordered; he will continue to sit up and try to sit in chair today;    He continues to use CPAP nightly and will complete remdesivir today.     Review of Systems:  Constitutional: fevers, chills,   Respiratory: + dry cough, shortness of breath, READ, chest congestion  Cardiovascular: no chest pain or palpitations  Gastrointestinal: no diarrhea , appetite improved, no nausea or vomiting, no abdominal pain  Integument/Breast: no rash or pruritis  Musculoskeletal: no arthralgias or myalgias  Neurological: no seizures or tremors  Behavioral/Psych: no depression or anxiety         OBJECTIVE:     Vitals:  Temp:  [97.8 °F (36.6 °C)-98.8 °F (37.1 °C)]   Pulse:  [53-69]   Resp:  [15-22]   BP: (146-174)/(65-94)   SpO2:  [90 %-96 %]      I & O (Last 24H):     Intake/Output Summary (Last 24 hours) at 11/7/2020 1559  Last data filed at 11/7/2020 0800  Gross per 24 hour   Intake 600 ml   Output 750 ml   Net -150 ml       Physical Exam  Vitals signs and nursing note reviewed.   Constitutional:       General: He is not in acute distress.     Appearance: He is normal weight. He is not ill-appearing.   HENT:      Head: Normocephalic and atraumatic.      Right Ear: Hearing normal.      Left Ear: Hearing normal.      Nose: Nose normal.   Eyes:       General: No scleral icterus.        Right eye: No discharge.         Left eye: No discharge.      Extraocular Movements: Extraocular movements intact.   Cardiovascular:      Rate and Rhythm: Normal rate.   Pulmonary:      Effort: Pulmonary effort is normal. No accessory muscle usage or respiratory distress.   Skin:     Findings: No rash.   Neurological:      General: No focal deficit present.      Mental Status: He is alert and oriented to person, place, and time.      Cranial Nerves: No cranial nerve deficit.      Motor: No weakness.   Psychiatric:         Mood and Affect: Mood normal.         Behavior: Behavior normal.         Thought Content: Thought content normal.         Judgment: Judgment normal.           All recent labs and imaging has been reviewed.     Recent Results (from the past 24 hour(s))   Comprehensive Metabolic Panel (CMP)    Collection Time: 11/07/20  3:04 AM   Result Value Ref Range    Sodium 135 (L) 136 - 145 mmol/L    Potassium 4.7 3.5 - 5.1 mmol/L    Chloride 100 95 - 110 mmol/L    CO2 24 23 - 29 mmol/L    Glucose 168 (H) 70 - 110 mg/dL    BUN 21 (H) 6 - 20 mg/dL    Creatinine 0.9 0.5 - 1.4 mg/dL    Calcium 8.8 8.7 - 10.5 mg/dL    Total Protein 6.7 6.0 - 8.4 g/dL    Albumin 2.7 (L) 3.5 - 5.2 g/dL    Total Bilirubin 0.4 0.1 - 1.0 mg/dL    Alkaline Phosphatase 53 (L) 55 - 135 U/L    AST 26 10 - 40 U/L    ALT 31 10 - 44 U/L    Anion Gap 11 8 - 16 mmol/L    eGFR if African American >60.0 >60 mL/min/1.73 m^2    eGFR if non African American >60.0 >60 mL/min/1.73 m^2   Magnesium    Collection Time: 11/07/20  3:04 AM   Result Value Ref Range    Magnesium 2.2 1.6 - 2.6 mg/dL   Phosphorus    Collection Time: 11/07/20  3:04 AM   Result Value Ref Range    Phosphorus 4.5 2.7 - 4.5 mg/dL   CBC with Automated Differential    Collection Time: 11/07/20  3:04 AM   Result Value Ref Range    WBC 11.59 3.90 - 12.70 K/uL    RBC 5.08 4.60 - 6.20 M/uL    Hemoglobin 15.6 14.0 - 18.0 g/dL    Hematocrit 46.8 40.0 -  54.0 %    MCV 92 82 - 98 fL    MCH 30.7 27.0 - 31.0 pg    MCHC 33.3 32.0 - 36.0 g/dL    RDW 12.8 11.5 - 14.5 %    Platelets 266 150 - 350 K/uL    MPV 9.9 9.2 - 12.9 fL    Immature Granulocytes 3.5 (H) 0.0 - 0.5 %    Gran # (ANC) 9.3 (H) 1.8 - 7.7 K/uL    Immature Grans (Abs) 0.40 (H) 0.00 - 0.04 K/uL    Lymph # 0.8 (L) 1.0 - 4.8 K/uL    Mono # 1.0 0.3 - 1.0 K/uL    Eos # 0.0 0.0 - 0.5 K/uL    Baso # 0.06 0.00 - 0.20 K/uL    nRBC 0 0 /100 WBC    Gran % 79.7 (H) 38.0 - 73.0 %    Lymph % 7.1 (L) 18.0 - 48.0 %    Mono % 9.0 4.0 - 15.0 %    Eosinophil % 0.2 0.0 - 8.0 %    Basophil % 0.5 0.0 - 1.9 %    Differential Method Automated        Recent Labs   Lab 11/05/20  2146   POCTGLUCOSE 179*       Hemoglobin A1C   Date Value Ref Range Status   02/11/2016 5.5 4.5 - 6.2 % Final     Hemoglobin A1c   Date Value Ref Range Status   02/27/2020 5.6 <5.7 % of total Hgb Final     Comment:     For the purpose of screening for the presence of  diabetes:    <5.7%       Consistent with the absence of diabetes  5.7-6.4%    Consistent with increased risk for diabetes              (prediabetes)  > or =6.5%  Consistent with diabetes    This assay result is consistent with a decreased risk  of diabetes.    Currently, no consensus exists regarding use of  hemoglobin A1c for diagnosis of diabetes in children.    According to American Diabetes Association (ADA)  guidelines, hemoglobin A1c <7.0% represents optimal  control in non-pregnant diabetic patients. Different  metrics may apply to specific patient populations.   Standards of Medical Care in Diabetes(ADA).            Active Hospital Problems    Diagnosis  POA    *Pneumonia of both lower lobes due to infectious organism [J18.9]  Yes    Pneumonia due to COVID-19 virus [U07.1, J12.89]  Yes    Hypoxia [R09.02]  Yes    MARISA on CPAP [G47.33, Z99.89]  Not Applicable      Resolved Hospital Problems   No resolved problems to display.          ASSESSMENT AND PLAN:       COVID-19 Virus  Infection:  Viral Pneumonia due to COVID-19:  -Pt tested for COVID-19 and noted to be positive on 11/2  -Isolation: Airborne/Droplet. Surgical mask on patient. Notify Infection Control  -Management: per [facility] COVID Treatment Protocol (4/15/20)  -Monitoring: Telemetry & Continuous Pulse Oximetry  -Antibiotics: Started on Levaquin in the ED, continue for total 5 days   -Nutrition:               -Multivitamin PO daily - ordered               -Add Boost supplement  - ordered               -Vitamin D 1000IU daily if deficient  - ordered               -Ascorbic acid 500mg PO bid  - ordered   -Supportive Care:              -Acetaminophen 650mg PO Q6hr PRN fever/headache  - ordered               -Loperamide PRN viral diarrhea  - ordered               -Robitussin, tessalon perls for cough  - ordered               -IVF if indicated, restrictive strategy preferred, no maintenance IV if able   -Other Therapies:  -Atorvastatin 40mg po daily - holding with elevated CPK              -Due to hypoxia, pt started on dexamethasone 6mg PO daily up to 10 days or until pt is discharged from hospital (whichever is sooner)  - ordered               -Pt meets criteria for remdesivir / tocilizumab. Pt provided verbal consent to start this medication and it being ordered/dosed per ID.   - ordered and started 11/3       Hypoxia  -+ hypoxic at this time with ambulation on 6 min walk test and anticipate patient will need home oxygen on discharge  -continue supplemental oxygen currently at 3 L/min; lasix 20 mg IV X 1 on 11/6; monitor closely for worsening of respiratory status  -continue CPAP nightly  -RT consult via Respiratory Communication for COVID Protocols  -If wheezing, albuterol INH Q6h scheduled & PRN  -Incentive Spirometer Q4h  -Flutter Valve Q4h  -Continuous pulse oximetry; titrate oxygen to keep sats between 92-96%  -Wean off O2 as tolerated    -Supplemental O2 via LFNC, VentiMask, or HFNC (see Respiratory Support Oxygen  Therapies)  -Proning Protocol if patient is a candidate with GCS >13 and able to self-prone (see  Proning Protocol)  -If deterioration, may warrant trial of NIPPV and transfer to neg pressure room or immediate ICU consult     Hypertension - worsened due to steroids  - resumed home meds (amlodipine 5 mg and nebivolol 2.5 mg daily)  - increase amlodipine to BID with hydralazine prn    MARISA on CPAP   - continue CPAP use nightly    Steroid induced hyperglycemia  Normal HgbA1C 2/2020  Start AC/HS monitoring with SSNI prn    Goals of care, counseling/discussion  -Reviewed the typical clinical course of COVID19 with patient, including the potential for acute decompensation requiring intubation and mechanical ventilation  -Discussed again as part of routine daily evaluation, patient/POA maintains code status of FULL CODE        VTE High Risk Prophylaxis: enoxaparin 40mg sq QHS @ 2100 (bundled care) if GFR >30  Dispo: DC home once medically ready.      Discharge Planning   LIZ: 11/8/2020     Code Status: Full Code   Is the patient medically ready for discharge?:     Reason for patient still in hospital (select all that apply): Patient trending condition  Discharge Plan A: Home   Discharge Delays: None known at this time      Loretta Torres MD  [facility] Medicine  [facility]  [facility] [phone]

## 2020-11-07 NOTE — PLAN OF CARE
VSS. rested comfortably overnight. Still requiring supplemental o2.   Problem: Adult Inpatient Plan of Care  Goal: Plan of Care Review  Outcome: Ongoing, Progressing  Goal: Patient-Specific Goal (Individualization)  Outcome: Ongoing, Progressing  Goal: Absence of Hospital-Acquired Illness or Injury  Outcome: Ongoing, Progressing  Goal: Optimal Comfort and Wellbeing  Outcome: Ongoing, Progressing  Goal: Readiness for Transition of Care  Outcome: Ongoing, Progressing  Goal: Rounds/Family Conference  Outcome: Ongoing, Progressing     Problem: Infection  Goal: Infection Symptom Resolution  Outcome: Ongoing, Progressing     Problem: Fluid Imbalance (Pneumonia)  Goal: Fluid Balance  Outcome: Ongoing, Progressing     Problem: Infection (Pneumonia)  Goal: Resolution of Infection Signs/Symptoms  Outcome: Ongoing, Progressing     Problem: Respiratory Compromise (Pneumonia)  Goal: Effective Oxygenation and Ventilation  Outcome: Ongoing, Progressing     Problem: Fall Injury Risk  Goal: Absence of Fall and Fall-Related Injury  Outcome: Ongoing, Progressing

## 2020-11-07 NOTE — NURSING
Home Oxygen Evaluation    Date Performed: 2020    1) Patient's Home O2 Sat on room air, while at rest: 91%        If O2 sats on room air at rest are 88% or below, patient qualifies. No additional testing needed. Document N/A in steps 2 and 3. If 89% or above, complete steps 2.      2) Patient's O2 Sat on room air while exercisin%        If O2 sats on room air while exercising remain 89% or above patient does not qualify, no further testing needed Document N/A in step 3. If O2 sats on room air while exercising are 88% or below, continue to step 3.      3) Patient's O2 Sat while exercising on O2: 92% at 2 LPM         (Must show improvement from #2 for patients to qualify)    If O2 sats improve on oxygen, patient qualifies for portable oxygen. If not, the patient does not qualify.

## 2020-11-08 LAB
CRP SERPL-MCNC: 20 MG/L (ref 0–8.2)
D DIMER PPP IA.FEU-MCNC: 0.41 MG/L FEU
FERRITIN SERPL-MCNC: 658 NG/ML (ref 20–300)
LDH SERPL L TO P-CCNC: 417 U/L (ref 110–260)
POCT GLUCOSE: 122 MG/DL (ref 70–110)
POCT GLUCOSE: 201 MG/DL (ref 70–110)
POCT GLUCOSE: 256 MG/DL (ref 70–110)
POCT GLUCOSE: 264 MG/DL (ref 70–110)
PROCALCITONIN SERPL IA-MCNC: 0.06 NG/ML

## 2020-11-08 PROCEDURE — 36415 COLL VENOUS BLD VENIPUNCTURE: CPT

## 2020-11-08 PROCEDURE — 85379 FIBRIN DEGRADATION QUANT: CPT

## 2020-11-08 PROCEDURE — 84145 PROCALCITONIN (PCT): CPT

## 2020-11-08 PROCEDURE — 83615 LACTATE (LD) (LDH) ENZYME: CPT

## 2020-11-08 PROCEDURE — 94799 UNLISTED PULMONARY SVC/PX: CPT

## 2020-11-08 PROCEDURE — 25000003 PHARM REV CODE 250: Performed by: INTERNAL MEDICINE

## 2020-11-08 PROCEDURE — 63600175 PHARM REV CODE 636 W HCPCS: Performed by: INTERNAL MEDICINE

## 2020-11-08 PROCEDURE — 86140 C-REACTIVE PROTEIN: CPT

## 2020-11-08 PROCEDURE — 99900035 HC TECH TIME PER 15 MIN (STAT)

## 2020-11-08 PROCEDURE — 99232 PR SUBSEQUENT HOSPITAL CARE,LEVL II: ICD-10-PCS | Mod: ,,, | Performed by: INTERNAL MEDICINE

## 2020-11-08 PROCEDURE — 99232 SBSQ HOSP IP/OBS MODERATE 35: CPT | Mod: ,,, | Performed by: INTERNAL MEDICINE

## 2020-11-08 PROCEDURE — 94664 DEMO&/EVAL PT USE INHALER: CPT

## 2020-11-08 PROCEDURE — 94640 AIRWAY INHALATION TREATMENT: CPT

## 2020-11-08 PROCEDURE — 94761 N-INVAS EAR/PLS OXIMETRY MLT: CPT

## 2020-11-08 PROCEDURE — 20600001 HC STEP DOWN PRIVATE ROOM

## 2020-11-08 PROCEDURE — 82728 ASSAY OF FERRITIN: CPT

## 2020-11-08 PROCEDURE — 27000221 HC OXYGEN, UP TO 24 HOURS

## 2020-11-08 PROCEDURE — 25000242 PHARM REV CODE 250 ALT 637 W/ HCPCS: Performed by: INTERNAL MEDICINE

## 2020-11-08 RX ORDER — AMOXICILLIN 250 MG
1 CAPSULE ORAL 2 TIMES DAILY
Status: DISCONTINUED | OUTPATIENT
Start: 2020-11-08 | End: 2020-11-11 | Stop reason: HOSPADM

## 2020-11-08 RX ORDER — FUROSEMIDE 10 MG/ML
40 INJECTION INTRAMUSCULAR; INTRAVENOUS ONCE
Status: COMPLETED | OUTPATIENT
Start: 2020-11-08 | End: 2020-11-08

## 2020-11-08 RX ORDER — BISACODYL 5 MG
10 TABLET, DELAYED RELEASE (ENTERIC COATED) ORAL ONCE
Status: COMPLETED | OUTPATIENT
Start: 2020-11-08 | End: 2020-11-08

## 2020-11-08 RX ADMIN — ENOXAPARIN SODIUM 40 MG: 40 INJECTION SUBCUTANEOUS at 09:11

## 2020-11-08 RX ADMIN — DEXAMETHASONE 6 MG: 4 TABLET ORAL at 08:11

## 2020-11-08 RX ADMIN — IPRATROPIUM BROMIDE AND ALBUTEROL SULFATE 3 ML: .5; 2.5 SOLUTION RESPIRATORY (INHALATION) at 07:11

## 2020-11-08 RX ADMIN — BISACODYL 10 MG: 5 TABLET, COATED ORAL at 02:11

## 2020-11-08 RX ADMIN — AMLODIPINE BESYLATE 5 MG: 5 TABLET ORAL at 09:11

## 2020-11-08 RX ADMIN — IPRATROPIUM BROMIDE AND ALBUTEROL SULFATE 3 ML: .5; 2.5 SOLUTION RESPIRATORY (INHALATION) at 08:11

## 2020-11-08 RX ADMIN — IPRATROPIUM BROMIDE AND ALBUTEROL SULFATE 3 ML: .5; 2.5 SOLUTION RESPIRATORY (INHALATION) at 02:11

## 2020-11-08 RX ADMIN — THERA TABS 1 TABLET: TAB at 08:11

## 2020-11-08 RX ADMIN — Medication 1000 UNITS: at 08:11

## 2020-11-08 RX ADMIN — Medication 500 MG: at 09:11

## 2020-11-08 RX ADMIN — NEBIVOLOL HYDROCHLORIDE 2.5 MG: 2.5 TABLET ORAL at 08:11

## 2020-11-08 RX ADMIN — MELATONIN TAB 3 MG 6 MG: 3 TAB at 09:11

## 2020-11-08 RX ADMIN — IPRATROPIUM BROMIDE AND ALBUTEROL SULFATE 3 ML: .5; 2.5 SOLUTION RESPIRATORY (INHALATION) at 01:11

## 2020-11-08 RX ADMIN — AMLODIPINE BESYLATE 5 MG: 5 TABLET ORAL at 08:11

## 2020-11-08 RX ADMIN — Medication 500 MG: at 08:11

## 2020-11-08 RX ADMIN — FUROSEMIDE 40 MG: 10 INJECTION, SOLUTION INTRAMUSCULAR; INTRAVENOUS at 04:11

## 2020-11-08 NOTE — PROGRESS NOTES
[facility] Hospital Medicine     Telemedicine Progress Note     Team: [facility] VIRTUAL TEAM 10 Loretta Torres MD   Admit Date: 11/2/2020   Hospital Day: 3  LIZ: 11/8/2020   Code status: Full Code   Principal Problem: Pneumonia of both lower lobes due to infectious organism       This service was provided through telemedicine.  Patient was transferred to the telemedicine service on:  11/05/2020   The patient consents to virtual visits.   The patient location is: [facility]  Chief complaint:  Pneumonia of both lower lobes due to infectious organism   Start time: 1144a    End time:  1157a    Total time spent with patient: 13 min  Present with the patient at the time of the telemed/virtual assessment: n/a  I have assessed these findings virtually using a telemed platform and with assistance of bedside nurse.  The attending portion of this evaluation, treatment, and documentation was performed per Loretta Torres MD via audiovisual modality.        SUMMARY:     Mr Derek Joseph Weil is a 54 y.o. male with hx of hypertension presenting with [facility] with progressively worsening dry cough with associated fevers, chills, malaise, weakness, insomnia, dyspnea (dalia on exertion), chest congestion, decreased po intake, intermittent diarrhea since 10/28 (6 day hx). He stated that he was otherwise well before his symptoms. He denied any dizziness, syncope, trauma, pleurisy, N/V, bleeds. States these symptoms were similar to his piror episode of PNA 1 yr ago. His wife is diagnosed with COVID and has been symptomatic for the past week, now admitted to hospital. With concerns for COVID PNA, he presenting to the ED. Nonsmoker. No alcohol use or illicit drug use.     In the ED, patient was febrile  Tm 102.4, normotensive, saturating well on RA. Labs normal for normal wbc, COVID+, Na 132, CRP  112.4, Ferritin 789, , . CXR with multifocal PNA.     Admitted to hospital medicine for COVID PNA. He was started on supportive tx with inhalers, steroids, abx and oxygen PRN. Gradually improving however continues to have chills, weakness and dyspnea on exertion. Continuing to monitoring him at this time.      SUBJECTIVE:     Patient afebrile, on supplemental oxygen at 2L/min     Felt he had chills overnight with no fever documented; he does notice that he is lightheaded when he does not have the oxygen in place; no dysuria; CXR done with increased infiltrates; cough seems increased and problematic at night    He is concerned because his wife returned to the ED today after being discharged with COVID infection    Review of Systems:  Constitutional: fevers, chills,   Respiratory: + dry cough, shortness of breath, READ, chest congestion  Cardiovascular: no chest pain or palpitations  Gastrointestinal: no diarrhea , appetite improved, no nausea or vomiting, no abdominal pain  Integument/Breast: no rash or pruritis  Musculoskeletal: no arthralgias or myalgias  Neurological: no seizures or tremors  Behavioral/Psych: no depression or anxiety         OBJECTIVE:     Vitals:  Temp:  [97.4 °F (36.3 °C)-98.6 °F (37 °C)]   Pulse:  [47-69]   Resp:  [16-20]   BP: (136-155)/(75-82)   SpO2:  [87 %-94 %]      I & O (Last 24H):     Intake/Output Summary (Last 24 hours) at 11/8/2020 1516  Last data filed at 11/8/2020 1200  Gross per 24 hour   Intake 836 ml   Output 300 ml   Net 536 ml       Physical Exam  Vitals signs and nursing note reviewed.   Constitutional:       General: He is not in acute distress.     Appearance: He is normal weight. He is not ill-appearing.   HENT:      Head: Normocephalic and atraumatic.      Right Ear: Hearing normal.      Left Ear: Hearing normal.      Nose: Nose normal.   Eyes:      General: No scleral icterus.        Right eye: No discharge.         Left eye: No discharge.      Extraocular  Movements: Extraocular movements intact.   Cardiovascular:      Rate and Rhythm: Normal rate.   Pulmonary:      Effort: Pulmonary effort is normal. No accessory muscle usage or respiratory distress.   Skin:     Findings: No rash.   Neurological:      General: No focal deficit present.      Mental Status: He is alert and oriented to person, place, and time.      Cranial Nerves: No cranial nerve deficit.      Motor: No weakness.   Psychiatric:         Mood and Affect: Mood normal.         Behavior: Behavior normal.         Thought Content: Thought content normal.         Judgment: Judgment normal.           All recent labs and imaging has been reviewed.     Recent Results (from the past 24 hour(s))   POCT glucose    Collection Time: 11/07/20  4:59 PM   Result Value Ref Range    POCT Glucose 249 (H) 70 - 110 mg/dL   POCT glucose    Collection Time: 11/07/20  9:06 PM   Result Value Ref Range    POCT Glucose 216 (H) 70 - 110 mg/dL   Ferritin    Collection Time: 11/08/20  3:09 AM   Result Value Ref Range    Ferritin 658 (H) 20.0 - 300.0 ng/mL   Lactate Dehydrogenase    Collection Time: 11/08/20  3:09 AM   Result Value Ref Range     (H) 110 - 260 U/L   C-Reactive Protein    Collection Time: 11/08/20  3:09 AM   Result Value Ref Range    CRP 20.0 (H) 0.0 - 8.2 mg/L   D dimer, quantitative    Collection Time: 11/08/20  3:10 AM   Result Value Ref Range    D-Dimer 0.41 <0.50 mg/L FEU   POCT glucose    Collection Time: 11/08/20  7:49 AM   Result Value Ref Range    POCT Glucose 122 (H) 70 - 110 mg/dL   POCT glucose    Collection Time: 11/08/20 11:10 AM   Result Value Ref Range    POCT Glucose 201 (H) 70 - 110 mg/dL       Recent Labs   Lab 11/05/20  2146 11/07/20  1659 11/07/20  2106 11/08/20  0749 11/08/20  1110   POCTGLUCOSE 179* 249* 216* 122* 201*       Hemoglobin A1C   Date Value Ref Range Status   02/11/2016 5.5 4.5 - 6.2 % Final     Hemoglobin A1c   Date Value Ref Range Status   02/27/2020 5.6 <5.7 % of total Hgb  Final     Comment:     For the purpose of screening for the presence of  diabetes:    <5.7%       Consistent with the absence of diabetes  5.7-6.4%    Consistent with increased risk for diabetes              (prediabetes)  > or =6.5%  Consistent with diabetes    This assay result is consistent with a decreased risk  of diabetes.    Currently, no consensus exists regarding use of  hemoglobin A1c for diagnosis of diabetes in children.    According to American Diabetes Association (ADA)  guidelines, hemoglobin A1c <7.0% represents optimal  control in non-pregnant diabetic patients. Different  metrics may apply to specific patient populations.   Standards of Medical Care in Diabetes(ADA).            Active Hospital Problems    Diagnosis  POA    *Pneumonia of both lower lobes due to infectious organism [J18.9]  Yes    Pneumonia due to COVID-19 virus [U07.1, J12.89]  Yes    Hypoxia [R09.02]  Yes    MARISA on CPAP [G47.33, Z99.89]  Not Applicable      Resolved Hospital Problems   No resolved problems to display.          ASSESSMENT AND PLAN:       COVID-19 Virus Infection:  Viral Pneumonia due to COVID-19:  -Pt tested for COVID-19 and noted to be positive on 11/2  -Isolation: Airborne/Droplet. Surgical mask on patient. Notify Infection Control  -Management: per [facility] COVID Treatment Protocol (4/15/20)  -Monitoring: Telemetry & Continuous Pulse Oximetry  -Antibiotics: Started on Levaquin in the ED, continue for total 5 days   -Nutrition:               -Multivitamin PO daily - ordered               -Add Boost supplement  - ordered               -Vitamin D 1000IU daily if deficient  - ordered               -Ascorbic acid 500mg PO bid  - ordered   -Supportive Care:              -Acetaminophen 650mg PO Q6hr PRN fever/headache  - ordered               -Loperamide PRN viral diarrhea  - ordered               -Robitussin, tessalon perls for cough  - ordered               -IVF if indicated, restrictive strategy preferred, no  maintenance IV if able   -Other Therapies:  -Atorvastatin 40mg po daily - holding with elevated CPK              -Due to hypoxia, pt started on dexamethasone 6mg PO daily up to 10 days or until pt is discharged from hospital (whichever is sooner)  - ordered               -Pt meets criteria for remdesivir / tocilizumab. Pt provided verbal consent to start this medication and it being ordered/dosed per ID.   - ordered and started 11/3  - increased chills and cough on 11/8 but afebrile; repeat CXR with increased infiltrates and effusion - lasix diuresis inititated       Hypoxia  -+ hypoxic at this time with ambulation on 6 min walk test and anticipate patient will need home oxygen on discharge  -continue supplemental oxygen currently at 2 L/min; lasix 20 mg IV X 1 on 11/6; monitor closely for worsening of respiratory status  -continue CPAP nightly; repeat lasix on 11/8  -Echo ordered  -RT consult via Respiratory Communication for COVID Protocols  -If wheezing, albuterol INH Q6h scheduled & PRN  -Incentive Spirometer Q4h  -Flutter Valve Q4h  -Continuous pulse oximetry; titrate oxygen to keep sats between 92-96%  -Wean off O2 as tolerated    -Supplemental O2 via LFNC, VentiMask, or HFNC (see Respiratory Support Oxygen Therapies)  -Proning Protocol if patient is a candidate with GCS >13 and able to self-prone (see  Proning Protocol)  -If deterioration, may warrant trial of NIPPV and transfer to [facility] pressure room or immediate ICU consult     Hypertension - worsened due to steroids  - resumed home meds (amlodipine 5 mg and nebivolol 2.5 mg daily)  - increase amlodipine to BID with hydralazine prn    MARISA on CPAP   - continue CPAP use nightly    Steroid induced hyperglycemia  Normal HgbA1C 2/2020  Start AC/HS monitoring with SSNI prn    Goals of care, counseling/discussion  -Reviewed the typical clinical course of COVID19 with patient, including the potential for acute decompensation requiring intubation and mechanical  ventilation  -Discussed again as part of routine daily evaluation, patient/POA maintains code status of FULL CODE        VTE High Risk Prophylaxis: enoxaparin 40mg sq QHS @ 2100 (bundled care) if GFR >30  Dispo: DC home once medically ready.      Discharge Planning   LIZ: 11/8/2020     Code Status: Full Code   Is the patient medically ready for discharge?:     Reason for patient still in hospital (select all that apply): Patient trending condition  Discharge Plan A: Home   Discharge Delays: None known at this time      Loretta Torres MD  [facility] Medicine  [facility]  [facility] [phone]

## 2020-11-08 NOTE — PLAN OF CARE
Problem: Adult Inpatient Plan of Care  Goal: Plan of Care Review  11/7/2020 1926 by Dago Rollins RN  Outcome: Ongoing, Progressing  11/7/2020 1825 by Dago Rollins RN  Outcome: Ongoing, Progressing  Goal: Patient-Specific Goal (Individualization)  11/7/2020 1926 by Dago Rollins RN  Outcome: Ongoing, Progressing  11/7/2020 1825 by Dago Rollins RN  Outcome: Ongoing, Progressing  Goal: Absence of Hospital-Acquired Illness or Injury  11/7/2020 1926 by Dago Rollins RN  Outcome: Ongoing, Progressing  11/7/2020 1825 by Dago Rollins RN  Outcome: Ongoing, Progressing  Goal: Optimal Comfort and Wellbeing  11/7/2020 1926 by Dago Rollins RN  Outcome: Ongoing, Progressing  11/7/2020 1825 by Dago Rollins RN  Outcome: Ongoing, Progressing  Goal: Readiness for Transition of Care  11/7/2020 1926 by Dago Rollins RN  Outcome: Ongoing, Progressing  11/7/2020 1825 by Dago Rollins RN  Outcome: Ongoing, Progressing  Goal: Rounds/Family Conference  11/7/2020 1926 by Dago Rollins RN  Outcome: Ongoing, Progressing  11/7/2020 1825 by Dago Rollins RN  Outcome: Ongoing, Progressing     Problem: Infection  Goal: Infection Symptom Resolution  11/7/2020 1926 by Dago Rollins RN  Outcome: Ongoing, Progressing  11/7/2020 1825 by Dago Rollins RN  Outcome: Ongoing, Progressing     Problem: Fluid Imbalance (Pneumonia)  Goal: Fluid Balance  11/7/2020 1926 by Dago Rollins RN  Outcome: Ongoing, Progressing  11/7/2020 1825 by Dago Rollins RN  Outcome: Ongoing, Progressing     Problem: Infection (Pneumonia)  Goal: Resolution of Infection Signs/Symptoms  11/7/2020 1926 by Dago Rollins RN  Outcome: Ongoing, Progressing  11/7/2020 1825 by Dago Rollins RN  Outcome: Ongoing, Progressing     Problem: Respiratory Compromise (Pneumonia)  Goal: Effective Oxygenation and Ventilation  11/7/2020 1926 by Dago Rollins RN  Outcome: Ongoing,  Progressing  11/7/2020 1825 by Dago Rollins RN  Outcome: Ongoing, Progressing     Problem: Fall Injury Risk  Goal: Absence of Fall and Fall-Related Injury  11/7/2020 1926 by Dago Rollins RN  Outcome: Ongoing, Progressing  11/7/2020 1825 by Dago Rollins RN  Outcome: Ongoing, Progressing

## 2020-11-08 NOTE — PLAN OF CARE
Patient alert and oriented x 4. Low HR overnight. No BP issues. 3LNC sats above 92 percent. Urinal at bedside. No BM. Transfers/ambulates no issues. Call bell within reach. Side rails up x 3. Will continue to monitor.

## 2020-11-09 LAB
ALBUMIN SERPL BCP-MCNC: 2.8 G/DL (ref 3.5–5.2)
ALP SERPL-CCNC: 60 U/L (ref 55–135)
ALT SERPL W/O P-5'-P-CCNC: 59 U/L (ref 10–44)
ANION GAP SERPL CALC-SCNC: 9 MMOL/L (ref 8–16)
ANISOCYTOSIS BLD QL SMEAR: SLIGHT
AST SERPL-CCNC: 31 U/L (ref 10–40)
AV INDEX (PROSTH): 0.99
AV MEAN GRADIENT: 2 MMHG
AV PEAK GRADIENT: 4 MMHG
AV VALVE AREA: 3.56 CM2
AV VELOCITY RATIO: 0.98
BASO STIPL BLD QL SMEAR: ABNORMAL
BASOPHILS # BLD AUTO: ABNORMAL K/UL (ref 0–0.2)
BASOPHILS NFR BLD: 0 % (ref 0–1.9)
BILIRUB SERPL-MCNC: 0.4 MG/DL (ref 0.1–1)
BSA FOR ECHO PROCEDURE: 2.33 M2
BUN SERPL-MCNC: 25 MG/DL (ref 6–20)
CALCIUM SERPL-MCNC: 8.6 MG/DL (ref 8.7–10.5)
CHLORIDE SERPL-SCNC: 98 MMOL/L (ref 95–110)
CO2 SERPL-SCNC: 28 MMOL/L (ref 23–29)
CREAT SERPL-MCNC: 1 MG/DL (ref 0.5–1.4)
CV ECHO LV RWT: 0.39 CM
DIFFERENTIAL METHOD: ABNORMAL
DOP CALC AO PEAK VEL: 0.96 M/S
DOP CALC AO VTI: 19.87 CM
DOP CALC LVOT AREA: 3.6 CM2
DOP CALC LVOT DIAMETER: 2.14 CM
DOP CALC LVOT PEAK VEL: 0.94 M/S
DOP CALC LVOT STROKE VOLUME: 70.68 CM3
DOP CALCLVOT PEAK VEL VTI: 19.66 CM
E WAVE DECELERATION TIME: 163.74 MSEC
E/A RATIO: 0.87
E/E' RATIO: 8.86 M/S
ECHO LV POSTERIOR WALL: 0.87 CM (ref 0.6–1.1)
EOSINOPHIL # BLD AUTO: ABNORMAL K/UL (ref 0–0.5)
EOSINOPHIL NFR BLD: 1 % (ref 0–8)
ERYTHROCYTE [DISTWIDTH] IN BLOOD BY AUTOMATED COUNT: 12.9 % (ref 11.5–14.5)
EST. GFR  (AFRICAN AMERICAN): >60 ML/MIN/1.73 M^2
EST. GFR  (NON AFRICAN AMERICAN): >60 ML/MIN/1.73 M^2
FRACTIONAL SHORTENING: 42 % (ref 28–44)
GLUCOSE SERPL-MCNC: 194 MG/DL (ref 70–110)
HCT VFR BLD AUTO: 50.4 % (ref 40–54)
HGB BLD-MCNC: 16.9 G/DL (ref 14–18)
IMM GRANULOCYTES # BLD AUTO: ABNORMAL K/UL (ref 0–0.04)
IMM GRANULOCYTES NFR BLD AUTO: ABNORMAL % (ref 0–0.5)
INTERVENTRICULAR SEPTUM: 0.71 CM (ref 0.6–1.1)
LA MAJOR: 4.76 CM
LA MINOR: 4.31 CM
LA WIDTH: 4.06 CM
LEFT ATRIUM SIZE: 3.26 CM
LEFT ATRIUM VOLUME INDEX: 22.4 ML/M2
LEFT ATRIUM VOLUME: 50.89 CM3
LEFT INTERNAL DIMENSION IN SYSTOLE: 2.6 CM (ref 2.1–4)
LEFT VENTRICLE DIASTOLIC VOLUME INDEX: 40.81 ML/M2
LEFT VENTRICLE DIASTOLIC VOLUME: 92.68 ML
LEFT VENTRICLE MASS INDEX: 49 G/M2
LEFT VENTRICLE SYSTOLIC VOLUME INDEX: 10.9 ML/M2
LEFT VENTRICLE SYSTOLIC VOLUME: 24.72 ML
LEFT VENTRICULAR INTERNAL DIMENSION IN DIASTOLE: 4.5 CM (ref 3.5–6)
LEFT VENTRICULAR MASS: 111.78 G
LV LATERAL E/E' RATIO: 7.75 M/S
LV SEPTAL E/E' RATIO: 10.33 M/S
LYMPHOCYTES # BLD AUTO: ABNORMAL K/UL (ref 1–4.8)
LYMPHOCYTES NFR BLD: 5 % (ref 18–48)
MCH RBC QN AUTO: 31 PG (ref 27–31)
MCHC RBC AUTO-ENTMCNC: 33.5 G/DL (ref 32–36)
MCV RBC AUTO: 92 FL (ref 82–98)
MONOCYTES # BLD AUTO: ABNORMAL K/UL (ref 0.3–1)
MONOCYTES NFR BLD: 8 % (ref 4–15)
MV PEAK A VEL: 0.71 M/S
MV PEAK E VEL: 0.62 M/S
MV STENOSIS PRESSURE HALF TIME: 47.48 MS
MV VALVE AREA P 1/2 METHOD: 4.63 CM2
NEUTROPHILS NFR BLD: 84 % (ref 38–73)
NEUTS BAND NFR BLD MANUAL: 2 %
NRBC BLD-RTO: 0 /100 WBC
PLATELET # BLD AUTO: 328 K/UL (ref 150–350)
PLATELET BLD QL SMEAR: ABNORMAL
PMV BLD AUTO: 9.6 FL (ref 9.2–12.9)
POCT GLUCOSE: 121 MG/DL (ref 70–110)
POCT GLUCOSE: 128 MG/DL (ref 70–110)
POCT GLUCOSE: 230 MG/DL (ref 70–110)
POCT GLUCOSE: 242 MG/DL (ref 70–110)
POLYCHROMASIA BLD QL SMEAR: ABNORMAL
POTASSIUM SERPL-SCNC: 4.6 MMOL/L (ref 3.5–5.1)
PROT SERPL-MCNC: 6.6 G/DL (ref 6–8.4)
RA MAJOR: 4.65 CM
RA PRESSURE: 3 MMHG
RA WIDTH: 2.81 CM
RBC # BLD AUTO: 5.46 M/UL (ref 4.6–6.2)
RIGHT VENTRICULAR END-DIASTOLIC DIMENSION: 2.54 CM
RV TISSUE DOPPLER FREE WALL SYSTOLIC VELOCITY 1 (APICAL 4 CHAMBER VIEW): 13.14 CM/S
SINUS: 3.53 CM
SODIUM SERPL-SCNC: 135 MMOL/L (ref 136–145)
TDI LATERAL: 0.08 M/S
TDI SEPTAL: 0.06 M/S
TDI: 0.07 M/S
TRICUSPID ANNULAR PLANE SYSTOLIC EXCURSION: 2.24 CM
WBC # BLD AUTO: 18.01 K/UL (ref 3.9–12.7)

## 2020-11-09 PROCEDURE — 94799 UNLISTED PULMONARY SVC/PX: CPT

## 2020-11-09 PROCEDURE — 27000221 HC OXYGEN, UP TO 24 HOURS

## 2020-11-09 PROCEDURE — 80053 COMPREHEN METABOLIC PANEL: CPT

## 2020-11-09 PROCEDURE — 94640 AIRWAY INHALATION TREATMENT: CPT

## 2020-11-09 PROCEDURE — 85007 BL SMEAR W/DIFF WBC COUNT: CPT

## 2020-11-09 PROCEDURE — 94664 DEMO&/EVAL PT USE INHALER: CPT

## 2020-11-09 PROCEDURE — 25000003 PHARM REV CODE 250: Performed by: INTERNAL MEDICINE

## 2020-11-09 PROCEDURE — 99900035 HC TECH TIME PER 15 MIN (STAT)

## 2020-11-09 PROCEDURE — 99232 SBSQ HOSP IP/OBS MODERATE 35: CPT | Mod: ,,, | Performed by: INTERNAL MEDICINE

## 2020-11-09 PROCEDURE — 25000242 PHARM REV CODE 250 ALT 637 W/ HCPCS: Performed by: INTERNAL MEDICINE

## 2020-11-09 PROCEDURE — 63600175 PHARM REV CODE 636 W HCPCS: Performed by: INTERNAL MEDICINE

## 2020-11-09 PROCEDURE — 85027 COMPLETE CBC AUTOMATED: CPT

## 2020-11-09 PROCEDURE — 99232 PR SUBSEQUENT HOSPITAL CARE,LEVL II: ICD-10-PCS | Mod: ,,, | Performed by: INTERNAL MEDICINE

## 2020-11-09 PROCEDURE — 20600001 HC STEP DOWN PRIVATE ROOM

## 2020-11-09 PROCEDURE — 94761 N-INVAS EAR/PLS OXIMETRY MLT: CPT

## 2020-11-09 PROCEDURE — 36415 COLL VENOUS BLD VENIPUNCTURE: CPT

## 2020-11-09 RX ORDER — IPRATROPIUM BROMIDE AND ALBUTEROL SULFATE 2.5; .5 MG/3ML; MG/3ML
3 SOLUTION RESPIRATORY (INHALATION)
Status: DISCONTINUED | OUTPATIENT
Start: 2020-11-10 | End: 2020-11-10

## 2020-11-09 RX ADMIN — HYDROXYZINE PAMOATE 50 MG: 25 CAPSULE ORAL at 09:11

## 2020-11-09 RX ADMIN — HYDRALAZINE HYDROCHLORIDE 25 MG: 25 TABLET, FILM COATED ORAL at 08:11

## 2020-11-09 RX ADMIN — IPRATROPIUM BROMIDE AND ALBUTEROL SULFATE 3 ML: .5; 2.5 SOLUTION RESPIRATORY (INHALATION) at 07:11

## 2020-11-09 RX ADMIN — AMLODIPINE BESYLATE 5 MG: 5 TABLET ORAL at 08:11

## 2020-11-09 RX ADMIN — Medication 1000 UNITS: at 08:11

## 2020-11-09 RX ADMIN — HYDRALAZINE HYDROCHLORIDE 25 MG: 25 TABLET, FILM COATED ORAL at 03:11

## 2020-11-09 RX ADMIN — Medication 500 MG: at 09:11

## 2020-11-09 RX ADMIN — NEBIVOLOL HYDROCHLORIDE 2.5 MG: 2.5 TABLET ORAL at 08:11

## 2020-11-09 RX ADMIN — IPRATROPIUM BROMIDE AND ALBUTEROL SULFATE 3 ML: .5; 2.5 SOLUTION RESPIRATORY (INHALATION) at 01:11

## 2020-11-09 RX ADMIN — DOCUSATE SODIUM 50MG AND SENNOSIDES 8.6MG 1 TABLET: 8.6; 5 TABLET, FILM COATED ORAL at 08:11

## 2020-11-09 RX ADMIN — INSULIN ASPART 2 UNITS: 100 INJECTION, SOLUTION INTRAVENOUS; SUBCUTANEOUS at 03:11

## 2020-11-09 RX ADMIN — AMLODIPINE BESYLATE 5 MG: 5 TABLET ORAL at 09:11

## 2020-11-09 RX ADMIN — THERA TABS 1 TABLET: TAB at 08:11

## 2020-11-09 RX ADMIN — Medication 500 MG: at 08:11

## 2020-11-09 RX ADMIN — INSULIN ASPART 1 UNITS: 100 INJECTION, SOLUTION INTRAVENOUS; SUBCUTANEOUS at 09:11

## 2020-11-09 RX ADMIN — ENOXAPARIN SODIUM 40 MG: 40 INJECTION SUBCUTANEOUS at 09:11

## 2020-11-09 RX ADMIN — IPRATROPIUM BROMIDE AND ALBUTEROL SULFATE 3 ML: .5; 2.5 SOLUTION RESPIRATORY (INHALATION) at 12:11

## 2020-11-09 RX ADMIN — IPRATROPIUM BROMIDE AND ALBUTEROL SULFATE 3 ML: .5; 2.5 SOLUTION RESPIRATORY (INHALATION) at 08:11

## 2020-11-09 RX ADMIN — DEXAMETHASONE 6 MG: 4 TABLET ORAL at 08:11

## 2020-11-09 NOTE — PLAN OF CARE
Patient oob to chair, lungs diminished this morning, slight crackles this afternoon.  Patient received lasix 40 mg IVP once this afternoon.  Patient anxious over regarding his care, vs BP, sats.  Discussed the inportance of using the IS, the importance of his BP med to take on a scheduled basis.   Both patient and wife anxious about outcomes.  Asked MD to call patient wife.  Patient states MD called his wife.        Problem: Adult Inpatient Plan of Care  Goal: Plan of Care Review  Outcome: Ongoing, Progressing  Goal: Patient-Specific Goal (Individualization)  Outcome: Ongoing, Progressing  Goal: Absence of Hospital-Acquired Illness or Injury  Outcome: Ongoing, Progressing  Goal: Optimal Comfort and Wellbeing  Outcome: Ongoing, Progressing  Goal: Readiness for Transition of Care  Outcome: Ongoing, Progressing  Goal: Rounds/Family Conference  Outcome: Ongoing, Progressing     Problem: Infection  Goal: Infection Symptom Resolution  Outcome: Ongoing, Progressing     Problem: Fluid Imbalance (Pneumonia)  Goal: Fluid Balance  Outcome: Ongoing, Progressing     Problem: Infection (Pneumonia)  Goal: Resolution of Infection Signs/Symptoms  Outcome: Ongoing, Progressing     Problem: Respiratory Compromise (Pneumonia)  Goal: Effective Oxygenation and Ventilation  Outcome: Ongoing, Progressing     Problem: Fall Injury Risk  Goal: Absence of Fall and Fall-Related Injury  Outcome: Ongoing, Progressing

## 2020-11-09 NOTE — PROGRESS NOTES
Hospital Medicine  Progress Note  [facility]      Patient Name: Derek Joseph Weil  MRN:  [id number]  Hospital Medicine Team: [facility] G Chino Fields MD  Date of Admission:  11/2/2020     Length of Stay:  LOS: 4 days       Principal Problem:  Pneumonia of both lower lobes due to infectious organism      HPI:  Mr Derek Joseph Weil is a 54 y.o. male with hx of hypertension presenting with [facility] with progressively worsening dry cough with associated fevers, chills, malaise, weakness, insomnia, dyspnea (dalia on exertion), chest congestion, decreased po intake, intermittent diarrhea since 10/28 (6 day hx). He stated that he was otherwise well before his symptoms. He denied any dizziness, syncope, trauma, pleurisy, N/V, bleeds. States these symptoms were similar to his piror episode of PNA 1 yr ago. His wife is diagnosed with COVID and has been symptomatic for the past week, now admitted to hospital. With concerns for COVID PNA, he presenting to the ED. Nonsmoker. No alcohol use or illicit drug use.     In the ED, patient was febrile  Tm 102.4, normotensive, saturating well on RA. Labs normal for normal wbc, COVID+, Na 132, .4, Ferritin 789, , . CXR with multifocal PNA.      Admitted to hospital medicine for COVID PNA. He was started on supportive tx with inhalers, steroids, abx and oxygen PRN. Gradually improving however continues to have chills, weakness and dyspnea on exertion. Continuing to monitoring him at this time.    Hospital Course:  Management of acute hypoxic respiratory failure due to COVID-19 pneumonia     Interval History:     Patient lying in bed, mild respiratory distress. Reports worsening SOB and cough. Denies fever, chills, chest pain, N/V, abdominal pain, diarrhea or swelling.       Review of Systems:  Constitutional: Positive for fatigue, poor appetite. Negative for fever, chills  HENT: Negative for sore throat, negative for trouble swallowing.    Eyes:  Negative for photophobia, visual disturbance.   Respiratory: Positive for cough, shortness of breath, READ, chest congestion  Cardiovascular: no chest pain or palpitations  Cardiovascular: Negative for chest pain, palpitations, leg swelling.   Gastrointestinal: Negative for diarrhea , appetite improving, Negative for abdominal pain, constipation, nausea, vomiting.   Endocrine: Negative for cold intolerance, heat intolerance.   Genitourinary: Negative for dysuria, frequency.   Musculoskeletal: Negative for arthralgias, myalgias.   Skin: Negative for rash  Neurological: Negative for dizziness, syncope, light-headedness.   Psychiatric/Behavioral: Negative for confusion, hallucinations, anxiety      Inpatient Medications:    Current Facility-Administered Medications:     acetaminophen tablet 650 mg, 650 mg, Oral, Q4H PRN, Сергей Sen MD, 650 mg at 11/05/20 2141    albuterol-ipratropium 2.5 mg-0.5 mg/3 mL nebulizer solution 3 mL, 3 mL, Nebulization, Q6H, Сергей Sen MD, 3 mL at 11/09/20 0736    aluminum & magnesium hydroxide-simethicone 400-400-40 mg/5 mL suspension 30 mL, 30 mL, Oral, Q4H PRN, Сергей Sen MD    amLODIPine tablet 5 mg, 5 mg, Oral, BID, Loretta Torres MD, 5 mg at 11/09/20 0827    ascorbic acid (vitamin C) tablet 500 mg, 500 mg, Oral, BID, Сергей Sen MD, 500 mg at 11/09/20 0827    benzonatate capsule 100 mg, 100 mg, Oral, TID PRN, Сергей Sen MD, 100 mg at 11/04/20 2224    dexAMETHasone tablet 6 mg, 6 mg, Oral, Daily, Сергей Sen MD, 6 mg at 11/09/20 0827    dextrose 50% injection 12.5 g, 12.5 g, Intravenous, PRN, Сергей Sen MD    dextrose 50% injection 25 g, 25 g, Intravenous, PRN, Сергей Sen MD    enoxaparin injection 40 mg, 40 mg, Subcutaneous, Q24H, Сергей Sen MD, 40 mg at 11/08/20 2101    glucagon (human recombinant) injection 1 mg, 1 mg, Intramuscular, PRN, Сергей Sen MD    glucose chewable tablet 16 g, 16 g, Oral, PRN, Сергей Sen MD     glucose chewable tablet 24 g, 24 g, Oral, PRN, Сергей Sen MD    hydrALAZINE tablet 25 mg, 25 mg, Oral, Q6H PRN, Loretta Torres MD, 25 mg at 11/09/20 0827    hydrOXYzine pamoate capsule 50 mg, 50 mg, Oral, Nightly PRN, Сергей Sen MD    influenza (QUADRIVALENT PF) vaccine 0.5 mL, 0.5 mL, Intramuscular, vaccine x 1 dose, Сергей Sen MD    insulin aspart U-100 pen 0-5 Units, 0-5 Units, Subcutaneous, QID (AC + HS) PRN, Loretta Torres MD    lidocaine 5 % patch 2 patch, 2 patch, Transdermal, Q24H, Loretta Torres MD    loperamide capsule 2 mg, 2 mg, Oral, QID PRN, Сергей Sen MD    melatonin tablet 6 mg, 6 mg, Oral, Nightly PRN, Сергей Sen MD, 6 mg at 11/08/20 2100    multivitamin tablet, 1 tablet, Oral, Daily, Сергей Sen MD, 1 tablet at 11/09/20 0827    nebivoloL tablet 2.5 mg, 2.5 mg, Oral, Daily, Сергей Sen MD, 2.5 mg at 11/09/20 0826    ondansetron injection 4 mg, 4 mg, Intravenous, Q8H PRN, Сергей Sen MD    polyethylene glycol packet 17 g, 17 g, Oral, BID PRN, Сергей Sen MD, 17 g at 11/07/20 0855    promethazine (PHENERGAN) 6.25 mg in dextrose 5 % 50 mL IVPB, 6.25 mg, Intravenous, Q6H PRN, Сергей Sen MD    promethazine-codeine 6.25-10 mg/5 ml syrup 10 mL, 10 mL, Oral, Q4H PRN, Arjuhi Arguelles DO, 10 mL at 11/04/20 2220    senna-docusate 8.6-50 mg per tablet 1 tablet, 1 tablet, Oral, BID, Loretta Torres MD, 1 tablet at 11/09/20 0827    sodium chloride 0.9% flush 10 mL, 10 mL, Intravenous, PRN, Сергей Sen MD    vitamin D 1000 units tablet 1,000 Units, 1,000 Units, Oral, Daily, Сергей Sen MD, 1,000 Units at 11/09/20 0827      Physical Exam:      Intake/Output Summary (Last 24 hours) at 11/9/2020 1255  Last data filed at 11/9/2020 0745  Gross per 24 hour   Intake 720 ml   Output 900 ml   Net -180 ml     Wt Readings from Last 3 Encounters:   11/09/20 108 kg (238 lb 1.6 oz)   02/20/17 112.4 kg (247 lb 11 oz)   08/25/16 113.4 kg (250 lb)       BP (!)  "143/83 (BP Location: Left arm, Patient Position: Lying)   Pulse 66   Temp 97.8 °F (36.6 °C) (Oral)   Resp (!) 22   Ht 5' 11" (1.803 m)   Wt 108 kg (238 lb 1.6 oz)   SpO2 (!) 94%   BMI 33.21 kg/m²     GEN: NAD, conversant  Resp: decreased breath sounds at bilateral bases, no wheezes or rales, normal work of breathing   CV: RRR, no m/r/g, no edema  GI: soft, NTND  Skin: no rash  Neuro: AAOx3, CN grossly intact, no focal neurologic deficits    Laboratory:  Lab Results   Component Value Date    [id number] Positive (A) 11/02/2020       Recent Labs   Lab 11/06/20  0323 11/07/20  0304 11/09/20  0247   WBC 11.51 11.59 18.01*   LYMPH 6.6*  0.8* 7.1*  0.8* 5.0*  CANCELED   HGB 14.9 15.6 16.9   HCT 45.3 46.8 50.4    266 328     Recent Labs   Lab 11/05/20  0519 11/06/20  0323 11/07/20  0304 11/09/20  0247    139 135* 135*   K 4.5 4.6 4.7 4.6   CL 99 102 100 98   CO2 29 26 24 28   BUN 22* 20 21* 25*   CREATININE 1.1 1.0 0.9 1.0   * 175* 168* 194*   CALCIUM 9.0 8.5* 8.8 8.6*   MG 2.4 2.4 2.2  --    PHOS 4.5 4.5 4.5  --      Recent Labs   Lab 11/03/20  1423  11/06/20  0323 11/07/20  0304 11/09/20  0247   ALKPHOS  --    < > 54* 53* 60   ALT  --    < > 23 31 59*   AST  --    < > 24 26 31   ALBUMIN  --    < > 2.7* 2.7* 2.8*   PROT  --    < > 6.5 6.7 6.6   BILITOT  --    < > 0.3 0.4 0.4   INR 1.0  --   --   --   --     < > = values in this interval not displayed.        Recent Labs     11/08/20  0309 11/08/20  0310   DDIMER  --  0.41   FERRITIN 658*  --    CRP 20.0*  --    *  --        All labs within the last 24 hours were reviewed.     Microbiology:  Microbiology Results (last 7 days)     Procedure Component Value Units Date/Time    Blood culture x two cultures. Draw prior to antibiotics. [[id number]] Collected: 11/02/20 1309    Order Status: Completed Specimen: Blood from Peripheral, Hand, Left Updated: 11/07/20 1412     Blood Culture, Routine No growth after 5 days.    Narrative:      Aerobic " and anaerobic    Blood culture x two cultures. Draw prior to antibiotics. [[id number]] Collected: 11/02/20 1308    Order Status: Completed Specimen: Blood from Peripheral, Hand, Right Updated: 11/07/20 1412     Blood Culture, Routine No growth after 5 days.    Narrative:      Aerobic and anaerobic    Culture, Respiratory with Gram Stain [[id number]]     Order Status: No result Specimen: Sputum, Expectorated             Imaging  ECG Results          EKG 12-lead (Final result)  Result time 11/03/20 13:21:15    Final result by Interface, Lab In [facility] (11/03/20 13:21:15)                 Narrative:    Test Reason : Z20.828,    Vent. Rate : 076 BPM     Atrial Rate : 076 BPM     P-R Int : 126 ms          QRS Dur : 096 ms      QT Int : 392 ms       P-R-T Axes : 073 054 068 degrees     QTc Int : 441 ms    Normal sinus rhythm  Normal ECG  When compared with ECG of 25-AUG-2016 19:39,  No significant change was found  Confirmed by Alfredo Alejandro MD ([id number]) on 11/3/2020 6:18:06 AM    Referred By: DOUGLAS   SELF           Confirmed By:Alfredo Alejandro MD                              No results found for this or any previous visit.    X-Ray Chest AP Portable  Narrative: EXAMINATION:  XR CHEST AP PORTABLE    CLINICAL HISTORY:  chills - increased cough;    TECHNIQUE:  Single frontal view of the chest was performed.    COMPARISON:  11/2/20    FINDINGS:  There is diffuse bilateral interstitial prominence with patchy bibasilar alveolar opacities, increased from prior study.  There is a small right pleural effusion.  Cardiac silhouette is stable.  No pneumothorax.  Impression: As above.    Electronically signed by: Ace Doss MD  Date:    11/08/2020  Time:    13:33      All imaging within the last 24 hours was reviewed.     Assessment and Plan:    Active Hospital Problems    Diagnosis  POA    *Pneumonia of both lower lobes due to infectious organism [J18.9]  Yes    Pneumonia due to COVID-19 virus [U07.1, J12.89]  Yes     Hypoxia [R09.02]  Yes    MARISA on CPAP [G47.33, Z99.89]  Not Applicable      Resolved Hospital Problems   No resolved problems to display.     Scheduled Meds:   albuterol-ipratropium  3 mL Nebulization Q6H    amLODIPine  5 mg Oral BID    ascorbic acid (vitamin C)  500 mg Oral BID    dexAMETHasone  6 mg Oral Daily    enoxaparin  40 mg Subcutaneous Q24H    lidocaine  2 patch Transdermal Q24H    multivitamin  1 tablet Oral Daily    nebivoloL  2.5 mg Oral Daily    senna-docusate 8.6-50 mg  1 tablet Oral BID    vitamin D  1,000 Units Oral Daily     Continuous Infusions:  PRN Meds:.acetaminophen, aluminum & magnesium hydroxide-simethicone, benzonatate, dextrose 50%, dextrose 50%, glucagon (human recombinant), glucose, glucose, hydrALAZINE, hydrOXYzine pamoate, influenza, insulin aspart U-100, loperamide, melatonin, ondansetron, polyethylene glycol, promethazine (PHENERGAN) IVPB, promethazine-codeine 6.25-10 mg/5 ml, sodium chloride 0.9%      Suspected COVID-19 Virus Infection  Viral Pneumonia due to COVID-19  - COVID-19 testing:   - Isolation: Airborne/Droplet. Surgical mask on patient. Notify Infection Control  - Diagnostics: Trend Q48hrs if stable, more frequently if patient decompensating     - Management: per [facility] COVID Treatment Protocol (4/15/20)    - Monitoring:   - Telemetry & Continuous Pulse Oximetry    - Nutrition:    - Multivitamin PO daily   - Add Boost supplement   - Vitamin D 1000IU daily if deficient   - Ascorbic acid 500mg PO bid    - Supportive Care:   - acetaminophen 650mg PO Q6hr PRN fever/headache   - loperamide PRN viral diarrhea   - IVF if indicated, restrictive strategy preferred, no maintenance IV if able   - VTE PPx: enoxaparin or heparin SQ unless contraindicated    - Antibiotics:  - if indications, CXR findings, elevated procal. See protocol for alternatives.   - Discontinue early if low concern for bacterial co-infection   - ceftriaxone 1g Q24h x 5 days  - azithromycin 500mg po x1,  "then 250mg po daily x 4 days    - Investigational Therapies:   - If patient meets criteria    Acute Hypoxemic Respiratory Failure  - Order RT consult via Respiratory Communication for COVID Protocols  - Order Incentive Spirometer Q4h  - Order Flutter Valve Q4h  - Continuous Pulse Oximetry  - Goal SpO2 92-96%  - Supplemental O2 via LFNC, VentiMask, or HFNC (see Respiratory Support Oxygen Therapies)  - If wheezing, albuterol INH Q6h scheduled & PRN  - Proning Protocol if patient is a candidate (see  Proning Protocol)   - GCS >13, able to self-prone  - If deterioration, may warrant trial of NIPPV and transfer to [facility] pressure room or immediate ICU consult       Hypertension   - resumed home meds (amlodipine 5 mg and nebivolol 2.5 mg daily)  - increase amlodipine to BID with hydralazine prn     MARISA on CPAP   - continue CPAP use nightly     Steroid induced hyperglycemia  Normal HgbA1C 2/2020  Start AC/HS monitoring with SSNI prn       Advance Care Planning  Goals of care, counseling/discussion: Full code   Advance Care Planning     If patient transitions to Comfort-Focused Care, please place "Nurse Communication: End of Life Care, family members allowed to visit, including spouse/partner and adult children [please list names]. Please ask family to visit as a group and leave as a group.       VTE High Risk Prophylaxis:   VTE Risk Mitigation (From admission, onward)         Ordered     enoxaparin injection 40 mg  Every 24 hours      11/02/20 1536     IP VTE HIGH RISK PATIENT  Once      11/02/20 1536     Place sequential compression device  Until discontinued      11/02/20 1536                  Subsequent Inpatient Hospital Care    Level 3 [id number] Total visit time was 35 minutes or greater with greater than 50% of time spent in counseling and coordination of care.         High Risk Conditions:  Patient has a condition that poses threat to life and bodily function: Severe Respiratory Distress      Chino Fields, " MD  [facility] Medicine Staff  Pager: [phone]; Spectra: [phone]

## 2020-11-09 NOTE — PLAN OF CARE
Patient alert and oriented. On CPAP currently. 's/160's systolic. SB on the monitor. Voided to urinal. BM overnight. Blood sugars in the 260's refused insulin. Ambulatory. Answered all questions. Call bell in reach. Side rails up x 3. Will continue to monitor

## 2020-11-10 LAB
ALBUMIN SERPL BCP-MCNC: 2.8 G/DL (ref 3.5–5.2)
ALP SERPL-CCNC: 52 U/L (ref 55–135)
ALT SERPL W/O P-5'-P-CCNC: 62 U/L (ref 10–44)
ANION GAP SERPL CALC-SCNC: 9 MMOL/L (ref 8–16)
ANISOCYTOSIS BLD QL SMEAR: SLIGHT
AST SERPL-CCNC: 29 U/L (ref 10–40)
BASOPHILS # BLD AUTO: ABNORMAL K/UL (ref 0–0.2)
BASOPHILS NFR BLD: 0 % (ref 0–1.9)
BILIRUB SERPL-MCNC: 0.6 MG/DL (ref 0.1–1)
BUN SERPL-MCNC: 24 MG/DL (ref 6–20)
BURR CELLS BLD QL SMEAR: ABNORMAL
CALCIUM SERPL-MCNC: 8.5 MG/DL (ref 8.7–10.5)
CHLORIDE SERPL-SCNC: 100 MMOL/L (ref 95–110)
CO2 SERPL-SCNC: 27 MMOL/L (ref 23–29)
CREAT SERPL-MCNC: 0.8 MG/DL (ref 0.5–1.4)
CRP SERPL-MCNC: 7.8 MG/L (ref 0–8.2)
D DIMER PPP IA.FEU-MCNC: 0.35 MG/L FEU
DIFFERENTIAL METHOD: ABNORMAL
EOSINOPHIL # BLD AUTO: ABNORMAL K/UL (ref 0–0.5)
EOSINOPHIL NFR BLD: 0 % (ref 0–8)
ERYTHROCYTE [DISTWIDTH] IN BLOOD BY AUTOMATED COUNT: 13 % (ref 11.5–14.5)
EST. GFR  (AFRICAN AMERICAN): >60 ML/MIN/1.73 M^2
EST. GFR  (NON AFRICAN AMERICAN): >60 ML/MIN/1.73 M^2
FERRITIN SERPL-MCNC: 537 NG/ML (ref 20–300)
GLUCOSE SERPL-MCNC: 126 MG/DL (ref 70–110)
HCT VFR BLD AUTO: 49.1 % (ref 40–54)
HGB BLD-MCNC: 16.6 G/DL (ref 14–18)
HYPOCHROMIA BLD QL SMEAR: ABNORMAL
IMM GRANULOCYTES # BLD AUTO: ABNORMAL K/UL (ref 0–0.04)
IMM GRANULOCYTES NFR BLD AUTO: ABNORMAL % (ref 0–0.5)
LDH SERPL L TO P-CCNC: 426 U/L (ref 110–260)
LYMPHOCYTES # BLD AUTO: ABNORMAL K/UL (ref 1–4.8)
LYMPHOCYTES NFR BLD: 12 % (ref 18–48)
MCH RBC QN AUTO: 31.3 PG (ref 27–31)
MCHC RBC AUTO-ENTMCNC: 33.8 G/DL (ref 32–36)
MCV RBC AUTO: 93 FL (ref 82–98)
METAMYELOCYTES NFR BLD MANUAL: 1 %
MONOCYTES # BLD AUTO: ABNORMAL K/UL (ref 0.3–1)
MONOCYTES NFR BLD: 4 % (ref 4–15)
MYELOCYTES NFR BLD MANUAL: 1 %
NEUTROPHILS NFR BLD: 82 % (ref 38–73)
NRBC BLD-RTO: 0 /100 WBC
OVALOCYTES BLD QL SMEAR: ABNORMAL
PLATELET # BLD AUTO: 331 K/UL (ref 150–350)
PMV BLD AUTO: 9.8 FL (ref 9.2–12.9)
POCT GLUCOSE: 129 MG/DL (ref 70–110)
POCT GLUCOSE: 139 MG/DL (ref 70–110)
POCT GLUCOSE: 192 MG/DL (ref 70–110)
POCT GLUCOSE: 258 MG/DL (ref 70–110)
POIKILOCYTOSIS BLD QL SMEAR: SLIGHT
POLYCHROMASIA BLD QL SMEAR: ABNORMAL
POTASSIUM SERPL-SCNC: 4.5 MMOL/L (ref 3.5–5.1)
PROT SERPL-MCNC: 6.4 G/DL (ref 6–8.4)
RBC # BLD AUTO: 5.3 M/UL (ref 4.6–6.2)
SODIUM SERPL-SCNC: 136 MMOL/L (ref 136–145)
WBC # BLD AUTO: 20.48 K/UL (ref 3.9–12.7)

## 2020-11-10 PROCEDURE — 80053 COMPREHEN METABOLIC PANEL: CPT

## 2020-11-10 PROCEDURE — 85007 BL SMEAR W/DIFF WBC COUNT: CPT

## 2020-11-10 PROCEDURE — 85027 COMPLETE CBC AUTOMATED: CPT

## 2020-11-10 PROCEDURE — 63600175 PHARM REV CODE 636 W HCPCS: Performed by: INTERNAL MEDICINE

## 2020-11-10 PROCEDURE — 99231 SBSQ HOSP IP/OBS SF/LOW 25: CPT | Mod: ,,, | Performed by: INTERNAL MEDICINE

## 2020-11-10 PROCEDURE — 82728 ASSAY OF FERRITIN: CPT

## 2020-11-10 PROCEDURE — 94640 AIRWAY INHALATION TREATMENT: CPT

## 2020-11-10 PROCEDURE — 94664 DEMO&/EVAL PT USE INHALER: CPT

## 2020-11-10 PROCEDURE — 83615 LACTATE (LD) (LDH) ENZYME: CPT

## 2020-11-10 PROCEDURE — 20600001 HC STEP DOWN PRIVATE ROOM

## 2020-11-10 PROCEDURE — 94761 N-INVAS EAR/PLS OXIMETRY MLT: CPT

## 2020-11-10 PROCEDURE — 94799 UNLISTED PULMONARY SVC/PX: CPT

## 2020-11-10 PROCEDURE — 86140 C-REACTIVE PROTEIN: CPT

## 2020-11-10 PROCEDURE — 36415 COLL VENOUS BLD VENIPUNCTURE: CPT

## 2020-11-10 PROCEDURE — 25000003 PHARM REV CODE 250: Performed by: INTERNAL MEDICINE

## 2020-11-10 PROCEDURE — 25000242 PHARM REV CODE 250 ALT 637 W/ HCPCS: Performed by: INTERNAL MEDICINE

## 2020-11-10 PROCEDURE — 94660 CPAP INITIATION&MGMT: CPT

## 2020-11-10 PROCEDURE — 99231 PR SUBSEQUENT HOSPITAL CARE,LEVL I: ICD-10-PCS | Mod: ,,, | Performed by: INTERNAL MEDICINE

## 2020-11-10 PROCEDURE — 99900035 HC TECH TIME PER 15 MIN (STAT)

## 2020-11-10 PROCEDURE — 85379 FIBRIN DEGRADATION QUANT: CPT

## 2020-11-10 RX ORDER — IPRATROPIUM BROMIDE AND ALBUTEROL SULFATE 2.5; .5 MG/3ML; MG/3ML
3 SOLUTION RESPIRATORY (INHALATION)
Status: DISCONTINUED | OUTPATIENT
Start: 2020-11-10 | End: 2020-11-11 | Stop reason: HOSPADM

## 2020-11-10 RX ORDER — FUROSEMIDE 10 MG/ML
40 INJECTION INTRAMUSCULAR; INTRAVENOUS ONCE
Status: COMPLETED | OUTPATIENT
Start: 2020-11-10 | End: 2020-11-10

## 2020-11-10 RX ADMIN — NEBIVOLOL HYDROCHLORIDE 2.5 MG: 2.5 TABLET ORAL at 08:11

## 2020-11-10 RX ADMIN — AMLODIPINE BESYLATE 5 MG: 5 TABLET ORAL at 09:11

## 2020-11-10 RX ADMIN — AMLODIPINE BESYLATE 5 MG: 5 TABLET ORAL at 08:11

## 2020-11-10 RX ADMIN — IPRATROPIUM BROMIDE AND ALBUTEROL SULFATE 3 ML: .5; 2.5 SOLUTION RESPIRATORY (INHALATION) at 07:11

## 2020-11-10 RX ADMIN — THERA TABS 1 TABLET: TAB at 08:11

## 2020-11-10 RX ADMIN — Medication 1000 UNITS: at 08:11

## 2020-11-10 RX ADMIN — Medication 500 MG: at 08:11

## 2020-11-10 RX ADMIN — ENOXAPARIN SODIUM 40 MG: 40 INJECTION SUBCUTANEOUS at 09:11

## 2020-11-10 RX ADMIN — IPRATROPIUM BROMIDE AND ALBUTEROL SULFATE 3 ML: .5; 2.5 SOLUTION RESPIRATORY (INHALATION) at 02:11

## 2020-11-10 RX ADMIN — Medication 500 MG: at 09:11

## 2020-11-10 RX ADMIN — DOCUSATE SODIUM 50MG AND SENNOSIDES 8.6MG 1 TABLET: 8.6; 5 TABLET, FILM COATED ORAL at 09:11

## 2020-11-10 RX ADMIN — FUROSEMIDE 40 MG: 10 INJECTION, SOLUTION INTRAMUSCULAR; INTRAVENOUS at 11:11

## 2020-11-10 RX ADMIN — DEXAMETHASONE 6 MG: 4 TABLET ORAL at 08:11

## 2020-11-10 RX ADMIN — IPRATROPIUM BROMIDE AND ALBUTEROL SULFATE 3 ML: .5; 2.5 SOLUTION RESPIRATORY (INHALATION) at 08:11

## 2020-11-10 RX ADMIN — INSULIN ASPART 3 UNITS: 100 INJECTION, SOLUTION INTRAVENOUS; SUBCUTANEOUS at 04:11

## 2020-11-10 RX ADMIN — DOCUSATE SODIUM 50MG AND SENNOSIDES 8.6MG 1 TABLET: 8.6; 5 TABLET, FILM COATED ORAL at 08:11

## 2020-11-10 NOTE — PLAN OF CARE
Patient alert and oriented x 4. On CPAP. No BP or HR issues. Urinal in reach. Side rails up x 3. Call bell within reach. Will continue to monitor

## 2020-11-10 NOTE — PHYSICIAN QUERY
PT Name: Derek Joseph Weil  MR #: [id number]     Documentation Clarification      CDS/: Iris Sanchez RN CDI    Contact information: [email or web address]    This form is a permanent document in the medical record.     Query Date: November 10, 2020    By submitting this query, we are merely seeking further clarification of documentation. Please utilize your independent clinical judgment when addressing the question(s) below.    The Medical Record reflects the following:    Clinical Findings Location in Medical Records   presents to the ED with a complaint of 3 days of fever, cough, shortness of breath. He feels extremely weak. Breath sounds normal. No respiratory distress. He has no wheezes. He has no rhonchi. He has no rales.    Viral Pneumonia due to COVID-19  presenting with [facility] with progressively worsening dry cough with associated fevers, chills, malaise, weakness, insomnia, dyspnea (dalia on exertion), chest congestion, decreased po intake, intermittent diarrhea since 10/28 (6 day hx).   In the ED, patient was febrile  Tm 102.4, normotensive, saturating well on RA  Respiratory: dry cough, shortness of breath, READ, chest congestion  Viral Pneumonia due to COVID-19  Acute Respiratory Distress  -Not hypoxic at this time on rest or ambulation      MARISA on CPAP   - continue CPAP use nightly    Admitted to hospital medicine for COVID PNA. He was started on supportive tx with inhalers, steroids, abx and oxygen PRN. Gradually improving however continues to have chills, weakness and dyspnea on exertion.  he does notice that he is lightheaded when he does not have the oxygen in place; no dysuria; CXR done with increased infiltrates; cough seems increased and problematic at night. No accessory muscle usage or respiratory distress.  Hypoxia  -+ hypoxic at this time with ambulation on 6 min walk test and anticipate patient will need home oxygen on discharge  -continue supplemental oxygen currently at 2 L/min; lasix 20 mg  "IV X 1 on ; monitor closely for worsening of respiratory status  -continue CPAP nightly; repeat lasix on     Patient lying in bed, mild respiratory distress. Reports worsening SOB and cough.  Management of acute hypoxic respiratory failure due to COVID-19 pneumonia     ER MD Note    ASHELY Kang MD      H&P  MARIPOSA Sen MD                     H Medicines ASHELY Torres MD    H Medicine    E Melissa BAPTISTE                        H Medicine   S Donnie BAPTISTE              O2 Sat    RR          O2      97            20           Room Air      93            20           1 L NC        CPAP at night      90            20           1 L NC        CPAP at night      96            14           3 L NC        CPAP at night      95            18           2 L NC        CPAP at night  11/10  93            20           Room Air      Home Oxygen Evaluation  Patient's Home O2 Sat on room air, while at rest: 91%  Patient's O2 Sat on room air while exercisin%  Patient's O2 Sat while exercising on O2: 90 at 3 LPM   Vitals                RN Note  10:56 AM  WILLEM Tomas RN     Acute Respiratory Failure with Hypoxia - ABG pO2 < 60 mmHg or O2 sat of 88% on RA and respiratory symptoms documented.    Acute Respiratory Distress - Generally describes less severe respiratory symptoms (tachypnea, in respiratory distress, increased work of breathing,   unable to speak in complete sentences, labored breathing, use of accessory muscles, RR> 24, cyanosis, dyspnea, wheezing, stridor,   lethargy) without sufficient measurements (pO2, SpO2, pH, and pCO2) to meet criteria for respiratory failure.    Acute Respiratory Insufficiency - Generally describes less severe respiratory symptoms and measurements (pO2, SpO2, pH, and pCO2)   NOT meeting criteria for respiratory failure.                                                                                Provider, please clarify the diagnosis of "Acute hypoxic " "respiratory failure" :      [   ] Acute hypoxic respiratory failure is confirmed           and additional clinical support/decision-making indicators for the           diagnosis include (please specify):__required supplemental oxygen. respiratory failure now resolved ______________.      Present on admit ( X ) Yes  (  ) No  (  ) Undetermined.     [   ] Acute hypoxic respiratory failure is not confirmed and/or it has been ruled out.      Present on admit (  ) Yes  (  ) No  (  ) Undetermined.     [   ] Acute hypoxic respiratory failure is not confirmed and/or it has been ruled out,      (  ) acute respiratory distress and hypoxia ruled in.     (  ) acute respiratory insufficiency and hypoxia ruled in.     (  ) other diagnosis ruled in (please specify):_______________.      Present on admit (  ) Yes  (  ) No  (  ) Undetermined.     [   ] Other clarification (please specify): ___________________.      Present on admit (  ) Yes  (  ) No  (  ) Undetermined.     [  ] Clinically undetermined       "

## 2020-11-10 NOTE — PLAN OF CARE
Currently not stable for discharge - Still has some SOB but much improved. He will get a dose of lasix today and will finish his dexamethasone tomorrow. Will complete 6 minute walk test prior to discharge. Will continue to follow    Inga Mccracken RN  Case  Management  Ext [phone]       11/10/20 8043   Discharge Reassessment   Provided patient/caregiver education on the expected discharge date and the discharge plan Yes   Do you have any problems affording any of your prescribed medications? No   Discharge Plan A Home   Discharge Plan B Home with family   DME Needed Upon Discharge  none   Patient choice form signed by patient/caregiver N/A   Anticipated Discharge Disposition Home   Can the patient/caregiver answer the patient profile reliably? Yes, cognitively intact   Post-Acute Status   Post-Acute Authorization Other  (posible home oxygen)   Post-Acute Placement Status Awaiting Internal Medical Clearance   Discharge Delays None known at this time

## 2020-11-11 ENCOUNTER — PATIENT MESSAGE (OUTPATIENT)
Dept: ADMINISTRATIVE | Facility: OTHER | Age: 55
End: 2020-11-11

## 2020-11-11 ENCOUNTER — NURSE TRIAGE (OUTPATIENT)
Dept: ADMINISTRATIVE | Facility: CLINIC | Age: 55
End: 2020-11-11

## 2020-11-11 VITALS
WEIGHT: 238.13 LBS | DIASTOLIC BLOOD PRESSURE: 66 MMHG | OXYGEN SATURATION: 95 % | BODY MASS INDEX: 33.34 KG/M2 | RESPIRATION RATE: 20 BRPM | HEART RATE: 58 BPM | HEIGHT: 71 IN | TEMPERATURE: 98 F | SYSTOLIC BLOOD PRESSURE: 133 MMHG

## 2020-11-11 PROBLEM — J18.9 PNEUMONIA OF BOTH LOWER LOBES DUE TO INFECTIOUS ORGANISM: Status: RESOLVED | Noted: 2020-11-02 | Resolved: 2020-11-11

## 2020-11-11 LAB
ALBUMIN SERPL BCP-MCNC: 3 G/DL (ref 3.5–5.2)
ALP SERPL-CCNC: 56 U/L (ref 55–135)
ALT SERPL W/O P-5'-P-CCNC: 60 U/L (ref 10–44)
ANION GAP SERPL CALC-SCNC: 14 MMOL/L (ref 8–16)
ANISOCYTOSIS BLD QL SMEAR: SLIGHT
AST SERPL-CCNC: 25 U/L (ref 10–40)
BASOPHILS NFR BLD: 0 % (ref 0–1.9)
BILIRUB SERPL-MCNC: 0.6 MG/DL (ref 0.1–1)
BUN SERPL-MCNC: 26 MG/DL (ref 6–20)
CALCIUM SERPL-MCNC: 8.6 MG/DL (ref 8.7–10.5)
CHLORIDE SERPL-SCNC: 99 MMOL/L (ref 95–110)
CO2 SERPL-SCNC: 24 MMOL/L (ref 23–29)
CREAT SERPL-MCNC: 1 MG/DL (ref 0.5–1.4)
DIFFERENTIAL METHOD: ABNORMAL
EOSINOPHIL NFR BLD: 0 % (ref 0–8)
ERYTHROCYTE [DISTWIDTH] IN BLOOD BY AUTOMATED COUNT: 13.1 % (ref 11.5–14.5)
EST. GFR  (AFRICAN AMERICAN): >60 ML/MIN/1.73 M^2
EST. GFR  (NON AFRICAN AMERICAN): >60 ML/MIN/1.73 M^2
GLUCOSE SERPL-MCNC: 136 MG/DL (ref 70–110)
HCT VFR BLD AUTO: 51.6 % (ref 40–54)
HGB BLD-MCNC: 17.2 G/DL (ref 14–18)
IMM GRANULOCYTES # BLD AUTO: ABNORMAL K/UL (ref 0–0.04)
IMM GRANULOCYTES NFR BLD AUTO: ABNORMAL % (ref 0–0.5)
LYMPHOCYTES NFR BLD: 8 % (ref 18–48)
MCH RBC QN AUTO: 30.9 PG (ref 27–31)
MCHC RBC AUTO-ENTMCNC: 33.3 G/DL (ref 32–36)
MCV RBC AUTO: 93 FL (ref 82–98)
MONOCYTES NFR BLD: 7 % (ref 4–15)
MYELOCYTES NFR BLD MANUAL: 1 %
NEUTROPHILS NFR BLD: 82 % (ref 38–73)
NEUTS BAND NFR BLD MANUAL: 2 %
NRBC BLD-RTO: 0 /100 WBC
PLATELET # BLD AUTO: 345 K/UL (ref 150–350)
PLATELET BLD QL SMEAR: ABNORMAL
PMV BLD AUTO: 9.3 FL (ref 9.2–12.9)
POCT GLUCOSE: 138 MG/DL (ref 70–110)
POTASSIUM SERPL-SCNC: 4.7 MMOL/L (ref 3.5–5.1)
PROT SERPL-MCNC: 6.6 G/DL (ref 6–8.4)
RBC # BLD AUTO: 5.56 M/UL (ref 4.6–6.2)
SODIUM SERPL-SCNC: 137 MMOL/L (ref 136–145)
WBC # BLD AUTO: 17.66 K/UL (ref 3.9–12.7)

## 2020-11-11 PROCEDURE — 25000003 PHARM REV CODE 250: Performed by: INTERNAL MEDICINE

## 2020-11-11 PROCEDURE — 36415 COLL VENOUS BLD VENIPUNCTURE: CPT

## 2020-11-11 PROCEDURE — 94664 DEMO&/EVAL PT USE INHALER: CPT

## 2020-11-11 PROCEDURE — 94640 AIRWAY INHALATION TREATMENT: CPT

## 2020-11-11 PROCEDURE — 25000242 PHARM REV CODE 250 ALT 637 W/ HCPCS: Performed by: INTERNAL MEDICINE

## 2020-11-11 PROCEDURE — 85027 COMPLETE CBC AUTOMATED: CPT

## 2020-11-11 PROCEDURE — 99239 PR HOSPITAL DISCHARGE DAY,>30 MIN: ICD-10-PCS | Mod: ,,, | Performed by: INTERNAL MEDICINE

## 2020-11-11 PROCEDURE — 99900035 HC TECH TIME PER 15 MIN (STAT)

## 2020-11-11 PROCEDURE — 85007 BL SMEAR W/DIFF WBC COUNT: CPT

## 2020-11-11 PROCEDURE — 94761 N-INVAS EAR/PLS OXIMETRY MLT: CPT

## 2020-11-11 PROCEDURE — 80053 COMPREHEN METABOLIC PANEL: CPT

## 2020-11-11 PROCEDURE — 94660 CPAP INITIATION&MGMT: CPT

## 2020-11-11 PROCEDURE — 63600175 PHARM REV CODE 636 W HCPCS: Performed by: INTERNAL MEDICINE

## 2020-11-11 PROCEDURE — 99239 HOSP IP/OBS DSCHRG MGMT >30: CPT | Mod: ,,, | Performed by: INTERNAL MEDICINE

## 2020-11-11 PROCEDURE — 27000221 HC OXYGEN, UP TO 24 HOURS

## 2020-11-11 RX ADMIN — Medication 500 MG: at 08:11

## 2020-11-11 RX ADMIN — AMLODIPINE BESYLATE 5 MG: 5 TABLET ORAL at 08:11

## 2020-11-11 RX ADMIN — DOCUSATE SODIUM 50MG AND SENNOSIDES 8.6MG 1 TABLET: 8.6; 5 TABLET, FILM COATED ORAL at 08:11

## 2020-11-11 RX ADMIN — NEBIVOLOL HYDROCHLORIDE 2.5 MG: 2.5 TABLET ORAL at 08:11

## 2020-11-11 RX ADMIN — Medication 1000 UNITS: at 08:11

## 2020-11-11 RX ADMIN — THERA TABS 1 TABLET: TAB at 08:11

## 2020-11-11 RX ADMIN — DEXAMETHASONE 6 MG: 4 TABLET ORAL at 08:11

## 2020-11-11 RX ADMIN — IPRATROPIUM BROMIDE AND ALBUTEROL SULFATE 3 ML: .5; 2.5 SOLUTION RESPIRATORY (INHALATION) at 08:11

## 2020-11-11 NOTE — PLAN OF CARE
Patient medically ready for discharge to home.  Any necessary transport setup by .  This CM scheduled necessary follow-up appointments.  Family/ patient aware of discharge.    Future Appointments   Date Time Provider Department Center   11/17/2020  1:20 PM Monty Pena MD [facility] Melba Babcock RN CM [facility]  Department of Case Management  [address] [place] [id number]       11/11/20 0946   Final Note   Assessment Type Final Discharge Note   Anticipated Discharge Disposition Home   What phone number can be called within the next 1-3 days to see how you are doing after discharge? [phone]   Hospital Follow Up  Appt(s) scheduled? Yes  (Dr. Pena 11/17/20 @ 1320PM)   Discharge plans and expectations educations in teach back method with documentation complete? Yes  (per bedside nurse)   Right Care Referral Info   Post Acute Recommendation No Care   Post-Acute Status   Post-Acute Authorization Other   Other Status No Post-Acute Service Needs   Discharge Delays None known at this time

## 2020-11-11 NOTE — PLAN OF CARE
11/11/20 0936   Post-Acute Status   Post-Acute Authorization Other   Other Status No Post-Acute Service Needs     Patient expected to dc home today with no post acute needs.       Ethel Valiente LMSW  [facility]   [id number]

## 2020-11-11 NOTE — PLAN OF CARE
Patient to be wheeled to lobby by PCT. Wife here to pick-up patient for discharge. Patient IV dc'd, site wnl. Dressing applied, dry and intact. Tele returned. Patient verbalized understanding of all discharge instructions and follow-up appointments. No further questions at this time. VS stable and denies pain at time of discharge. Patient leaving with all personal belongings.

## 2020-11-11 NOTE — PLAN OF CARE
Problem: Adult Inpatient Plan of Care  Goal: Plan of Care Review  Outcome: Ongoing, Progressing  Goal: Patient-Specific Goal (Individualization)  Outcome: Ongoing, Progressing  Goal: Absence of Hospital-Acquired Illness or Injury  Outcome: Ongoing, Progressing  Goal: Optimal Comfort and Wellbeing  Outcome: Ongoing, Progressing  Goal: Readiness for Transition of Care  Outcome: Ongoing, Progressing  Goal: Rounds/Family Conference  Outcome: Ongoing, Progressing     Problem: Infection  Goal: Infection Symptom Resolution  Outcome: Ongoing, Progressing     Problem: Fluid Imbalance (Pneumonia)  Goal: Fluid Balance  Outcome: Ongoing, Progressing

## 2020-11-11 NOTE — PROGRESS NOTES
Home Oxygen Evaluation    Date Performed: 2020    1) Patient's Home O2 Sat on room air, while at rest: 95%        If O2 sats on room air at rest are 88% or below, patient qualifies. No additional testing needed. Document N/A in steps 2 and 3. If 89% or above, complete steps 2.      2) Patient's O2 Sat on room air while exercisin%        If O2 sats on room air while exercising remain 89% or above patient does not qualify, no further testing needed Document N/A in step 3. If O2 sats on room air while exercising are 88% or below, continue to step 3.      3) Patient's O2 Sat while exercising on O2: N/A     (Must show improvement from #2 for patients to qualify)    If O2 sats improve on oxygen, patient qualifies for portable oxygen. If not, the patient does not qualify.

## 2020-11-11 NOTE — DISCHARGE INSTRUCTIONS
Your test was POSITIVE for COVID-19 (coronavirus).       Please isolate yourself at home.  You may leave home and/or return to work once the following conditions are met:    If you were not hospitalized and are not severely immunocompromised*:   More than 10 days since symptoms first appeared AND   More than 24 hours fever free without medications AND   Symptoms have improved     If you were hospitalized OR are severely immunocompromised*:   More than 20 days since symptoms first appeared   More than 24 hours fever free without medications   Symptoms have improved    If you had no symptoms but tested positive:   More than 10 days since the date of the first positive test (20 days if severely immunocompromised).   If you develop symptoms, then use the guidelines above.     *Definition of severely immunocompromised:  - Current chemotherapy for cancer  - Untreated HIV with CD4 count less than 200  - Combined primary immunodeficiency disorder  - Prednisone more than 20 mg per day for more than 14 days  - Post-transplant patients    Additional instructions:   Separate yourself from other people and animals in your home.   Call ahead before visiting your doctor.   Wear a facemask when around others.   Cover your coughs and sneezes.   Wash your hands often with soap and water; hand  can be used, too.   Avoid sharing personal household items.   Wipe down surfaces used daily.   Monitor your symptoms. Seek prompt medical attention if your illness is worsening (e.g., difficulty breathing).    Before seeking care, call your healthcare provider.   If you have a medical emergency and need to call 911, notify the dispatch personnel that you have, or are being evaluated for COVID-19. If possible, put on a facemask before emergency medical services arrive.        Contact Tracing    As one of the next steps, you will receive a call or text from the [place] Department of Health ([facility]) COVID-19 Tracing Team.  See the contact information below so you know not to ignore the health departments call. It is important that you contact them back immediately so they can help.      Contact Tracer Number:  [phone]  Caller ID for most carriers: [place] Dept Health     What is contact tracing?  · Contact tracing is a process that helps identify everyone who has been in close contact with an infected person. Contact tracers let those people know they may have been exposed and guide them on next steps. Confidentiality is important for everyone; no one will be told who may have exposed them to the virus.  · Your involvement is important. The more we know about where and how this virus is spreading, the better chance we have at stopping it from spreading further.  What does exposure mean?  · Exposure means you have been within 6 feet for more than 15 minutes with a person who has or had COVID-19.  What kind of questions do the contact tracers ask?  · A contact tracer will confirm your basic contact information including name, address, phone number, and next of kin, as well as asking about any symptoms you may have had. Theyll also ask you how you think you may have gotten sick, such as places where you may have been exposed to the virus, and people you were with. Those names will never be shared with anyone outside of that call, and will only be used to help trace and stop the spread of the virus.   I have privacy concerns. How will the state use my information?  · Your privacy about your health is important. All calls are completed using call centers that use the appropriate health privacy protection measures (HIPAA compliance), meaning that your patient information is safe. No one will ever ask you any questions related to immigration status. Your health comes first.   Do I have to participate?  · You do not have to participate, but we strongly encourage you to. Contact tracing can help us catch and control new outbreaks as  theyre developing to keep your friends and family safe.   What if I dont hear from anyone?  · If you dont receive a call within 24 hours, you can call the number above right away to inquire about your status. That line is open from 8:00 am - 8:00 p.m., 7 days a week.  Contact tracing saves lives! Together, we have the power to beat this virus and keep our loved ones and neighbors safe.    For more information see CDC link below.      https://www.cdc.gov/coronavirus/2019-ncov/hcp/guidance-prevent-spread.html#precautions        Sources:  [facility], [place] Department [facility] and [facility]           Sincerely,     Chino Fields MD

## 2020-11-11 NOTE — DISCHARGE SUMMARY
Discharge Summary  Hospital Medicine  [facility]      Attending Physician on Discharge: Chino Fields MD  Hospital Medicine Team: [facility]OS[facility] MED G  Date of Admission:  11/2/2020     Date of Discharge:  11/11/2020    Active Hospital Problems    Diagnosis  POA    Pneumonia due to COVID-19 virus [U07.1, J12.89]  Yes    MARISA on CPAP [G47.33, Z99.89]  Not Applicable      Resolved Hospital Problems    Diagnosis Date Resolved POA    *Pneumonia of both lower lobes due to infectious organism [J18.9] 11/11/2020 Yes    Hypoxia [R09.02] 11/11/2020 Yes       Consults:   None     History of Present Illness:  Mr Derek Joseph Weil is a 54 y.o. male with hx of hypertension presenting with [facility] with progressively worsening dry cough with associated fevers, chills, malaise, weakness, insomnia, dyspnea (dalia on exertion), chest congestion, decreased po intake, intermittent diarrhea since 10/28 (6 day hx). He stated that he was otherwise well before his symptoms. He denied any dizziness, syncope, trauma, pleurisy, N/V, bleeds. States these symptoms were similar to his piror episode of PNA 1 yr ago. His wife is diagnosed with COVID and has been symptomatic for the past week, now admitted to hospital. With concerns for COVID PNA, he presenting to the ED. Nonsmoker. No alcohol use or illicit drug use.     In the ED, patient was febrile  Tm 102.4, normotensive, saturating well on RA. Labs normal for normal wbc, COVID+, Na 132, .4, Ferritin 789, , . CXR with multifocal PNA.      Admitted to hospital medicine for COVID PNA. He was started on supportive tx with inhalers, steroids, abx and oxygen PRN. Gradually improving however continues to have chills, weakness and dyspnea on exertion. Continuing to monitoring him at this time.       Hospital Course:     Derek Joseph Weil was admitted to Hospital Medicine for treatment of suspected COVID-19 viral infection and was treated with supportive care  following a comprehensive physical, radiographic, and lab evaluation tailored to the current standard of care for COVID19. Please review the admission H&P and the studies listed below for details. He improved with supportive care and was found to be suitable for discharge home without oxygen.    Additional details of the hospitalization include completing levquin and Remdesivir for 5 days and dexamethasone for 10 days.    On the day of discharge, isolation precautions were reviewed at length verbally. The patient was also provided with written isolation guidelines modified from the [place] Department of [facility] and [facility] as well as the [facility], as part of discharge paperwork. An updated phone number was obtained, which will be used to contact the patient when results are available, and positive patients will be enrolled in both the COVID-19 Home Symptom Monitoring program.    Laboratory Values:  Lab Results   Component Value Date    [id number] Positive (A) 11/02/2020       Recent Labs   Lab 11/09/20  0247 11/10/20  0728 11/11/20  0302   WBC 18.01* 20.48* 17.66*   LYMPH 5.0*  CANCELED 12.0*  CANCELED 8.0*   HGB 16.9 16.6 17.2   HCT 50.4 49.1 51.6    331 345     Recent Labs   Lab 11/05/20  0519 11/06/20  0323 11/07/20  0304 11/09/20  0247 11/10/20  0728 11/11/20  0302    139 135* 135* 136 137   K 4.5 4.6 4.7 4.6 4.5 4.7   CL 99 102 100 98 100 99   CO2 29 26 24 28 27 24   BUN 22* 20 21* 25* 24* 26*   CREATININE 1.1 1.0 0.9 1.0 0.8 1.0   * 175* 168* 194* 126* 136*   CALCIUM 9.0 8.5* 8.8 8.6* 8.5* 8.6*   MG 2.4 2.4 2.2  --   --   --    PHOS 4.5 4.5 4.5  --   --   --      Recent Labs   Lab 11/09/20  0247 11/10/20  0728 11/11/20  0302   ALKPHOS 60 52* 56   ALT 59* 62* 60*   AST 31 29 25   ALBUMIN 2.8* 2.8* 3.0*   PROT 6.6 6.4 6.6   BILITOT 0.4 0.6 0.6        Recent Labs     11/10/20  0728   DDIMER 0.35   FERRITIN 537*   CRP 7.8   *         Microbiology:  Microbiology Results (last 7 days)      Procedure Component Value Units Date/Time    Blood culture x two cultures. Draw prior to antibiotics. [[id number]] Collected: 11/02/20 1309    Order Status: Completed Specimen: Blood from Peripheral, Hand, Left Updated: 11/07/20 1412     Blood Culture, Routine No growth after 5 days.    Narrative:      Aerobic and anaerobic    Blood culture x two cultures. Draw prior to antibiotics. [[id number]] Collected: 11/02/20 1308    Order Status: Completed Specimen: Blood from Peripheral, Hand, Right Updated: 11/07/20 1412     Blood Culture, Routine No growth after 5 days.    Narrative:      Aerobic and anaerobic          Imaging:  Imaging Results          X-Ray Chest AP Portable (Final result)  Result time 11/02/20 12:54:14    Final result by Carlos Vuong MD (11/02/20 12:54:14)                 Impression:      1. Coarse interstitial attenuation bilaterally may reflect edema or other nonspecific pneumonitis.  Patchy consolidative opacity is noted projected over the left mid and lower lung zones concerning for infectious process.  Additional early consolidative change may be present projected over the right lower lung zone.  Correlation is advised.      Electronically signed by: Carlos Vuong MD  Date:    11/02/2020  Time:    12:54             Narrative:    EXAMINATION:  XR CHEST AP PORTABLE    CLINICAL HISTORY:  Suspected Covid-19 Virus Infection;    TECHNIQUE:  Single frontal view of the chest was performed.    COMPARISON:  08/25/2016    FINDINGS:  The cardiomediastinal silhouette is not enlarged.  There is elevation of the right hemidiaphragm..  There is no pleural effusion.  The trachea is midline.  The lungs are symmetrically expanded bilaterally with patchy consolidative attenuation projected over the left mid and lower lung zones.  Additional consolidative opacity may be present projected over the right lower lung zone however is obscured by diaphragmatic elevation..  There is no pneumothorax.  The osseous  structures are remarkable for degenerative changes..                                Cardiac:  ECG Results          EKG 12-lead (Final result)  Result time 11/03/20 13:21:15    Final result by Interface, Lab In [facility] (11/03/20 13:21:15)                 Narrative:    Test Reason : Z20.828,    Vent. Rate : 076 BPM     Atrial Rate : 076 BPM     P-R Int : 126 ms          QRS Dur : 096 ms      QT Int : 392 ms       P-R-T Axes : 073 054 068 degrees     QTc Int : 441 ms    Normal sinus rhythm  Normal ECG  When compared with ECG of 25-AUG-2016 19:39,  No significant change was found  Confirmed by Javon BAPTISTE, Alfredo ([id number]) on 11/3/2020 6:18:06 AM    Referred By: DOUGLAS   SELF           Confirmed By:Alfredo Alejandro MD                              Results for orders placed during the hospital encounter of 11/02/20   Echo Color Flow Doppler? Yes    Narrative · The left ventricle is normal in size with normal systolic function. The   estimated ejection fraction is 65%.  · Normal left ventricular diastolic function.  · Normal right ventricular size with normal right ventricular systolic   function.  · Normal central venous pressure (3 mmHg).            Procedures:   * No surgery found *        Current Discharge Medication List      START taking these medications    Details   pulse oximeter (PULSE OXIMETER) device by Apply Externally route 2 (two) times a day. Use twice daily at 8 AM and 3 PM and record the value in [facility] [employer] as directed.  Qty: 1 each, Refills: 0    Comments: This is a NO CHARGE item.  Please override price to zero.  DO NOT PRINT.  NORMAL MODE e-PRESCRIBE ONLY.         CONTINUE these medications which have NOT CHANGED    Details   acetaminophen (TYLENOL) 325 MG tablet Take by mouth. 2 Tablet Oral Every 4 hours      albuterol (PROVENTIL) 2.5 mg /3 mL (0.083 %) nebulizer solution USE 1 VIAL(3MLS) IN NEBULIZER EVERY 6 HOURS AS NEEDED FOR WHEEZING OR SHORTNESS OF BREATH( ALSO AS NEEDED FOR COUGH)  Qty: 180 mL,  Refills: 0    Associated Diagnoses: Viral URI      amlodipine (NORVASC) 5 MG tablet Take by mouth. 1 Tablet Oral Every evening      atorvastatin (LIPITOR) 10 MG tablet       epinephrine (EPIPEN) 0.3 mg/0.3 mL AtIn Inject 0.3 mLs (0.3 mg total) into the muscle as needed (severe allergic reaction).  Qty: 2 each, Refills: 0      famotidine (PEPCID) 20 MG tablet Take 1 tablet (20 mg total) by mouth 2 (two) times daily.  Qty: 20 tablet, Refills: 0      levocetirizine (XYZAL) 5 MG tablet Take 1 tablet (5 mg total) by mouth every evening.  Qty: 30 tablet, Refills: 0    Associated Diagnoses: Acute mucoid otitis media of both ears      nebivolol (BYSTOLIC) 5 MG Tab Take by mouth. 1 Tablet Oral Every day               Discharge Diet:regular diet with Normal Fluid intake of 1500 - 2000 mL per day    Activity: activity as tolerated    Discharge Condition: Good    Disposition: Home or Self Care    Follow up:    Follow-up Information     Monty Pena MD On 11/17/2020.    Specialties: Internal Medicine, Wound Care  Why: hospital follow-up @ 1:20pm  Contact information:  [place] [id number] [phone]                   Tests pending at the time of discharge: none        Time spent  on the discharge of the patient including review of hospital course with the patient. reviewing discharge medications and arranging follow-up care: 45 mins    Discharge examination: Patient was seen and examined on the date of discharge and determined to be suitable for discharge.    Chino Fields MD  Hospital Medicine Staff  Pager: [phone]; Spectra: [phone]

## 2020-11-11 NOTE — TELEPHONE ENCOUNTER
Attempted to contact pt for enrollment in Covid Surveillance program. Spoke with wife who verified pt's name and . She states pt is sleeping, he was discharged earlier today. Pt's wife is familiar with Covid Surveillance program as she is enrolled in the program. She submitted consent for pt. Tasks will start tomorrow for pt to enter VS and symptoms. Pt has pulse ox. Verified contact and emergency contact info. Unable to triage pt due to him being asleep. Pt has number for OOC if symptoms worsen or any further concerns. Will continue to monitor.    Called patient to review COVID-19 Surveillance Program enrollment process.    Smartphone: yes    [facility]: yes    Program consent: yes    Pulse oximeter status: yes    Verified emergency contacts: yes    Program Overview: Reviewed , no response process, and importance of correct emergency contacts in event that well-being check is warranted. Patient will call [phone]  to speak with an OnCall nurse, if needed. Pt aware that if Sp02 <94% or if they have any worsening symptoms, they need to go to the emergency department. If they are having a medical emergency, they will call 911. Otherwise, patient will continue to submit data as scheduled. Reviewed importance of wearing mask if self or family members leave the house.     Patient had no further questions.      Reason for Disposition   Health Information question, no triage required and triager able to answer question    Protocols used: INFORMATION ONLY CALL - NO TRIAGE[facility]

## 2020-11-11 NOTE — PLAN OF CARE
11/11/2020      Derek Joseph Weil  [address] [place] [id number]          Hospital Medicine Dept.  [facility] [address] [place] [address]  [phone] after hours  [phone] fax Derek Joseph Weil has been hospitalized at the [facility] since 11/2/2020.  Please excuse the patient from duties.  Patient may return on 11/16/2020.  No restrictions.     Please contact me if you have any questions.                  __________________________  Chino Fields MD  11/11/2020

## 2020-11-11 NOTE — NURSING
No acute distress noted. Patient in stable condition on room air with O2 sats %. Patient without any complaints at this time. Plan of care discussed and reinforced with patient. Patient is oriented to self and place at this time. Patient resting in bed.  Will continue to monitor.

## 2020-11-12 ENCOUNTER — PATIENT MESSAGE (OUTPATIENT)
Dept: ADMINISTRATIVE | Facility: OTHER | Age: 55
End: 2020-11-12

## 2020-11-12 ENCOUNTER — PATIENT OUTREACH (OUTPATIENT)
Dept: ADMINISTRATIVE | Facility: CLINIC | Age: 55
End: 2020-11-12

## 2020-11-12 ENCOUNTER — NURSE TRIAGE (OUTPATIENT)
Dept: ADMINISTRATIVE | Facility: CLINIC | Age: 55
End: 2020-11-12

## 2020-11-12 NOTE — TELEPHONE ENCOUNTER
Pt called due to escalation for no response them put in his vitals and escalation for high for O2 sat 94 pt denies SOB except for slight SOB with activity. Pt coughed and it sounded tight. Pt rechecked pulse ox and it went to 97....then went back down to 92-93 will do RN to CEDRICK handoff in order to eval.  Cedrick S Mary spoke with pt she said that she would chart later.  Reason for Disposition   MILD difficulty breathing (e.g., minimal/no SOB at rest, SOB with walking, pulse <100)    Additional Information   Negative: SEVERE difficulty breathing (e.g., struggling for each breath, speaks in single words)   Negative: Difficult to awaken or acting confused (e.g., disoriented, slurred speech)   Negative: Bluish (or gray) lips or face now   Negative: Shock suspected (e.g., cold/pale/clammy skin, too weak to stand, low BP, rapid pulse)   Negative: Sounds like a life-threatening emergency to the triager   Negative: [1] COVID-19 exposure AND [2] NO symptoms   Negative: COVID-19 and Breastfeeding, questions about   Negative: [1] Adult with possible COVID-19 symptoms AND [2] triager concerned about severity of symptoms or other causes   Negative: SEVERE or constant chest pain or pressure (Exception: mild central chest pain, present only when coughing)   Negative: MODERATE difficulty breathing (e.g., speaks in phrases, SOB even at rest, pulse 100-120)   Negative: Patient sounds very sick or weak to the triager    Protocols used: CORONAVIRUS (COVID-19) DIAGNOSED OR [id number]

## 2020-11-12 NOTE — PATIENT INSTRUCTIONS
Pneumonia (Adult)  Pneumonia is an infection deep within the lungs. It is in the small air sacs (alveoli). Pneumonia may be caused by a virus or bacteria. Pneumonia caused by bacteria is usually treated with an antibiotic. Severe cases may need to be treated in the hospital. Milder cases can be treated at home. Symptoms usually start to get better during the first 2 days of treatment.    Home care  Follow these guidelines when caring for yourself at home:  · Rest at home for the first 2 to 3 days, or until you feel stronger. Dont let yourself get overly tired when you go back to your activities.  · Stay away from cigarette smoke - yours or other peoples.  · You may use acetaminophen or ibuprofen to control fever or pain, unless another medicine was prescribed. If you have chronic liver or kidney disease, talk with your healthcare provider before using these medicines. Also talk with your provider if youve had a stomach ulcer or gastrointestinal bleeding. Dont give aspirin to anyone younger than 18 years of age who is ill with a fever. It may cause severe liver damage.  · Your appetite may be poor, so a light diet is fine.  · Drink 6 to 8 glasses of fluids every day to make sure you are getting enough fluids. Beverages can include water, sport drinks, sodas without caffeine, juices, tea, or soup. Fluids will help loosen secretions in the lung. This will make it easier for you to cough up the phlegm (sputum). If you also have heart or kidney disease, check with your healthcare provider before you drink extra fluids.  · Take antibiotic medicine prescribed until it is all gone, even if you are feeling better after a few days.  Follow-up care  Follow up with your healthcare provider in the next 2 to 3 days, or as advised. This is to be sure the medicine is helping you get better.  If you are 65 or older, you should get a pneumococcal vaccine and a yearly flu (influenza) shot. You should also get these vaccines if  you have chronic lung disease like asthma, emphysema, or COPD. Recently, a second type of pneumonia vaccine has become available for everyone over 65 years old. This is in addition to the previous vaccine. Ask your provider about this.  When to seek medical advice  Call your healthcare provider right away if any of these occur:  · You dont get better within the first 48 hours of treatment  · Shortness of breath gets worse  · Rapid breathing (more than 25 breaths per minute)  · Coughing up blood  · Chest pain gets worse with breathing  · Fever of 100.4°F (38°C) or higher that doesnt get better with fever medicine  · Weakness, dizziness, or fainting that gets worse  · Thirst or dry mouth that gets worse  · Sinus pain, headache, or a stiff neck  · Chest pain not caused by coughing    © 5555-1186 [employer]. [address] [place] [address], [place] [address]. All rights reserved. This information is not intended as a substitute for professional medical care. Always follow your healthcare professional's instructions.

## 2020-11-13 ENCOUNTER — PATIENT MESSAGE (OUTPATIENT)
Dept: ADMINISTRATIVE | Facility: OTHER | Age: 55
End: 2020-11-13

## 2020-11-13 ENCOUNTER — NURSE TRIAGE (OUTPATIENT)
Dept: ADMINISTRATIVE | Facility: CLINIC | Age: 55
End: 2020-11-13

## 2020-11-13 ENCOUNTER — TELEPHONE (OUTPATIENT)
Dept: HOME HEALTH SERVICES | Facility: CLINIC | Age: 55
End: 2020-11-13

## 2020-11-13 NOTE — TELEPHONE ENCOUNTER
0914 1st attempt to call  [phone]: no answer; no contact; verified phone number; LM on  to call for further assistance [phone] or call 911 or go to nearest ER in an emergency situation.    0917 2nd attempt: call placed to [phone]; no answer; no contact; phone number verified.    0923 3rd attempt: Patient initially escalated due to SpO2 94%, however patient advised to sit up to take VS and take a few deep breaths and cough prior to taking reading; patient retook VS (see below) during phone call. Vital signs and symptoms did not trigger a second escalation; therefore, no further triage is needed at this time.    Sp02: 98  Pulse:76  Temperature: 98.6  SOB at rest (0-5): 0  SOB with activity (0-5): 0  Worsening symptoms: no  Fever symptoms: no  Increased home oxygen: n/a    Reason for Disposition   Health Information question, no triage required and triager able to answer question    Protocols used: INFORMATION ONLY CALL - NO TRIAGE-[facility]

## 2020-11-13 NOTE — TELEPHONE ENCOUNTER
No contact; computer connection issues.    Reason for Disposition   Unable to complete triage due to phone connection issues    Protocols used: NO CONTACT OR DUPLICATE CONTACT CALL-A-OH

## 2020-11-13 NOTE — TELEPHONE ENCOUNTER
Patient initially escalated due to no response, however patient entered vitals prior to phone call. Vital signs and symptoms did not trigger a second escalation; therefore, no follow up call is needed at this time.    Sp02: 95  Pulse: 65  Temperature: 98.6  SOB at rest (0-5): 0  SOB with activity (0-5): 1  Worsening symptoms: no  Fever symptoms: no  Increased home oxygen: n/a    Reason for Disposition   Caller has cancelled the call before the first contact    Protocols used: NO CONTACT OR DUPLICATE CONTACT CALL-A-AH

## 2020-11-13 NOTE — TELEPHONE ENCOUNTER
Called patient due to RN escalation in COVID Surveillance program. Pt escalated due to  94%O2 SAT .  This is from encounter 11- 5:30 PM     Patient location:  Home    Vitals: Sp02 : 94 %. P55: . Temp: 98.6  54 y.o. male with pertinent PMHx  HTN, high cholesterol    on day 15  of Covid symptoms. Positive Covid screen 11-2-2020 . CXR on  11-2-2020   1. Coarse interstitial attenuation bilaterally may reflect edema or other nonspecific pneumonitis.  Patchy consolidative opacity is noted projected over the left mid and lower lung zones concerning for infectious process.  Additional early consolidative change may be present projected over the right lower lung zone.  Correlation is advised.    Home oxygen: no.  Was assessed and 6m walk showed o2 sat 91% - did not qualify    COVID-19 Hospitalization History: Bilateral pneumonia   In ICU and hospitalized 11-2 thru 11-11  Did receive remdesivir and dexamethasone     HPI:  Feeling good.  O2 sat up to 95% when talking to me on phone .  Recently DC day before.       ROS: Denies worsening cough, light headedness, fever, chills, diaphoresis, chest pain, abdominal pain, emesis, diarrhea or further symptoms.     Assessment: Vitals appear WNL. During phone call, patient appears alert and oriented. Able to speak in full sentences without difficulty. No audible wheezing heard during call. 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   Discussed get IS working at least every 1-2 hours   Walk around and deep breath and cough   Encouraged liquids   Wants to go outside, discussed taking short episodes of walking and being outside - Do not overdo exercise due to his post hospital status.     Reviewed with patient the reasons for seeking emergency care. Pt aware that if Sp02 <94% or if they have any worsening symptoms, they need to go to the emergency department. If they are having a medical emergency, they will call 911. Otherwise, patient will  continue to submit data as scheduled. Reviewed importance of wearing mask if self or family members leave the house.       Advised next steps: Continue care at home

## 2020-11-13 NOTE — TELEPHONE ENCOUNTER
"O2 94%, HR 72, T 98.6, Fever/chills N, worse symptoms N, SOB 0/0     Covid-19 surveillance program escalation due to abnormal VS entry of O2 94%. Follow up call, patient states entry may be incorrect, his wife said he was 97% then entered 94% in the mark. Wife stated "it shows one number then it changes"  Reviewed correct pulse oximeter procedure with both of them. Rechecked, new reading 93-94% at rest. Denies SOB, chest pain or pressure. Stated "I do feel a little light-headed" dry lips, fatigue. Denies N/V/D, fever, headache, muscle cramps or twitching and irregularities to heart rhythm. Per protocol, referred to MARK for management and telephone consultation.    Reason for Disposition   [1] COVID-19 diagnosed by positive lab test AND [2] mild symptoms (e.g., cough, fever, others) AND [3] no complications or SOB    Additional Information   Negative: Severe difficulty breathing (e.g., struggling for each breath, speaks in single words)   Negative: Difficult to awaken or acting confused (e.g., disoriented, slurred speech)   Negative: Bluish (or gray) lips or face now   Negative: Shock suspected (e.g., cold/pale/clammy skin, too weak to stand, low BP, rapid pulse)   Negative: Sounds like a life-threatening emergency to the triager   Negative: [1] COVID-19 exposure AND [2] no symptoms   Negative: COVID-19 and Breastfeeding, questions about   Negative: [1] Adult with possible COVID-19 symptoms AND [2] triager concerned about severity of symptoms or other causes   Negative: SEVERE or constant chest pain or pressure (Exception: mild central chest pain, present only when coughing)   Negative: MODERATE difficulty breathing (e.g., speaks in phrases, SOB even at rest, pulse 100-120)   Negative: MILD difficulty breathing (e.g., minimal/no SOB at rest, SOB with walking, pulse <100)   Negative: Chest pain   Negative: Patient sounds very sick or weak to the triager   Negative: Fever > 103 F (39.4 C)   Negative: [1] " Fever > 101 F (38.3 C) AND [2] age > 60   Negative: [1] Fever > 100.0 F (37.8 C) AND [2] bedridden (e.g., nursing home patient, CVA, chronic illness, recovering from surgery)   Negative: HIGH RISK patient (e.g., age > 64 years, diabetes, heart or lung disease, weak immune system) (Exception: has already been evaluated by healthcare provider and has no new or worsening symptoms)   Negative: [1] COVID-19 infection suspected by caller or triager AND [2] mild symptoms (cough, fever, or others) AND [3] no complications or SOB   Negative: Fever present > 3 days (72 hours)   Negative: [1] Fever returns after gone for over 24 hours AND [2] symptoms worse or not improved   Negative: [1] Continuous (nonstop) coughing interferes with work or school AND [2] no improvement using cough treatment per protocol   Negative: Cough present > 3 weeks    Protocols used: CORONAVIRUS (COVID-19) DIAGNOSED OR [id number]

## 2020-11-14 ENCOUNTER — PATIENT MESSAGE (OUTPATIENT)
Dept: ADMINISTRATIVE | Facility: OTHER | Age: 55
End: 2020-11-14

## 2020-11-14 ENCOUNTER — NURSE TRIAGE (OUTPATIENT)
Dept: ADMINISTRATIVE | Facility: CLINIC | Age: 55
End: 2020-11-14

## 2020-11-15 ENCOUNTER — TELEPHONE (OUTPATIENT)
Dept: HOME HEALTH SERVICES | Facility: CLINIC | Age: 55
End: 2020-11-15

## 2020-11-15 ENCOUNTER — PATIENT MESSAGE (OUTPATIENT)
Dept: ADMINISTRATIVE | Facility: OTHER | Age: 55
End: 2020-11-15

## 2020-11-15 ENCOUNTER — NURSE TRIAGE (OUTPATIENT)
Dept: ADMINISTRATIVE | Facility: CLINIC | Age: 55
End: 2020-11-15

## 2020-11-15 NOTE — TELEPHONE ENCOUNTER
"Pt contacted for Surveillance escalation - PO2=94% and SOB 1/5 @ rest and w/activity. On retake pt reports PO2=94% after deep breaths and coughing per NT instruction. Pt does report feeling "light headed" when moving about. Denies any fever or SOB, pt able to speak in full sentences without noted SOB or cough. Covid 19 protocol used and pt advised on home care. Pt instructed on cough and deep breathing exercises and importance of hydration.  NT will escalate this encounter to RAMONITA for continued PO2 of 94% and c/o light headedness/ dizzy feeling.     Reason for Disposition   MILD difficulty breathing (e.g., minimal/no SOB at rest, SOB with walking, pulse <100)    Additional Information   Negative: Severe difficulty breathing (e.g., struggling for each breath, speaks in single words)   Negative: Difficult to awaken or acting confused (e.g., disoriented, slurred speech)   Negative: Bluish (or gray) lips or face now   Negative: Shock suspected (e.g., cold/pale/clammy skin, too weak to stand, low BP, rapid pulse)   Negative: Sounds like a life-threatening emergency to the triager   Negative: [1] COVID-19 suspected (e.g., cough, fever, shortness of breath) AND [2] mild symptoms AND [3] public health department recommends testing   Negative: [1] COVID-19 exposure AND [2] no symptoms   Negative: COVID-19 and Breastfeeding, questions about   Negative: SEVERE or constant chest pain (Exception: mild central chest pain, present only when coughing)   Negative: MODERATE difficulty breathing (e.g., speaks in phrases, SOB even at rest, pulse 100-120)   Negative: Patient sounds very sick or weak to the triager    Protocols used: CORONAVIRUS (COVID-19) - DIAGNOSED OR [id number]      "

## 2020-11-15 NOTE — TELEPHONE ENCOUNTER
Called patient due to RN escalation in COVID Surveillance program. Pt escalated due to SOB and O2 sat of 94%.    Patient location: Home     Vitals: Sp02 : 94%. P:65 . Temp: 98.6    54 y.o. male with pertinent PMHx HTN,  High cholesterol, MARISA, Obesity  with Covid symptoms. Positive Covid screen 11/2/2020. CXR on 11/2/2020. Home oxygen: no. COVID-19 Hospitalization History: none. Patient escalated due to SOB and O2 sat of 94%. Spoke with patient. Patient reports current O2 sat of 95-96%. Patient reports mild SOB with activity and occasionally at rest. Patient also reports feeling lightheaded with activity. Patient denies any SOB at this time or difficulty breathing. Denies any chest pains or fever. Will continue to monitor patient.    HPI:    ROS: Denies worsening cough, light headedness, fever, chills, diaphoresis, chest pain, abdominal pain, emesis, diarrhea or further symptoms.     Assessment: Vitals appear WNL. During phone call, patient appears alert and oriented. Able to speak in full sentences without difficulty. No audible wheezing heard during call.    Plan:    Reviewed with patient the reasons for seeking emergency care. Pt aware that if Sp02 <94% or if they have any worsening symptoms, they need to go to the emergency department. If they are having a medical emergency, they will call 911. Otherwise, patient will continue to submit data as scheduled. Reviewed importance of wearing mask if self or family members leave the house.     Advised next steps: Continue care at home

## 2020-11-16 ENCOUNTER — PATIENT MESSAGE (OUTPATIENT)
Dept: ADMINISTRATIVE | Facility: OTHER | Age: 55
End: 2020-11-16

## 2020-11-17 ENCOUNTER — PATIENT MESSAGE (OUTPATIENT)
Dept: ADMINISTRATIVE | Facility: OTHER | Age: 55
End: 2020-11-17

## 2020-11-17 ENCOUNTER — OFFICE VISIT (OUTPATIENT)
Dept: FAMILY MEDICINE | Facility: CLINIC | Age: 55
End: 2020-11-17
Payer: COMMERCIAL

## 2020-11-17 VITALS
OXYGEN SATURATION: 95 % | WEIGHT: 237.88 LBS | TEMPERATURE: 98 F | RESPIRATION RATE: 19 BRPM | HEART RATE: 75 BPM | DIASTOLIC BLOOD PRESSURE: 85 MMHG | SYSTOLIC BLOOD PRESSURE: 151 MMHG | BODY MASS INDEX: 33.3 KG/M2 | HEIGHT: 71 IN

## 2020-11-17 DIAGNOSIS — E78.00 HYPERCHOLESTEREMIA: ICD-10-CM

## 2020-11-17 DIAGNOSIS — U07.1 PNEUMONIA DUE TO COVID-19 VIRUS: ICD-10-CM

## 2020-11-17 DIAGNOSIS — I10 ESSENTIAL HYPERTENSION: Primary | ICD-10-CM

## 2020-11-17 DIAGNOSIS — J12.82 PNEUMONIA DUE TO COVID-19 VIRUS: ICD-10-CM

## 2020-11-17 PROCEDURE — 99999 PR PBB SHADOW E&M-EST. PATIENT-LVL III: ICD-10-PCS | Mod: PBBFAC,,, | Performed by: INTERNAL MEDICINE

## 2020-11-17 PROCEDURE — 3008F PR BODY MASS INDEX (BMI) DOCUMENTED: ICD-10-PCS | Mod: CPTII,S$GLB,, | Performed by: INTERNAL MEDICINE

## 2020-11-17 PROCEDURE — 99999 PR PBB SHADOW E&M-EST. PATIENT-LVL III: CPT | Mod: PBBFAC,,, | Performed by: INTERNAL MEDICINE

## 2020-11-17 PROCEDURE — 99495 TRANSJ CARE MGMT MOD F2F 14D: CPT | Mod: S$GLB,,, | Performed by: INTERNAL MEDICINE

## 2020-11-17 PROCEDURE — 3008F BODY MASS INDEX DOCD: CPT | Mod: CPTII,S$GLB,, | Performed by: INTERNAL MEDICINE

## 2020-11-17 PROCEDURE — 1126F PR PAIN SEVERITY QUANTIFIED, NO PAIN PRESENT: ICD-10-PCS | Mod: S$GLB,,, | Performed by: INTERNAL MEDICINE

## 2020-11-17 PROCEDURE — 1126F AMNT PAIN NOTED NONE PRSNT: CPT | Mod: S$GLB,,, | Performed by: INTERNAL MEDICINE

## 2020-11-17 PROCEDURE — 99495 TCM SERVICES (MODERATE COMPLEXITY): ICD-10-PCS | Mod: S$GLB,,, | Performed by: INTERNAL MEDICINE

## 2020-11-17 RX ORDER — TRIAMTERENE AND HYDROCHLOROTHIAZIDE 37.5; 25 MG/1; MG/1
1 CAPSULE ORAL EVERY MORNING
Qty: 90 CAPSULE | Refills: 3
Start: 2020-11-17 | End: 2021-04-30 | Stop reason: SDUPTHER

## 2020-11-17 NOTE — PROGRESS NOTES
"Subjective:       Patient ID: Derek Joseph Weil is a 54 y.o. male.    Chief Complaint: Hospital Follow Up    Est pcp/ hospital followup    HPI: 53 y/o w/ HTN HLD presents after hospitalization for COVID pneumonia Nov 2-11th. Presented with one week of loose stool and dry non productive cough. Was hypoxic required supplemental O2, dexamethasone and remdisvir. Also treated emperically for CAP with five days ceftriaxone. Since discharge has been gradually improving no further diarrhea/loose stool no cough no fevers. Returned to work today. Does feel tired by mid day no early a.m. awakening denies LE swelling. Not clear if taking HCTZ/trimeterene. Had been prescribed by his cardiologist but was not on medication list during hospitalization or at discharge. No orthopnea or PND     Review of Systems   Constitutional: Positive for activity change and fatigue. Negative for appetite change, fever and unexpected weight change.   HENT: Negative for ear pain, rhinorrhea and sore throat.    Eyes: Negative for discharge and visual disturbance.   Respiratory: Negative for chest tightness, shortness of breath and wheezing.    Cardiovascular: Negative for chest pain, palpitations and leg swelling.   Gastrointestinal: Negative for abdominal pain, constipation and diarrhea.   Endocrine: Negative for cold intolerance and heat intolerance.   Genitourinary: Negative for dysuria and hematuria.   Musculoskeletal: Negative for joint swelling and neck stiffness.   Skin: Negative for rash.   Neurological: Negative for dizziness, syncope, weakness and headaches.   Psychiatric/Behavioral: Negative for suicidal ideas.       Objective:     Vitals:    11/17/20 1329   BP: (!) 151/85   BP Location: Right arm   Patient Position: Sitting   BP Method: Medium (Automatic)   Pulse: 75   Resp: 19   Temp: 97.9 °F (36.6 °C)   TempSrc: Oral   SpO2: 95%   Weight: 107.9 kg (237 lb 14 oz)   Height: 5' 11" (1.803 m)          Physical Exam  Constitutional:       " Appearance: He is well-developed.   HENT:      Head: Normocephalic and atraumatic.   Eyes:      General: No scleral icterus.     Conjunctiva/sclera: Conjunctivae normal.   Neck:      Musculoskeletal: Normal range of motion.   Cardiovascular:      Rate and Rhythm: Normal rate and regular rhythm.      Heart sounds: No murmur. No friction rub. No gallop.    Pulmonary:      Effort: Pulmonary effort is normal.      Breath sounds: Normal breath sounds. No wheezing or rales.   Abdominal:      General: There is no distension.      Palpations: Abdomen is soft.      Tenderness: There is no abdominal tenderness. There is no guarding or rebound.   Musculoskeletal: Normal range of motion.         General: No tenderness.      Right lower leg: No edema.      Left lower leg: No edema.   Skin:     General: Skin is warm and dry.   Neurological:      Mental Status: He is alert and oriented to person, place, and time.      Cranial Nerves: No cranial nerve deficit.       Transitional Care Note    Family and/or Caretaker present at visit?  No.  Diagnostic tests reviewed/disposition: No diagnosic tests pending after this hospitalization.  Disease/illness education: COVID pneumonia  Home health/community services discussion/referrals: Patient does not have home health established from hospital visit.  They do not need home health.  If needed, we will set up home health for the patient.   Establishment or re-establishment of referral orders for community resources: No other necessary community resources.   Discussion with other health care providers: No discussion with other health care providers necessary.           Assessment and Plan   1. Essential hypertension  Resume thiazide bp check in two weeks labs prior to return in one month  - triamterene-hydrochlorothiazide 37.5-25 mg (DYAZIDE) 37.5-25 mg per capsule; Take 1 capsule by mouth every morning.  Dispense: 90 capsule; Refill: 3  - Basic Metabolic Panel; Future    2. Pneumonia due to  COVID-19 virus  Recovering well without deficit    3. Hypercholesteremia  Continue statin

## 2020-11-18 ENCOUNTER — PATIENT MESSAGE (OUTPATIENT)
Dept: ADMINISTRATIVE | Facility: OTHER | Age: 55
End: 2020-11-18

## 2020-11-19 ENCOUNTER — PATIENT MESSAGE (OUTPATIENT)
Dept: ADMINISTRATIVE | Facility: OTHER | Age: 55
End: 2020-11-19

## 2020-11-19 ENCOUNTER — NURSE TRIAGE (OUTPATIENT)
Dept: ADMINISTRATIVE | Facility: CLINIC | Age: 55
End: 2020-11-19

## 2020-11-19 NOTE — TELEPHONE ENCOUNTER
Patient initially escalated due to no response, however patient entered vitals prior to phone call. Vital signs and symptoms did not trigger a second escalation; therefore, no follow up call is needed at this time.    Reason for Disposition   Caller has cancelled the call before the first contact    Additional Information   Negative: Caller is angry or rude (e.g., hangs up, verbally abusive, yelling)   Negative: Caller hangs up   Negative: Caller has already spoken with the PCP and has no further questions.   Negative: Caller has already spoken with another triager and has no further questions.   Negative: Caller has already spoken with another triager or PCP AND has further questions AND triager able to answer questions.    Protocols used: NO CONTACT OR DUPLICATE CONTACT CALL-A-

## 2020-11-20 ENCOUNTER — NURSE TRIAGE (OUTPATIENT)
Dept: ADMINISTRATIVE | Facility: CLINIC | Age: 55
End: 2020-11-20

## 2020-11-20 ENCOUNTER — PATIENT MESSAGE (OUTPATIENT)
Dept: ADMINISTRATIVE | Facility: OTHER | Age: 55
End: 2020-11-20

## 2020-11-20 NOTE — TELEPHONE ENCOUNTER
Patient initially escalated due to no response, however patient entered vitals prior to phone call. Vital signs and symptoms did not trigger a second escalation; therefore, no follow up call is needed at this time.    Sp02: 97  Pulse: 80  Temperature: 98.3  SOB at rest (0-5): 0  SOB with activity (0-5): 0  Worsening symptoms: no  Fever symptoms: no  Increased home oxygen: n/a    Reason for Disposition   Caller has cancelled the call before the first contact    Protocols used: NO CONTACT OR DUPLICATE CONTACT CALL-A-AH

## 2020-11-21 ENCOUNTER — PATIENT MESSAGE (OUTPATIENT)
Dept: ADMINISTRATIVE | Facility: OTHER | Age: 55
End: 2020-11-21

## 2020-11-22 ENCOUNTER — PATIENT MESSAGE (OUTPATIENT)
Dept: ADMINISTRATIVE | Facility: OTHER | Age: 55
End: 2020-11-22

## 2020-11-22 ENCOUNTER — NURSE TRIAGE (OUTPATIENT)
Dept: ADMINISTRATIVE | Facility: CLINIC | Age: 55
End: 2020-11-22

## 2020-11-23 ENCOUNTER — PATIENT MESSAGE (OUTPATIENT)
Dept: ADMINISTRATIVE | Facility: OTHER | Age: 55
End: 2020-11-23

## 2020-11-23 ENCOUNTER — NURSE TRIAGE (OUTPATIENT)
Dept: ADMINISTRATIVE | Facility: CLINIC | Age: 55
End: 2020-11-23

## 2020-11-23 NOTE — TELEPHONE ENCOUNTER
Spoke with pt. States his oxygen was 97% and that he is doing well. No additional triage required.

## 2020-11-23 NOTE — TELEPHONE ENCOUNTER
Pt contacted through Covid Surveillance Program for No response escalation. Pt did enter vitals prior to call. Attempted to call pt as pulse ox was reported as 98.6. Pt denies SOB and READ on entry. No answer left message.     This is a registered nurse from [facility] Covid Surveillance Program. I am calling to verify your pulse ox entry as it was reported 98.6. It does not appear you have any SOB at rest or with activity. If you need assistance, please call us back. If you are feeling fine, disregard the call.       My chart message sent.

## 2020-11-24 ENCOUNTER — PATIENT MESSAGE (OUTPATIENT)
Dept: ADMINISTRATIVE | Facility: OTHER | Age: 55
End: 2020-11-24

## 2020-11-24 ENCOUNTER — NURSE TRIAGE (OUTPATIENT)
Dept: ADMINISTRATIVE | Facility: CLINIC | Age: 55
End: 2020-11-24

## 2020-11-25 ENCOUNTER — PATIENT MESSAGE (OUTPATIENT)
Dept: FAMILY MEDICINE | Facility: CLINIC | Age: 55
End: 2020-11-25

## 2020-11-25 ENCOUNTER — PATIENT MESSAGE (OUTPATIENT)
Dept: ADMINISTRATIVE | Facility: OTHER | Age: 55
End: 2020-11-25

## 2020-12-16 ENCOUNTER — LAB VISIT (OUTPATIENT)
Dept: LAB | Facility: HOSPITAL | Age: 55
End: 2020-12-16
Attending: INTERNAL MEDICINE
Payer: COMMERCIAL

## 2020-12-16 DIAGNOSIS — I10 ESSENTIAL HYPERTENSION: ICD-10-CM

## 2020-12-16 LAB
ANION GAP SERPL CALC-SCNC: 9 MMOL/L (ref 8–16)
BUN SERPL-MCNC: 16 MG/DL (ref 6–20)
CALCIUM SERPL-MCNC: 9.7 MG/DL (ref 8.7–10.5)
CHLORIDE SERPL-SCNC: 101 MMOL/L (ref 95–110)
CO2 SERPL-SCNC: 32 MMOL/L (ref 23–29)
CREAT SERPL-MCNC: 1.2 MG/DL (ref 0.5–1.4)
EST. GFR  (AFRICAN AMERICAN): >60 ML/MIN/1.73 M^2
EST. GFR  (NON AFRICAN AMERICAN): >60 ML/MIN/1.73 M^2
GLUCOSE SERPL-MCNC: 140 MG/DL (ref 70–110)
POTASSIUM SERPL-SCNC: 4.5 MMOL/L (ref 3.5–5.1)
SODIUM SERPL-SCNC: 142 MMOL/L (ref 136–145)

## 2020-12-16 PROCEDURE — 80048 BASIC METABOLIC PNL TOTAL CA: CPT

## 2020-12-16 PROCEDURE — 36415 COLL VENOUS BLD VENIPUNCTURE: CPT | Mod: PN

## 2020-12-23 ENCOUNTER — OFFICE VISIT (OUTPATIENT)
Dept: FAMILY MEDICINE | Facility: CLINIC | Age: 55
End: 2020-12-23
Payer: COMMERCIAL

## 2020-12-23 ENCOUNTER — PATIENT MESSAGE (OUTPATIENT)
Dept: FAMILY MEDICINE | Facility: CLINIC | Age: 55
End: 2020-12-23

## 2020-12-23 VITALS
TEMPERATURE: 98 F | RESPIRATION RATE: 16 BRPM | SYSTOLIC BLOOD PRESSURE: 144 MMHG | WEIGHT: 249.13 LBS | BODY MASS INDEX: 34.75 KG/M2 | DIASTOLIC BLOOD PRESSURE: 83 MMHG | HEART RATE: 71 BPM | OXYGEN SATURATION: 96 %

## 2020-12-23 DIAGNOSIS — R73.09 ELEVATED RANDOM BLOOD GLUCOSE LEVEL: ICD-10-CM

## 2020-12-23 DIAGNOSIS — N52.9 ERECTILE DYSFUNCTION, UNSPECIFIED ERECTILE DYSFUNCTION TYPE: ICD-10-CM

## 2020-12-23 DIAGNOSIS — I10 ESSENTIAL HYPERTENSION: Primary | ICD-10-CM

## 2020-12-23 PROCEDURE — 99999 PR PBB SHADOW E&M-EST. PATIENT-LVL III: ICD-10-PCS | Mod: PBBFAC,,, | Performed by: INTERNAL MEDICINE

## 2020-12-23 PROCEDURE — 99214 PR OFFICE/OUTPT VISIT, EST, LEVL IV, 30-39 MIN: ICD-10-PCS | Mod: S$GLB,,, | Performed by: INTERNAL MEDICINE

## 2020-12-23 PROCEDURE — 3077F SYST BP >= 140 MM HG: CPT | Mod: CPTII,S$GLB,, | Performed by: INTERNAL MEDICINE

## 2020-12-23 PROCEDURE — 3079F DIAST BP 80-89 MM HG: CPT | Mod: CPTII,S$GLB,, | Performed by: INTERNAL MEDICINE

## 2020-12-23 PROCEDURE — 99999 PR PBB SHADOW E&M-EST. PATIENT-LVL III: CPT | Mod: PBBFAC,,, | Performed by: INTERNAL MEDICINE

## 2020-12-23 PROCEDURE — 99214 OFFICE O/P EST MOD 30 MIN: CPT | Mod: S$GLB,,, | Performed by: INTERNAL MEDICINE

## 2020-12-23 PROCEDURE — 3079F PR MOST RECENT DIASTOLIC BLOOD PRESSURE 80-89 MM HG: ICD-10-PCS | Mod: CPTII,S$GLB,, | Performed by: INTERNAL MEDICINE

## 2020-12-23 PROCEDURE — 3008F PR BODY MASS INDEX (BMI) DOCUMENTED: ICD-10-PCS | Mod: CPTII,S$GLB,, | Performed by: INTERNAL MEDICINE

## 2020-12-23 PROCEDURE — 3008F BODY MASS INDEX DOCD: CPT | Mod: CPTII,S$GLB,, | Performed by: INTERNAL MEDICINE

## 2020-12-23 PROCEDURE — 3077F PR MOST RECENT SYSTOLIC BLOOD PRESSURE >= 140 MM HG: ICD-10-PCS | Mod: CPTII,S$GLB,, | Performed by: INTERNAL MEDICINE

## 2020-12-23 NOTE — PROGRESS NOTES
Subjective:       Patient ID: Derek Joseph Weil is a 55 y.o. male.    Chief Complaint: No chief complaint on file.    F/u blood pressure    HPI: 54 y/o w/ HTN presents for follow up. During our visit patient called wife to help clarify his current medicaitons. Taking 5mg amlodipine and 5mg bystolic once daily and one half table to dyazide once daily. He has chronic low back pain and history of left foot drop treated with spinal surgery > 5 years ago (Dr. Lopez of ortho) strenght slowly improving in lower leg but still feels ankle is unstable when walking on uneven surfaces no LE swelling.     Family history of diabetes in parents.     Review of Systems   Constitutional: Positive for fatigue. Negative for activity change, appetite change, fever and unexpected weight change.   HENT: Negative for ear pain, rhinorrhea and sore throat.    Eyes: Negative for discharge and visual disturbance.   Respiratory: Negative for chest tightness, shortness of breath and wheezing.    Cardiovascular: Negative for chest pain, palpitations and leg swelling.   Gastrointestinal: Negative for abdominal pain, constipation and diarrhea.   Endocrine: Negative for cold intolerance and heat intolerance.   Genitourinary: Negative for dysuria and hematuria.   Musculoskeletal: Positive for back pain. Negative for joint swelling and neck stiffness.   Skin: Negative for rash.   Neurological: Negative for dizziness, syncope, weakness and headaches.   Psychiatric/Behavioral: Negative for suicidal ideas.       Objective:     Vitals:    12/23/20 1459   BP: (!) 144/83   BP Location: Right arm   Patient Position: Sitting   BP Method: Medium (Automatic)   Pulse: 71   Resp: 16   Temp: 98.4 °F (36.9 °C)   TempSrc: Oral   SpO2: 96%   Weight: 113 kg (249 lb 1.9 oz)          Physical Exam  Constitutional:       Appearance: He is well-developed.   HENT:      Head: Normocephalic and atraumatic.   Eyes:      General: No scleral icterus.     Conjunctiva/sclera:  Conjunctivae normal.   Neck:      Musculoskeletal: Normal range of motion.   Cardiovascular:      Rate and Rhythm: Normal rate and regular rhythm.      Heart sounds: No murmur. No friction rub. No gallop.    Pulmonary:      Effort: Pulmonary effort is normal.      Breath sounds: Normal breath sounds. No wheezing or rales.   Abdominal:      Palpations: Abdomen is soft.      Tenderness: There is no abdominal tenderness. There is no guarding or rebound.   Musculoskeletal: Normal range of motion.         General: No tenderness.      Right lower leg: No edema.      Left lower leg: No edema.   Skin:     General: Skin is warm and dry.   Neurological:      Mental Status: He is alert and oriented to person, place, and time.      Cranial Nerves: No cranial nerve deficit.   Psychiatric:         Mood and Affect: Mood normal.         Behavior: Behavior normal.         Thought Content: Thought content normal.         Assessment and Plan   1. Essential hypertension  Above goal increase thiazide to full tablet once daily repeat potassium prior to follow up  - CBC Auto Differential; Future  - Basic Metabolic Panel; Future    2. Elevated random blood glucose level  Screen for DM with a1c   - Hemoglobin A1C; Future    3. Erectile dysfunction, unspecified erectile dysfunction type  Wife requests evaluation for ED for many years  - Ambulatory referral/consult to Urology; Future

## 2020-12-30 ENCOUNTER — PATIENT OUTREACH (OUTPATIENT)
Dept: ADMINISTRATIVE | Facility: OTHER | Age: 55
End: 2020-12-30

## 2020-12-30 ENCOUNTER — OFFICE VISIT (OUTPATIENT)
Dept: UROLOGY | Facility: CLINIC | Age: 55
End: 2020-12-30
Payer: COMMERCIAL

## 2020-12-30 VITALS — BODY MASS INDEX: 35.08 KG/M2 | HEIGHT: 71 IN | WEIGHT: 250.56 LBS

## 2020-12-30 DIAGNOSIS — N52.9 ERECTILE DYSFUNCTION, UNSPECIFIED ERECTILE DYSFUNCTION TYPE: ICD-10-CM

## 2020-12-30 PROCEDURE — 99203 PR OFFICE/OUTPT VISIT, NEW, LEVL III, 30-44 MIN: ICD-10-PCS | Mod: S$GLB,,, | Performed by: STUDENT IN AN ORGANIZED HEALTH CARE EDUCATION/TRAINING PROGRAM

## 2020-12-30 PROCEDURE — 99999 PR PBB SHADOW E&M-EST. PATIENT-LVL III: ICD-10-PCS | Mod: PBBFAC,,, | Performed by: STUDENT IN AN ORGANIZED HEALTH CARE EDUCATION/TRAINING PROGRAM

## 2020-12-30 PROCEDURE — 3008F BODY MASS INDEX DOCD: CPT | Mod: CPTII,S$GLB,, | Performed by: STUDENT IN AN ORGANIZED HEALTH CARE EDUCATION/TRAINING PROGRAM

## 2020-12-30 PROCEDURE — 99203 OFFICE O/P NEW LOW 30 MIN: CPT | Mod: S$GLB,,, | Performed by: STUDENT IN AN ORGANIZED HEALTH CARE EDUCATION/TRAINING PROGRAM

## 2020-12-30 PROCEDURE — 1126F PR PAIN SEVERITY QUANTIFIED, NO PAIN PRESENT: ICD-10-PCS | Mod: S$GLB,,, | Performed by: STUDENT IN AN ORGANIZED HEALTH CARE EDUCATION/TRAINING PROGRAM

## 2020-12-30 PROCEDURE — 99999 PR PBB SHADOW E&M-EST. PATIENT-LVL III: CPT | Mod: PBBFAC,,, | Performed by: STUDENT IN AN ORGANIZED HEALTH CARE EDUCATION/TRAINING PROGRAM

## 2020-12-30 PROCEDURE — 3008F PR BODY MASS INDEX (BMI) DOCUMENTED: ICD-10-PCS | Mod: CPTII,S$GLB,, | Performed by: STUDENT IN AN ORGANIZED HEALTH CARE EDUCATION/TRAINING PROGRAM

## 2020-12-30 PROCEDURE — 1126F AMNT PAIN NOTED NONE PRSNT: CPT | Mod: S$GLB,,, | Performed by: STUDENT IN AN ORGANIZED HEALTH CARE EDUCATION/TRAINING PROGRAM

## 2020-12-30 NOTE — LETTER
December 30, 2020      Monty Pena MD  [address] [facility] [place] [address]           [facility]  [address]  [facility] [place] [address]  Phone: [phone]  Fax: [phone]          Patient: Derek Joseph Weil   MR Number: [id number]   YOB: 1965   Date of Visit: 12/30/2020       Dear Dr. Monty Pena:    Thank you for referring Derek Weil to me for evaluation. Attached you will find relevant portions of my assessment and plan of care.    If you have questions, please do not hesitate to call me. I look forward to following Derek Weil along with you.    Sincerely,    Serafin Neff MD    Enclosure  CC:  No Recipients    If you would like to receive this communication electronically, please contact [email or web address] or [phone] to request more information on [facility] Link access.    For providers and/or their staff who would like to refer a patient to [facility], please contact us through our one-stop-shop provider referral line, [facility], at [phone].    If you feel you have received this communication in error or would no longer like to receive these types of communications, please e-mail [email or web address]

## 2020-12-30 NOTE — PROGRESS NOTES
Health Maintenance Due   Topic Date Due    HIV Screening  11/19/1978    TETANUS VACCINE  11/19/1981    Shingles Vaccine (1 of 2) 11/19/2013    Influenza Vaccine (1) 08/01/2020     Updates were requested from care everywhere.  Chart was reviewed for overdue Proactive [facility] Encounters (SAKINA) topics (CRS, Breast Cancer Screening, Eye exam)  Health Maintenance has been updated.  LINKS immunization registry triggered.  Immunizations were reconciled.

## 2020-12-30 NOTE — PROGRESS NOTES
Patient ID: Derek Joseph Weil is a 55 y.o. male.    Chief Complaint: Erectile Dysfunction (new pt referred by Dr. Pena for ED issues )    Referral Monty Pena MD   HPI-   54 yo man w/ 3 year hx of ED which began with failure to maintain erections after orgasm and progressed to inability to initiate an erection. His ANA MARIA (8) very low confidence to achieve an erection. He presents w/ his wife who provides part of the history. Patient has not taken any medication to improve his erection. He is very fearful of another drug reaction.  Last A1C on file 5.6 ( 2/2020), pending A1C from PCP due to elevated random blood surgar  Risk factors for ED elevated BMI, HTN  PSH: appendectomy    ROS  Review of Systems   Constitutional: Negative for chills, diaphoresis, fatigue and fever.   HENT: Negative for congestion, rhinorrhea and sore throat.    Eyes: Negative for discharge and visual disturbance.   Respiratory: Negative for cough, chest tightness, shortness of breath and wheezing.    Cardiovascular: Negative for chest pain and leg swelling.   Gastrointestinal: Negative for abdominal distention, abdominal pain, blood in stool, constipation, diarrhea, nausea and vomiting.   Endocrine: Negative for polyuria.   Genitourinary: Negative for penile swelling and scrotal swelling.   Musculoskeletal: Negative for back pain and gait problem.   Skin: Negative for color change, rash and wound.   Allergic/Immunologic: Negative for immunocompromised state.   Neurological: Negative for light-headedness and headaches.   Psychiatric/Behavioral: Negative for confusion. The patient is not nervous/anxious.          Past Medical History  Active Ambulatory Problems     Diagnosis Date Noted    Hypertension 11/24/2012    Obesity, Class II, BMI 35-39.9, with comorbidity 02/11/2016    Hypercholesteremia 02/11/2016    Anaphylaxis     Pneumonia due to COVID-19 virus     MARISA on CPAP      Resolved Ambulatory Problems     Diagnosis Date Noted     Dizziness 11/24/2012    Pneumonia of both lower lobes due to infectious organism 11/02/2020    Hypoxia      No Additional Past Medical History         Past Surgical History  Past Surgical History:   Procedure Laterality Date    APPENDECTOMY         Social History  Relationships   Social connections    Talks on phone: Not on file    Gets together: Not on file    Attends Jain service: Not on file    Active member of club or organization: Not on file    Attends meetings of clubs or organizations: Not on file    Relationship status: Not on file       Medications    Current Outpatient Medications:     atorvastatin (LIPITOR) 10 MG tablet, , Disp: , Rfl:     triamterene-hydrochlorothiazide 37.5-25 mg (DYAZIDE) 37.5-25 mg per capsule, Take 1 capsule by mouth every morning., Disp: 90 capsule, Rfl: 3    Allergies  Review of patient's allergies indicates:   Allergen Reactions    Nsaids (non-steroidal anti-inflammatory drug) Anaphylaxis    Penicillins Anaphylaxis     Other reaction(s): Swelling    Aspirin Swelling     Other reaction(s): Swelling of throat, hives    Ibuprofen Hives     Other reaction(s): Swelling of throat, hives    Phenytoin sodium extended      Other reaction(s): Swelling       Patient's PMH, FH, Social hx, Medications, allergies reviewed and updated as pertinent to today's visit    Objective:      Physical Exam  Vitals signs reviewed.   Constitutional:       Appearance: He is well-developed.   HENT:      Head: Normocephalic and atraumatic.      Nose: No rhinorrhea.   Eyes:      Conjunctiva/sclera: Conjunctivae normal.   Neck:      Musculoskeletal: Neck supple.   Cardiovascular:      Rate and Rhythm: Normal rate and regular rhythm.   Pulmonary:      Effort: Pulmonary effort is normal. No respiratory distress.   Abdominal:      General: Abdomen is flat. There is no distension.   Musculoskeletal:         General: No deformity or signs of injury.   Skin:     Findings: No bruising or rash.    Neurological:      General: No focal deficit present.      Mental Status: He is alert and oriented to person, place, and time.   Psychiatric:         Mood and Affect: Mood normal.         Thought Content: Thought content normal.               Assessment:       1. Erectile dysfunction, unspecified erectile dysfunction type        Plan:           Risk factors for ED: elevated BMI- 34.9, HTN, HTN medications  .Discussed treatments for ED including PDE 5inhibitors, intracavernosal penile injections, penile prosthesis. Risk and benefits reviewed.     Patient would like to think about things further prior to considering medication.

## 2021-01-14 ENCOUNTER — PATIENT MESSAGE (OUTPATIENT)
Dept: UROLOGY | Facility: CLINIC | Age: 56
End: 2021-01-14

## 2021-01-14 DIAGNOSIS — N52.9 ERECTILE DYSFUNCTION, UNSPECIFIED ERECTILE DYSFUNCTION TYPE: Primary | ICD-10-CM

## 2021-01-14 RX ORDER — TADALAFIL 5 MG/1
5 TABLET ORAL DAILY PRN
Qty: 30 TABLET | Refills: 11 | Status: SHIPPED | OUTPATIENT
Start: 2021-01-14 | End: 2021-04-30

## 2021-01-15 ENCOUNTER — PATIENT OUTREACH (OUTPATIENT)
Dept: ADMINISTRATIVE | Facility: HOSPITAL | Age: 56
End: 2021-01-15

## 2021-01-20 ENCOUNTER — PATIENT MESSAGE (OUTPATIENT)
Dept: FAMILY MEDICINE | Facility: CLINIC | Age: 56
End: 2021-01-20

## 2021-01-20 DIAGNOSIS — R41.840 POOR CONCENTRATION: Primary | ICD-10-CM

## 2021-01-24 ENCOUNTER — PATIENT MESSAGE (OUTPATIENT)
Dept: FAMILY MEDICINE | Facility: CLINIC | Age: 56
End: 2021-01-24

## 2021-01-24 DIAGNOSIS — R06.02 SHORTNESS OF BREATH WITH EXPOSURE TO COVID-19 VIRUS: Primary | ICD-10-CM

## 2021-01-24 DIAGNOSIS — Z20.822 SHORTNESS OF BREATH WITH EXPOSURE TO COVID-19 VIRUS: Primary | ICD-10-CM

## 2021-01-26 ENCOUNTER — PATIENT MESSAGE (OUTPATIENT)
Dept: UROLOGY | Facility: CLINIC | Age: 56
End: 2021-01-26

## 2021-01-26 ENCOUNTER — LAB VISIT (OUTPATIENT)
Dept: LAB | Facility: HOSPITAL | Age: 56
End: 2021-01-26
Attending: INTERNAL MEDICINE
Payer: COMMERCIAL

## 2021-01-26 DIAGNOSIS — R06.02 SHORTNESS OF BREATH WITH EXPOSURE TO COVID-19 VIRUS: ICD-10-CM

## 2021-01-26 DIAGNOSIS — Z20.822 SHORTNESS OF BREATH WITH EXPOSURE TO COVID-19 VIRUS: ICD-10-CM

## 2021-01-26 DIAGNOSIS — R73.09 ELEVATED RANDOM BLOOD GLUCOSE LEVEL: ICD-10-CM

## 2021-01-26 DIAGNOSIS — I10 ESSENTIAL HYPERTENSION: ICD-10-CM

## 2021-01-26 LAB
ANION GAP SERPL CALC-SCNC: 9 MMOL/L (ref 8–16)
BASOPHILS # BLD AUTO: 0.06 K/UL (ref 0–0.2)
BASOPHILS NFR BLD: 0.8 % (ref 0–1.9)
BUN SERPL-MCNC: 16 MG/DL (ref 6–20)
CALCIUM SERPL-MCNC: 9.4 MG/DL (ref 8.7–10.5)
CHLORIDE SERPL-SCNC: 102 MMOL/L (ref 95–110)
CO2 SERPL-SCNC: 28 MMOL/L (ref 23–29)
CREAT SERPL-MCNC: 1.2 MG/DL (ref 0.5–1.4)
DIFFERENTIAL METHOD: ABNORMAL
EOSINOPHIL # BLD AUTO: 0.3 K/UL (ref 0–0.5)
EOSINOPHIL NFR BLD: 3.1 % (ref 0–8)
ERYTHROCYTE [DISTWIDTH] IN BLOOD BY AUTOMATED COUNT: 13 % (ref 11.5–14.5)
EST. GFR  (AFRICAN AMERICAN): >60 ML/MIN/1.73 M^2
EST. GFR  (NON AFRICAN AMERICAN): >60 ML/MIN/1.73 M^2
GLUCOSE SERPL-MCNC: 106 MG/DL (ref 70–110)
HCT VFR BLD AUTO: 45.5 % (ref 40–54)
HGB BLD-MCNC: 15.6 G/DL (ref 14–18)
IMM GRANULOCYTES # BLD AUTO: 0.02 K/UL (ref 0–0.04)
IMM GRANULOCYTES NFR BLD AUTO: 0.3 % (ref 0–0.5)
LYMPHOCYTES # BLD AUTO: 1.8 K/UL (ref 1–4.8)
LYMPHOCYTES NFR BLD: 22 % (ref 18–48)
MCH RBC QN AUTO: 31.3 PG (ref 27–31)
MCHC RBC AUTO-ENTMCNC: 34.3 G/DL (ref 32–36)
MCV RBC AUTO: 91 FL (ref 82–98)
MONOCYTES # BLD AUTO: 0.8 K/UL (ref 0.3–1)
MONOCYTES NFR BLD: 10.5 % (ref 4–15)
NEUTROPHILS # BLD AUTO: 5.1 K/UL (ref 1.8–7.7)
NEUTROPHILS NFR BLD: 63.3 % (ref 38–73)
NRBC BLD-RTO: 0 /100 WBC
PLATELET # BLD AUTO: 186 K/UL (ref 150–350)
PMV BLD AUTO: 10 FL (ref 9.2–12.9)
POTASSIUM SERPL-SCNC: 4.3 MMOL/L (ref 3.5–5.1)
RBC # BLD AUTO: 4.99 M/UL (ref 4.6–6.2)
SARS-COV-2 IGG SERPLBLD QL IA.RAPID: POSITIVE
SODIUM SERPL-SCNC: 139 MMOL/L (ref 136–145)
WBC # BLD AUTO: 7.97 K/UL (ref 3.9–12.7)

## 2021-01-26 PROCEDURE — 86769 SARS-COV-2 COVID-19 ANTIBODY: CPT

## 2021-01-26 PROCEDURE — 80048 BASIC METABOLIC PNL TOTAL CA: CPT

## 2021-01-26 PROCEDURE — 36415 COLL VENOUS BLD VENIPUNCTURE: CPT | Mod: PN

## 2021-01-26 PROCEDURE — 83036 HEMOGLOBIN GLYCOSYLATED A1C: CPT

## 2021-01-26 PROCEDURE — 85025 COMPLETE CBC W/AUTO DIFF WBC: CPT

## 2021-01-27 LAB
ESTIMATED AVG GLUCOSE: 111 MG/DL (ref 68–131)
HBA1C MFR BLD HPLC: 5.5 % (ref 4–5.6)

## 2021-01-29 ENCOUNTER — PATIENT MESSAGE (OUTPATIENT)
Dept: ADMINISTRATIVE | Facility: HOSPITAL | Age: 56
End: 2021-01-29

## 2021-01-29 ENCOUNTER — OFFICE VISIT (OUTPATIENT)
Dept: FAMILY MEDICINE | Facility: CLINIC | Age: 56
End: 2021-01-29
Payer: COMMERCIAL

## 2021-01-29 VITALS
BODY MASS INDEX: 34.59 KG/M2 | DIASTOLIC BLOOD PRESSURE: 84 MMHG | HEART RATE: 68 BPM | SYSTOLIC BLOOD PRESSURE: 140 MMHG | WEIGHT: 248 LBS | OXYGEN SATURATION: 97 %

## 2021-01-29 DIAGNOSIS — I10 ESSENTIAL HYPERTENSION: Primary | ICD-10-CM

## 2021-01-29 DIAGNOSIS — G47.33 OSA ON CPAP: ICD-10-CM

## 2021-01-29 PROCEDURE — 3077F SYST BP >= 140 MM HG: CPT | Mod: CPTII,S$GLB,, | Performed by: INTERNAL MEDICINE

## 2021-01-29 PROCEDURE — 3079F DIAST BP 80-89 MM HG: CPT | Mod: CPTII,S$GLB,, | Performed by: INTERNAL MEDICINE

## 2021-01-29 PROCEDURE — 99214 PR OFFICE/OUTPT VISIT, EST, LEVL IV, 30-39 MIN: ICD-10-PCS | Mod: S$GLB,,, | Performed by: INTERNAL MEDICINE

## 2021-01-29 PROCEDURE — 99214 OFFICE O/P EST MOD 30 MIN: CPT | Mod: S$GLB,,, | Performed by: INTERNAL MEDICINE

## 2021-01-29 PROCEDURE — 3008F BODY MASS INDEX DOCD: CPT | Mod: CPTII,S$GLB,, | Performed by: INTERNAL MEDICINE

## 2021-01-29 PROCEDURE — 3008F PR BODY MASS INDEX (BMI) DOCUMENTED: ICD-10-PCS | Mod: CPTII,S$GLB,, | Performed by: INTERNAL MEDICINE

## 2021-01-29 PROCEDURE — 99999 PR PBB SHADOW E&M-EST. PATIENT-LVL III: ICD-10-PCS | Mod: PBBFAC,,, | Performed by: INTERNAL MEDICINE

## 2021-01-29 PROCEDURE — 3079F PR MOST RECENT DIASTOLIC BLOOD PRESSURE 80-89 MM HG: ICD-10-PCS | Mod: CPTII,S$GLB,, | Performed by: INTERNAL MEDICINE

## 2021-01-29 PROCEDURE — 99999 PR PBB SHADOW E&M-EST. PATIENT-LVL III: CPT | Mod: PBBFAC,,, | Performed by: INTERNAL MEDICINE

## 2021-01-29 PROCEDURE — 3077F PR MOST RECENT SYSTOLIC BLOOD PRESSURE >= 140 MM HG: ICD-10-PCS | Mod: CPTII,S$GLB,, | Performed by: INTERNAL MEDICINE

## 2021-02-02 ENCOUNTER — TELEPHONE (OUTPATIENT)
Dept: ADMINISTRATIVE | Facility: HOSPITAL | Age: 56
End: 2021-02-02

## 2021-02-19 ENCOUNTER — PATIENT OUTREACH (OUTPATIENT)
Dept: ADMINISTRATIVE | Facility: OTHER | Age: 56
End: 2021-02-19

## 2021-02-22 ENCOUNTER — OFFICE VISIT (OUTPATIENT)
Dept: PULMONOLOGY | Facility: CLINIC | Age: 56
End: 2021-02-22
Payer: COMMERCIAL

## 2021-02-22 VITALS
HEIGHT: 71 IN | BODY MASS INDEX: 35.04 KG/M2 | HEART RATE: 61 BPM | OXYGEN SATURATION: 95 % | DIASTOLIC BLOOD PRESSURE: 89 MMHG | SYSTOLIC BLOOD PRESSURE: 155 MMHG | WEIGHT: 250.31 LBS

## 2021-02-22 DIAGNOSIS — G47.33 OSA ON CPAP: Primary | ICD-10-CM

## 2021-02-22 PROCEDURE — 99203 PR OFFICE/OUTPT VISIT, NEW, LEVL III, 30-44 MIN: ICD-10-PCS | Mod: S$GLB,,, | Performed by: NURSE PRACTITIONER

## 2021-02-22 PROCEDURE — 3079F DIAST BP 80-89 MM HG: CPT | Mod: CPTII,S$GLB,, | Performed by: NURSE PRACTITIONER

## 2021-02-22 PROCEDURE — 3008F PR BODY MASS INDEX (BMI) DOCUMENTED: ICD-10-PCS | Mod: CPTII,S$GLB,, | Performed by: NURSE PRACTITIONER

## 2021-02-22 PROCEDURE — 1126F PR PAIN SEVERITY QUANTIFIED, NO PAIN PRESENT: ICD-10-PCS | Mod: S$GLB,,, | Performed by: NURSE PRACTITIONER

## 2021-02-22 PROCEDURE — 3008F BODY MASS INDEX DOCD: CPT | Mod: CPTII,S$GLB,, | Performed by: NURSE PRACTITIONER

## 2021-02-22 PROCEDURE — 3077F SYST BP >= 140 MM HG: CPT | Mod: CPTII,S$GLB,, | Performed by: NURSE PRACTITIONER

## 2021-02-22 PROCEDURE — 1126F AMNT PAIN NOTED NONE PRSNT: CPT | Mod: S$GLB,,, | Performed by: NURSE PRACTITIONER

## 2021-02-22 PROCEDURE — 99999 PR PBB SHADOW E&M-EST. PATIENT-LVL IV: CPT | Mod: PBBFAC,,, | Performed by: NURSE PRACTITIONER

## 2021-02-22 PROCEDURE — 99999 PR PBB SHADOW E&M-EST. PATIENT-LVL IV: ICD-10-PCS | Mod: PBBFAC,,, | Performed by: NURSE PRACTITIONER

## 2021-02-22 PROCEDURE — 3077F PR MOST RECENT SYSTOLIC BLOOD PRESSURE >= 140 MM HG: ICD-10-PCS | Mod: CPTII,S$GLB,, | Performed by: NURSE PRACTITIONER

## 2021-02-22 PROCEDURE — 3079F PR MOST RECENT DIASTOLIC BLOOD PRESSURE 80-89 MM HG: ICD-10-PCS | Mod: CPTII,S$GLB,, | Performed by: NURSE PRACTITIONER

## 2021-02-22 PROCEDURE — 99203 OFFICE O/P NEW LOW 30 MIN: CPT | Mod: S$GLB,,, | Performed by: NURSE PRACTITIONER

## 2021-02-22 RX ORDER — NEBIVOLOL HYDROCHLORIDE 5 MG/1
5 TABLET ORAL DAILY
COMMUNITY
Start: 2021-02-07 | End: 2021-04-30 | Stop reason: SDUPTHER

## 2021-02-26 ENCOUNTER — PATIENT MESSAGE (OUTPATIENT)
Dept: FAMILY MEDICINE | Facility: CLINIC | Age: 56
End: 2021-02-26

## 2021-03-01 ENCOUNTER — TELEPHONE (OUTPATIENT)
Dept: FAMILY MEDICINE | Facility: CLINIC | Age: 56
End: 2021-03-01

## 2021-04-15 ENCOUNTER — PATIENT MESSAGE (OUTPATIENT)
Dept: RESEARCH | Facility: HOSPITAL | Age: 56
End: 2021-04-15

## 2021-04-27 ENCOUNTER — PATIENT MESSAGE (OUTPATIENT)
Dept: FAMILY MEDICINE | Facility: CLINIC | Age: 56
End: 2021-04-27

## 2021-04-30 ENCOUNTER — OFFICE VISIT (OUTPATIENT)
Dept: FAMILY MEDICINE | Facility: CLINIC | Age: 56
End: 2021-04-30
Payer: COMMERCIAL

## 2021-04-30 VITALS
RESPIRATION RATE: 18 BRPM | SYSTOLIC BLOOD PRESSURE: 132 MMHG | HEIGHT: 71 IN | OXYGEN SATURATION: 95 % | WEIGHT: 252.44 LBS | HEART RATE: 70 BPM | BODY MASS INDEX: 35.34 KG/M2 | DIASTOLIC BLOOD PRESSURE: 80 MMHG | TEMPERATURE: 98 F

## 2021-04-30 DIAGNOSIS — E78.2 MIXED HYPERLIPIDEMIA: ICD-10-CM

## 2021-04-30 DIAGNOSIS — R73.01 IMPAIRED FASTING GLUCOSE: ICD-10-CM

## 2021-04-30 DIAGNOSIS — I10 ESSENTIAL HYPERTENSION: ICD-10-CM

## 2021-04-30 DIAGNOSIS — G47.33 OSA ON CPAP: Primary | ICD-10-CM

## 2021-04-30 PROCEDURE — 1126F PR PAIN SEVERITY QUANTIFIED, NO PAIN PRESENT: ICD-10-PCS | Mod: S$GLB,,, | Performed by: INTERNAL MEDICINE

## 2021-04-30 PROCEDURE — 99999 PR PBB SHADOW E&M-EST. PATIENT-LVL III: ICD-10-PCS | Mod: PBBFAC,,, | Performed by: INTERNAL MEDICINE

## 2021-04-30 PROCEDURE — 1126F AMNT PAIN NOTED NONE PRSNT: CPT | Mod: S$GLB,,, | Performed by: INTERNAL MEDICINE

## 2021-04-30 PROCEDURE — 99214 OFFICE O/P EST MOD 30 MIN: CPT | Mod: S$GLB,,, | Performed by: INTERNAL MEDICINE

## 2021-04-30 PROCEDURE — 3008F BODY MASS INDEX DOCD: CPT | Mod: CPTII,S$GLB,, | Performed by: INTERNAL MEDICINE

## 2021-04-30 PROCEDURE — 99999 PR PBB SHADOW E&M-EST. PATIENT-LVL III: CPT | Mod: PBBFAC,,, | Performed by: INTERNAL MEDICINE

## 2021-04-30 PROCEDURE — 99214 PR OFFICE/OUTPT VISIT, EST, LEVL IV, 30-39 MIN: ICD-10-PCS | Mod: S$GLB,,, | Performed by: INTERNAL MEDICINE

## 2021-04-30 PROCEDURE — 3008F PR BODY MASS INDEX (BMI) DOCUMENTED: ICD-10-PCS | Mod: CPTII,S$GLB,, | Performed by: INTERNAL MEDICINE

## 2021-04-30 RX ORDER — TRIAMTERENE AND HYDROCHLOROTHIAZIDE 37.5; 25 MG/1; MG/1
1 CAPSULE ORAL EVERY MORNING
Qty: 90 CAPSULE | Refills: 3 | Status: SHIPPED | OUTPATIENT
Start: 2021-04-30 | End: 2022-02-21

## 2021-04-30 RX ORDER — NEBIVOLOL 5 MG/1
5 TABLET ORAL DAILY
Qty: 90 TABLET | Refills: 3 | Status: SHIPPED | OUTPATIENT
Start: 2021-04-30 | End: 2022-02-23 | Stop reason: SDUPTHER

## 2021-04-30 RX ORDER — ATORVASTATIN CALCIUM 10 MG/1
10 TABLET, FILM COATED ORAL NIGHTLY
Qty: 90 TABLET | Refills: 3 | Status: SHIPPED | OUTPATIENT
Start: 2021-04-30 | End: 2022-02-23 | Stop reason: SDUPTHER

## 2021-08-15 ENCOUNTER — PATIENT MESSAGE (OUTPATIENT)
Dept: FAMILY MEDICINE | Facility: CLINIC | Age: 56
End: 2021-08-15

## 2021-08-16 ENCOUNTER — TELEPHONE (OUTPATIENT)
Dept: PULMONOLOGY | Facility: CLINIC | Age: 56
End: 2021-08-16

## 2021-08-19 ENCOUNTER — PATIENT MESSAGE (OUTPATIENT)
Dept: FAMILY MEDICINE | Facility: CLINIC | Age: 56
End: 2021-08-19

## 2021-08-19 DIAGNOSIS — I10 ESSENTIAL HYPERTENSION: ICD-10-CM

## 2021-08-19 DIAGNOSIS — E78.00 HYPERCHOLESTEREMIA: ICD-10-CM

## 2021-08-19 DIAGNOSIS — Z01.84 ENCOUNTER FOR ANTIBODY RESPONSE EXAMINATION: Primary | ICD-10-CM

## 2021-08-21 LAB
ALBUMIN SERPL-MCNC: 4.6 G/DL (ref 3.8–4.9)
ALBUMIN/GLOB SERPL: 1.8 {RATIO} (ref 1.2–2.2)
ALP SERPL-CCNC: 71 IU/L (ref 48–121)
ALT SERPL-CCNC: 57 IU/L (ref 0–44)
AST SERPL-CCNC: 44 IU/L (ref 0–40)
BASOPHILS # BLD AUTO: 0.1 X10E3/UL (ref 0–0.2)
BASOPHILS NFR BLD AUTO: 1 %
BILIRUB SERPL-MCNC: 0.6 MG/DL (ref 0–1.2)
BUN SERPL-MCNC: 17 MG/DL (ref 6–24)
BUN/CREAT SERPL: 15 (ref 9–20)
CALCIUM SERPL-MCNC: 9.4 MG/DL (ref 8.7–10.2)
CHLORIDE SERPL-SCNC: 101 MMOL/L (ref 96–106)
CHOLEST SERPL-MCNC: 180 MG/DL (ref 100–199)
CO2 SERPL-SCNC: 25 MMOL/L (ref 20–29)
CREAT SERPL-MCNC: 1.13 MG/DL (ref 0.76–1.27)
EOSINOPHIL # BLD AUTO: 0.2 X10E3/UL (ref 0–0.4)
EOSINOPHIL NFR BLD AUTO: 3 %
ERYTHROCYTE [DISTWIDTH] IN BLOOD BY AUTOMATED COUNT: 13.2 % (ref 11.6–15.4)
GLOBULIN SER CALC-MCNC: 2.6 G/DL (ref 1.5–4.5)
GLUCOSE SERPL-MCNC: 109 MG/DL (ref 65–99)
HCT VFR BLD AUTO: 48.3 % (ref 37.5–51)
HDLC SERPL-MCNC: 40 MG/DL
HGB BLD-MCNC: 16.2 G/DL (ref 13–17.7)
IMM GRANULOCYTES # BLD AUTO: 0 X10E3/UL (ref 0–0.1)
IMM GRANULOCYTES NFR BLD AUTO: 0 %
LDLC SERPL CALC-MCNC: 112 MG/DL (ref 0–99)
LYMPHOCYTES # BLD AUTO: 1.8 X10E3/UL (ref 0.7–3.1)
LYMPHOCYTES NFR BLD AUTO: 23 %
MCH RBC QN AUTO: 31.5 PG (ref 26.6–33)
MCHC RBC AUTO-ENTMCNC: 33.5 G/DL (ref 31.5–35.7)
MCV RBC AUTO: 94 FL (ref 79–97)
MONOCYTES # BLD AUTO: 0.8 X10E3/UL (ref 0.1–0.9)
MONOCYTES NFR BLD AUTO: 10 %
NEUTROPHILS # BLD AUTO: 4.8 X10E3/UL (ref 1.4–7)
NEUTROPHILS NFR BLD AUTO: 63 %
PLATELET # BLD AUTO: 214 X10E3/UL (ref 150–450)
POTASSIUM SERPL-SCNC: 4.3 MMOL/L (ref 3.5–5.2)
PROT SERPL-MCNC: 7.2 G/DL (ref 6–8.5)
RBC # BLD AUTO: 5.14 X10E6/UL (ref 4.14–5.8)
SODIUM SERPL-SCNC: 141 MMOL/L (ref 134–144)
TRIGL SERPL-MCNC: 159 MG/DL (ref 0–149)
VLDLC SERPL CALC-MCNC: 28 MG/DL (ref 5–40)
WBC # BLD AUTO: 7.7 X10E3/UL (ref 3.4–10.8)

## 2021-08-22 LAB — SARS-COV-2 IGG SERPL QL IA: POSITIVE

## 2021-09-24 ENCOUNTER — PATIENT MESSAGE (OUTPATIENT)
Dept: FAMILY MEDICINE | Facility: CLINIC | Age: 56
End: 2021-09-24

## 2021-09-24 ENCOUNTER — TELEPHONE (OUTPATIENT)
Dept: FAMILY MEDICINE | Facility: CLINIC | Age: 56
End: 2021-09-24

## 2021-09-29 ENCOUNTER — OFFICE VISIT (OUTPATIENT)
Dept: FAMILY MEDICINE | Facility: CLINIC | Age: 56
End: 2021-09-29
Payer: COMMERCIAL

## 2021-09-29 VITALS
HEART RATE: 68 BPM | BODY MASS INDEX: 35.65 KG/M2 | DIASTOLIC BLOOD PRESSURE: 86 MMHG | HEIGHT: 71 IN | OXYGEN SATURATION: 96 % | WEIGHT: 254.63 LBS | TEMPERATURE: 98 F | SYSTOLIC BLOOD PRESSURE: 132 MMHG

## 2021-09-29 DIAGNOSIS — J18.9 ATYPICAL PNEUMONIA: ICD-10-CM

## 2021-09-29 DIAGNOSIS — J30.89 NON-SEASONAL ALLERGIC RHINITIS DUE TO OTHER ALLERGIC TRIGGER: Primary | ICD-10-CM

## 2021-09-29 DIAGNOSIS — R05.3 CHRONIC COUGH: Primary | ICD-10-CM

## 2021-09-29 DIAGNOSIS — I10 ESSENTIAL HYPERTENSION: ICD-10-CM

## 2021-09-29 DIAGNOSIS — G47.33 OSA ON CPAP: ICD-10-CM

## 2021-09-29 PROCEDURE — 3044F PR MOST RECENT HEMOGLOBIN A1C LEVEL <7.0%: ICD-10-PCS | Mod: CPTII,S$GLB,, | Performed by: INTERNAL MEDICINE

## 2021-09-29 PROCEDURE — 1159F PR MEDICATION LIST DOCUMENTED IN MEDICAL RECORD: ICD-10-PCS | Mod: CPTII,S$GLB,, | Performed by: INTERNAL MEDICINE

## 2021-09-29 PROCEDURE — 1160F PR REVIEW ALL MEDS BY PRESCRIBER/CLIN PHARMACIST DOCUMENTED: ICD-10-PCS | Mod: CPTII,S$GLB,, | Performed by: INTERNAL MEDICINE

## 2021-09-29 PROCEDURE — 3075F PR MOST RECENT SYSTOLIC BLOOD PRESS GE 130-139MM HG: ICD-10-PCS | Mod: CPTII,S$GLB,, | Performed by: INTERNAL MEDICINE

## 2021-09-29 PROCEDURE — 3079F PR MOST RECENT DIASTOLIC BLOOD PRESSURE 80-89 MM HG: ICD-10-PCS | Mod: CPTII,S$GLB,, | Performed by: INTERNAL MEDICINE

## 2021-09-29 PROCEDURE — 3044F HG A1C LEVEL LT 7.0%: CPT | Mod: CPTII,S$GLB,, | Performed by: INTERNAL MEDICINE

## 2021-09-29 PROCEDURE — 99214 OFFICE O/P EST MOD 30 MIN: CPT | Mod: S$GLB,,, | Performed by: INTERNAL MEDICINE

## 2021-09-29 PROCEDURE — 3075F SYST BP GE 130 - 139MM HG: CPT | Mod: CPTII,S$GLB,, | Performed by: INTERNAL MEDICINE

## 2021-09-29 PROCEDURE — 1160F RVW MEDS BY RX/DR IN RCRD: CPT | Mod: CPTII,S$GLB,, | Performed by: INTERNAL MEDICINE

## 2021-09-29 PROCEDURE — 99999 PR PBB SHADOW E&M-EST. PATIENT-LVL III: ICD-10-PCS | Mod: PBBFAC,,, | Performed by: INTERNAL MEDICINE

## 2021-09-29 PROCEDURE — 99999 PR PBB SHADOW E&M-EST. PATIENT-LVL III: CPT | Mod: PBBFAC,,, | Performed by: INTERNAL MEDICINE

## 2021-09-29 PROCEDURE — 3008F BODY MASS INDEX DOCD: CPT | Mod: CPTII,S$GLB,, | Performed by: INTERNAL MEDICINE

## 2021-09-29 PROCEDURE — 3079F DIAST BP 80-89 MM HG: CPT | Mod: CPTII,S$GLB,, | Performed by: INTERNAL MEDICINE

## 2021-09-29 PROCEDURE — 99214 PR OFFICE/OUTPT VISIT, EST, LEVL IV, 30-39 MIN: ICD-10-PCS | Mod: S$GLB,,, | Performed by: INTERNAL MEDICINE

## 2021-09-29 PROCEDURE — 1159F MED LIST DOCD IN RCRD: CPT | Mod: CPTII,S$GLB,, | Performed by: INTERNAL MEDICINE

## 2021-09-29 PROCEDURE — 3008F PR BODY MASS INDEX (BMI) DOCUMENTED: ICD-10-PCS | Mod: CPTII,S$GLB,, | Performed by: INTERNAL MEDICINE

## 2021-09-29 RX ORDER — AZITHROMYCIN 250 MG/1
TABLET, FILM COATED ORAL
Qty: 6 TABLET | Refills: 0 | Status: SHIPPED | OUTPATIENT
Start: 2021-09-29 | End: 2021-10-04

## 2021-09-29 RX ORDER — FLUTICASONE PROPIONATE 50 MCG
1 SPRAY, SUSPENSION (ML) NASAL 2 TIMES DAILY
Qty: 16 G | Refills: 1 | Status: SHIPPED | OUTPATIENT
Start: 2021-09-29

## 2021-10-05 ENCOUNTER — PATIENT MESSAGE (OUTPATIENT)
Dept: FAMILY MEDICINE | Facility: CLINIC | Age: 56
End: 2021-10-05

## 2021-10-05 DIAGNOSIS — R05.9 COUGH: Primary | ICD-10-CM

## 2021-10-06 ENCOUNTER — PATIENT MESSAGE (OUTPATIENT)
Dept: FAMILY MEDICINE | Facility: CLINIC | Age: 56
End: 2021-10-06

## 2021-10-06 RX ORDER — BENZONATATE 200 MG/1
200 CAPSULE ORAL 3 TIMES DAILY PRN
Qty: 30 CAPSULE | Refills: 0 | Status: SHIPPED | OUTPATIENT
Start: 2021-10-06 | End: 2021-10-16

## 2021-11-12 ENCOUNTER — PATIENT MESSAGE (OUTPATIENT)
Dept: FAMILY MEDICINE | Facility: CLINIC | Age: 56
End: 2021-11-12
Payer: COMMERCIAL

## 2021-11-12 DIAGNOSIS — R73.01 IMPAIRED FASTING GLUCOSE: ICD-10-CM

## 2021-11-12 DIAGNOSIS — M10.079 ACUTE IDIOPATHIC GOUT INVOLVING TOE, UNSPECIFIED LATERALITY: Primary | ICD-10-CM

## 2021-11-12 DIAGNOSIS — I10 ESSENTIAL HYPERTENSION: ICD-10-CM

## 2021-11-12 DIAGNOSIS — E78.2 MIXED HYPERLIPIDEMIA: ICD-10-CM

## 2021-11-14 RX ORDER — COLCHICINE 0.6 MG/1
TABLET ORAL
Qty: 15 TABLET | Refills: 0 | Status: SHIPPED | OUTPATIENT
Start: 2021-11-14

## 2022-01-04 ENCOUNTER — PATIENT MESSAGE (OUTPATIENT)
Dept: PULMONOLOGY | Facility: CLINIC | Age: 57
End: 2022-01-04
Payer: COMMERCIAL

## 2022-02-23 ENCOUNTER — OFFICE VISIT (OUTPATIENT)
Dept: FAMILY MEDICINE | Facility: CLINIC | Age: 57
End: 2022-02-23
Payer: COMMERCIAL

## 2022-02-23 VITALS
BODY MASS INDEX: 35.38 KG/M2 | TEMPERATURE: 98 F | WEIGHT: 253.63 LBS | OXYGEN SATURATION: 96 % | HEART RATE: 72 BPM | SYSTOLIC BLOOD PRESSURE: 132 MMHG | DIASTOLIC BLOOD PRESSURE: 78 MMHG

## 2022-02-23 DIAGNOSIS — G47.33 OSA ON CPAP: ICD-10-CM

## 2022-02-23 DIAGNOSIS — I10 PRIMARY HYPERTENSION: ICD-10-CM

## 2022-02-23 DIAGNOSIS — E78.00 HYPERCHOLESTEREMIA: ICD-10-CM

## 2022-02-23 DIAGNOSIS — M1A.9XX0 CHRONIC GOUT INVOLVING TOE WITHOUT TOPHUS, UNSPECIFIED CAUSE, UNSPECIFIED LATERALITY: ICD-10-CM

## 2022-02-23 DIAGNOSIS — Z00.00 ROUTINE GENERAL MEDICAL EXAMINATION AT A HEALTH CARE FACILITY: Primary | ICD-10-CM

## 2022-02-23 DIAGNOSIS — E78.2 MIXED HYPERLIPIDEMIA: ICD-10-CM

## 2022-02-23 DIAGNOSIS — I10 ESSENTIAL HYPERTENSION: ICD-10-CM

## 2022-02-23 PROCEDURE — 99396 PREV VISIT EST AGE 40-64: CPT | Mod: S$GLB,,, | Performed by: NURSE PRACTITIONER

## 2022-02-23 PROCEDURE — 3078F DIAST BP <80 MM HG: CPT | Mod: CPTII,S$GLB,, | Performed by: NURSE PRACTITIONER

## 2022-02-23 PROCEDURE — 1159F PR MEDICATION LIST DOCUMENTED IN MEDICAL RECORD: ICD-10-PCS | Mod: CPTII,S$GLB,, | Performed by: NURSE PRACTITIONER

## 2022-02-23 PROCEDURE — 3008F BODY MASS INDEX DOCD: CPT | Mod: CPTII,S$GLB,, | Performed by: NURSE PRACTITIONER

## 2022-02-23 PROCEDURE — 99999 PR PBB SHADOW E&M-EST. PATIENT-LVL III: CPT | Mod: PBBFAC,,, | Performed by: NURSE PRACTITIONER

## 2022-02-23 PROCEDURE — 1159F MED LIST DOCD IN RCRD: CPT | Mod: CPTII,S$GLB,, | Performed by: NURSE PRACTITIONER

## 2022-02-23 PROCEDURE — 3075F SYST BP GE 130 - 139MM HG: CPT | Mod: CPTII,S$GLB,, | Performed by: NURSE PRACTITIONER

## 2022-02-23 PROCEDURE — 3078F PR MOST RECENT DIASTOLIC BLOOD PRESSURE < 80 MM HG: ICD-10-PCS | Mod: CPTII,S$GLB,, | Performed by: NURSE PRACTITIONER

## 2022-02-23 PROCEDURE — 99999 PR PBB SHADOW E&M-EST. PATIENT-LVL III: ICD-10-PCS | Mod: PBBFAC,,, | Performed by: NURSE PRACTITIONER

## 2022-02-23 PROCEDURE — 99396 PR PREVENTIVE VISIT,EST,40-64: ICD-10-PCS | Mod: S$GLB,,, | Performed by: NURSE PRACTITIONER

## 2022-02-23 PROCEDURE — 3075F PR MOST RECENT SYSTOLIC BLOOD PRESS GE 130-139MM HG: ICD-10-PCS | Mod: CPTII,S$GLB,, | Performed by: NURSE PRACTITIONER

## 2022-02-23 PROCEDURE — 3008F PR BODY MASS INDEX (BMI) DOCUMENTED: ICD-10-PCS | Mod: CPTII,S$GLB,, | Performed by: NURSE PRACTITIONER

## 2022-02-23 RX ORDER — ATORVASTATIN CALCIUM 10 MG/1
10 TABLET, FILM COATED ORAL NIGHTLY
Qty: 90 TABLET | Refills: 3 | Status: SHIPPED | OUTPATIENT
Start: 2022-02-23 | End: 2022-05-20 | Stop reason: SDUPTHER

## 2022-02-23 RX ORDER — NEBIVOLOL 5 MG/1
5 TABLET ORAL DAILY
Qty: 90 TABLET | Refills: 3 | Status: SHIPPED | OUTPATIENT
Start: 2022-02-23 | End: 2022-05-05

## 2022-02-23 RX ORDER — TRIAMTERENE AND HYDROCHLOROTHIAZIDE 37.5; 25 MG/1; MG/1
1 CAPSULE ORAL EVERY MORNING
Qty: 90 CAPSULE | Refills: 2 | Status: SHIPPED | OUTPATIENT
Start: 2022-02-23

## 2022-02-23 NOTE — ASSESSMENT & PLAN NOTE
-/78  -continue Bystolic and Dyazide  -continue current medication regimen  -DASH diet, regular cardiovascular exercises, portion control  - ?weight loss  -f/u with BP logs in 2 weeks if BP is not consistently <140/90

## 2022-02-23 NOTE — ASSESSMENT & PLAN NOTE
Pt reports most recent flare up was > 2 months ago. He was prescribed short course of colchicine which relieved symptoms.     Most recent uric acid level on 11/3/21 = 11.4    Education provided on diet modification    Discussed maintenance with allopurinol if attacks increase in frequency will hold off for now.

## 2022-02-23 NOTE — ASSESSMENT & PLAN NOTE
Counseled on age appropriate medical preventative services including age appropriate cancer screenings, age appropriate eye and dental exams, over all nutritional health, need for a consistent exercise regimen, and an over all push towards maintaining a vigorous and active lifestyle.  Counseled on age appropriate vaccines and discussed upcoming health care needs based on age/gender. Discussed good sleep hygiene and stress management.    Pt declined overdue vaccines at this time.     Declined HIV screen    Reviewed recent labs from [facility] which were scanned into chart.

## 2022-02-23 NOTE — PROGRESS NOTES
HPI     Chief Complaint:  Chief Complaint   Patient presents with    Employment Physical     paperwork       Derek Joseph Weil is a 56 y.o. male with multiple medical diagnoses as listed in the medical history and problem list that presents for annual medical exam.  Pt is new to me but is known to this clinic with his last appointment being Visit date not found.      Denies New complaints     Patient is doing well and has no other major complaints today.  he is compliant with medications daily without any adverse side effects.    History     Past Medical History:  Past Medical History:   Diagnosis Date    Hypercholesteremia     Hypertension        Past Surgical History:  Past Surgical History:   Procedure Laterality Date    APPENDECTOMY         Social History:  Social History     Socioeconomic History    Marital status:    Tobacco Use    Smoking status: Never Smoker    Smokeless tobacco: Never Used   Substance and Sexual Activity    Alcohol use: No    Drug use: No       Family History:  Family History   Family history unknown: Yes       Allergies and Medications: (updated and reviewed)  Review of patient's allergies indicates:   Allergen Reactions    Aspirin Swelling    Nsaids (non-steroidal anti-inflammatory drug) Anaphylaxis    Penicillins Anaphylaxis     Other reaction(s): Swelling    Aspirin Swelling     Other reaction(s): Swelling of throat, hives    Decadron-la Hives    Dexamethasone     Ibuprofen Hives     Other reaction(s): Swelling of throat, hives    Phenytoin sodium extended      Other reaction(s): Swelling    Silver      Current Outpatient Medications   Medication Sig Dispense Refill    colchicine (COLCRYS) 0.6 mg tablet Two tabs po day one and then one tab po daily x three days with first sign of toe pain 15 tablet 0    fluticasone propionate (FLONASE) 50 mcg/actuation nasal spray 1 spray (50 mcg total) by Each Nostril route 2 (two) times daily. 16 g 1    atorvastatin  (LIPITOR) 10 MG tablet Take 1 tablet (10 mg total) by mouth every evening. 90 tablet 3    nebivoloL (BYSTOLIC) 5 MG Tab Take 1 tablet (5 mg total) by mouth once daily. 90 tablet 3    triamterene-hydrochlorothiazide 37.5-25 mg (DYAZIDE) 37.5-25 mg per capsule Take 1 capsule by mouth every morning. 90 capsule 2     No current facility-administered medications for this visit.       Exam     Review of Systems:  (as noted above)  Review of Systems   Constitutional: Negative for chills, fever and unexpected weight change.   HENT: Negative for sore throat and trouble swallowing.    Eyes: Negative for visual disturbance.   Respiratory: Negative for cough, chest tightness, shortness of breath and wheezing.    Cardiovascular: Negative for chest pain, palpitations and leg swelling.   Gastrointestinal: Negative for abdominal pain, constipation, diarrhea, nausea and vomiting.   Endocrine: Negative for polydipsia, polyphagia and polyuria.   Genitourinary: Negative for decreased urine volume, dysuria and hematuria.   Musculoskeletal: Negative for arthralgias and myalgias.   Skin: Negative for color change and rash.   Neurological: Negative for dizziness, weakness, light-headedness and headaches.   Psychiatric/Behavioral: Negative for confusion, decreased concentration, sleep disturbance and suicidal ideas.       Physical Exam:   Physical Exam  Constitutional:       General: He is not in acute distress.     Appearance: He is obese. He is not ill-appearing.   HENT:      Head: Normocephalic and atraumatic.   Eyes:      General: No scleral icterus.  Neck:      Vascular: No carotid bruit.   Cardiovascular:      Rate and Rhythm: Normal rate and regular rhythm.      Pulses: Normal pulses.      Heart sounds: No murmur heard.    No friction rub. No gallop.   Pulmonary:      Effort: No respiratory distress.      Breath sounds: No wheezing.   Chest:      Chest wall: No tenderness.   Musculoskeletal:         General: No signs of injury.       Cervical back: No rigidity or tenderness.   Lymphadenopathy:      Cervical: No cervical adenopathy.   Skin:     Capillary Refill: Capillary refill takes 2 to 3 seconds.      Findings: No rash.   Neurological:      Mental Status: He is alert.      Cranial Nerves: No cranial nerve deficit.      Sensory: No sensory deficit.      Motor: No weakness.   Psychiatric:         Mood and Affect: Mood normal.       Vitals:    02/23/22 1323   BP: 132/78   Pulse: 72   Temp: 97.9 °F (36.6 °C)   TempSrc: Oral   SpO2: 96%   Weight: 115.1 kg (253 lb 10.2 oz)      Body mass index is 35.38 kg/m².    Assessment & Plan   (all problems are new to me)      Problem List Items Addressed This Visit        Cardiac/Vascular    Hypertension    Current Assessment & Plan     -/78  -continue Bystolic and Dyazide  -continue current medication regimen  -DASH diet, regular cardiovascular exercises, portion control  - ?weight loss  -f/u with BP logs in 2 weeks if BP is not consistently <140/90               Relevant Medications    nebivoloL (BYSTOLIC) 5 MG Tab    triamterene-hydrochlorothiazide 37.5-25 mg (DYAZIDE) 37.5-25 mg per capsule    Hypercholesteremia    Current Assessment & Plan     discussed ways to lower triglycerides such as cutting simple sugars out of diet (white breads, candies, cookies, cakes, etc.) and reducing/eliminating intake of highly processed trans fatty acids.   Exercise 30 minutes a day for 4-5 days a week.   Eat more fiber.    Continue statin.               Orthopedic    Chronic gout without tophus    Current Assessment & Plan     Pt reports most recent flare up was > 2 months ago. He was prescribed short course of colchicine which relieved symptoms.     Most recent uric acid level on 11/3/21 = 11.4    Education provided on diet modification    Discussed maintenance with allopurinol if attacks increase in frequency will hold off for now.                 Other    MARISA on CPAP    Overview     Patient had PSG 7/23/2019 MIKE  AHI 23 REM AHI 48  CPAP titration 8/2019 effective tx 12 cwp    Difficulty maintaining sleep despite cpap. Manometer 12 cm H2O=12 cm H2O. Equipment without apnea sensitivity. Patient self purchased equipment and would like to update this. Plan to titrate/adjust per residual apnea           Current Assessment & Plan     CPAP compliance: yes.   The patient reports that they continue to benefit from regular use of their CPAP machine.  We discussed the potential ramifications of untreated sleep apnea, which could include daytime sleepiness, hypertension, heart disease including CHF, sudden death while sleeping and/or stroke. The patient was advised to abstain from driving should they feel sleepy or drowsy.  We discussed potential treatment options, which could include weight loss, body positioning, continuous positive airway pressure (CPAP), or referral for surgical consideration.              Routine general medical examination at a health care facility - Primary    Current Assessment & Plan     Counseled on age appropriate medical preventative services including age appropriate cancer screenings, age appropriate eye and dental exams, over all nutritional health, need for a consistent exercise regimen, and an over all push towards maintaining a vigorous and active lifestyle.  Counseled on age appropriate vaccines and discussed upcoming health care needs based on age/gender. Discussed good sleep hygiene and stress management.    Pt declined overdue vaccines at this time.     Declined HIV screen    Reviewed recent labs from [facility] which were scanned into chart.              Other Visit Diagnoses     Essential hypertension        Relevant Medications    nebivoloL (BYSTOLIC) 5 MG Tab    triamterene-hydrochlorothiazide 37.5-25 mg (DYAZIDE) 37.5-25 mg per capsule    Mixed hyperlipidemia        Relevant Medications    atorvastatin (LIPITOR) 10 MG tablet          --------------------------------------------    Health  Maintenance:  Health Maintenance       Date Due Completion Date    COVID-19 Vaccine (1) Never done ---    HIV Screening Never done ---    TETANUS VACCINE Never done ---    Shingles Vaccine (1 of 2) Never done ---    Influenza Vaccine (1) Never done ---    Lipid Panel 08/20/2026 8/20/2021    Colorectal Cancer Screening 02/14/2027 2/14/2017    Pneumococcal Vaccines (Age 0-64) (2 of 2 - PPSV23) 11/19/2028 9/19/2019          Health maintenance reviewed. Vaccines offered patient declined at this time. Pt declined HIV screen    Follow Up:  Follow up in about 3 months (around 5/23/2022), or if symptoms worsen or fail to improve.      The patient expressed understanding and no barriers to adherence were identified.      1. The patient indicates understanding of these issues and agrees with the plan. Brief care plan is updated and reviewed with the patient as applicable.      2. The patient is given an After Visit Summary that lists all medications with directions, allergies, education, orders placed during this encounter and follow-up instructions.      3. I have reviewed the patient's medical information including past medical, family, and social history sections including the medications and allergies.      4. We discussed the patient's current medications.     This note was created by combination of typed  and MModal dictation.  Transcription errors may be present.  If there are any questions, please contact me.       Jovan Guzman NP

## 2022-02-23 NOTE — ASSESSMENT & PLAN NOTE
discussed ways to lower triglycerides such as cutting simple sugars out of diet (white breads, candies, cookies, cakes, etc.) and reducing/eliminating intake of highly processed trans fatty acids.   Exercise 30 minutes a day for 4-5 days a week.   Eat more fiber.    Continue statin.

## 2022-02-23 NOTE — ASSESSMENT & PLAN NOTE
CPAP compliance: yes.   The patient reports that they continue to benefit from regular use of their CPAP machine.  We discussed the potential ramifications of untreated sleep apnea, which could include daytime sleepiness, hypertension, heart disease including CHF, sudden death while sleeping and/or stroke. The patient was advised to abstain from driving should they feel sleepy or drowsy.  We discussed potential treatment options, which could include weight loss, body positioning, continuous positive airway pressure (CPAP), or referral for surgical consideration.

## 2022-03-05 ENCOUNTER — PATIENT MESSAGE (OUTPATIENT)
Dept: FAMILY MEDICINE | Facility: CLINIC | Age: 57
End: 2022-03-05
Payer: COMMERCIAL

## 2022-05-05 ENCOUNTER — OFFICE VISIT (OUTPATIENT)
Dept: FAMILY MEDICINE | Facility: CLINIC | Age: 57
End: 2022-05-05
Payer: COMMERCIAL

## 2022-05-05 VITALS
DIASTOLIC BLOOD PRESSURE: 74 MMHG | SYSTOLIC BLOOD PRESSURE: 132 MMHG | HEART RATE: 71 BPM | WEIGHT: 251.44 LBS | TEMPERATURE: 98 F | OXYGEN SATURATION: 95 % | BODY MASS INDEX: 35.07 KG/M2

## 2022-05-05 DIAGNOSIS — I10 PRIMARY HYPERTENSION: ICD-10-CM

## 2022-05-05 DIAGNOSIS — E78.00 HYPERCHOLESTEREMIA: ICD-10-CM

## 2022-05-05 DIAGNOSIS — M1A.9XX0 CHRONIC GOUT INVOLVING TOE WITHOUT TOPHUS, UNSPECIFIED CAUSE, UNSPECIFIED LATERALITY: ICD-10-CM

## 2022-05-05 DIAGNOSIS — E66.01 SEVERE OBESITY (BMI 35.0-39.9) WITH COMORBIDITY: Primary | ICD-10-CM

## 2022-05-05 PROCEDURE — 99213 PR OFFICE/OUTPT VISIT, EST, LEVL III, 20-29 MIN: ICD-10-PCS | Mod: S$GLB,,, | Performed by: NURSE PRACTITIONER

## 2022-05-05 PROCEDURE — 3078F DIAST BP <80 MM HG: CPT | Mod: CPTII,S$GLB,, | Performed by: NURSE PRACTITIONER

## 2022-05-05 PROCEDURE — 99999 PR PBB SHADOW E&M-EST. PATIENT-LVL III: CPT | Mod: PBBFAC,,, | Performed by: NURSE PRACTITIONER

## 2022-05-05 PROCEDURE — 1159F MED LIST DOCD IN RCRD: CPT | Mod: CPTII,S$GLB,, | Performed by: NURSE PRACTITIONER

## 2022-05-05 PROCEDURE — 1159F PR MEDICATION LIST DOCUMENTED IN MEDICAL RECORD: ICD-10-PCS | Mod: CPTII,S$GLB,, | Performed by: NURSE PRACTITIONER

## 2022-05-05 PROCEDURE — 3008F BODY MASS INDEX DOCD: CPT | Mod: CPTII,S$GLB,, | Performed by: NURSE PRACTITIONER

## 2022-05-05 PROCEDURE — 3075F SYST BP GE 130 - 139MM HG: CPT | Mod: CPTII,S$GLB,, | Performed by: NURSE PRACTITIONER

## 2022-05-05 PROCEDURE — 3078F PR MOST RECENT DIASTOLIC BLOOD PRESSURE < 80 MM HG: ICD-10-PCS | Mod: CPTII,S$GLB,, | Performed by: NURSE PRACTITIONER

## 2022-05-05 PROCEDURE — 99999 PR PBB SHADOW E&M-EST. PATIENT-LVL III: ICD-10-PCS | Mod: PBBFAC,,, | Performed by: NURSE PRACTITIONER

## 2022-05-05 PROCEDURE — 3008F PR BODY MASS INDEX (BMI) DOCUMENTED: ICD-10-PCS | Mod: CPTII,S$GLB,, | Performed by: NURSE PRACTITIONER

## 2022-05-05 PROCEDURE — 3075F PR MOST RECENT SYSTOLIC BLOOD PRESS GE 130-139MM HG: ICD-10-PCS | Mod: CPTII,S$GLB,, | Performed by: NURSE PRACTITIONER

## 2022-05-05 PROCEDURE — 99213 OFFICE O/P EST LOW 20 MIN: CPT | Mod: S$GLB,,, | Performed by: NURSE PRACTITIONER

## 2022-05-05 NOTE — PROGRESS NOTES
"  HPI     Chief Complaint:  Chief Complaint   Patient presents with    discuss meds     Bystolic       Alexander Joseph Weil is a 56 y.o. male with multiple medical diagnoses as listed in the medical history and problem list that presents for hypertension management.  Pt is known to me with his his last appointment 2/23/2022.      Hypertension  This is a new problem. The current episode started more than 1 year ago. The problem is unchanged. The problem is controlled. Pertinent negatives include no anxiety, blurred vision, chest pain, headaches, malaise/fatigue, neck pain, orthopnea, palpitations, peripheral edema, PND, shortness of breath or sweats. There are no associated agents to hypertension. Risk factors for coronary artery disease include dyslipidemia, family history and obesity. Past treatments include angiotensin blockers. The current treatment provides moderate improvement. Compliance problems include diet and exercise.      Bystolic causes fatigue and headaches. Pt states he has not taken Bystolic in the past 2 weeks due to AE. "my BP has been just fine without it. I feel much better since I have stopped taking it."     he is compliant with medications daily without any adverse side effects.    History     Past Medical History:  Past Medical History:   Diagnosis Date    Hypercholesteremia     Hypertension        Past Surgical History:  Past Surgical History:   Procedure Laterality Date    APPENDECTOMY         Social History:  Social History     Socioeconomic History    Marital status:    Tobacco Use    Smoking status: Never Smoker    Smokeless tobacco: Never Used   Substance and Sexual Activity    Alcohol use: No    Drug use: No       Family History:  Family History   Family history unknown: Yes       Allergies and Medications: (updated and reviewed)  Review of patient's allergies indicates:   Allergen Reactions    Aspirin Swelling    Nsaids (non-steroidal anti-inflammatory drug) Anaphylaxis "    Penicillins Anaphylaxis     Other reaction(s): Swelling    Aspirin Swelling     Other reaction(s): Swelling of throat, hives    Decadron-la Hives    Dexamethasone     Ibuprofen Hives     Other reaction(s): Swelling of throat, hives    Phenytoin sodium extended      Other reaction(s): Swelling    Silver      Current Outpatient Medications   Medication Sig Dispense Refill    atorvastatin (LIPITOR) 10 MG tablet Take 1 tablet (10 mg total) by mouth every evening. 90 tablet 3    colchicine (COLCRYS) 0.6 mg tablet Two tabs po day one and then one tab po daily x three days with first sign of toe pain 15 tablet 0    fluticasone propionate (FLONASE) 50 mcg/actuation nasal spray 1 spray (50 mcg total) by Each Nostril route 2 (two) times daily. 16 g 1    triamterene-hydrochlorothiazide 37.5-25 mg (DYAZIDE) 37.5-25 mg per capsule Take 1 capsule by mouth every morning. 90 capsule 2     No current facility-administered medications for this visit.       Exam     Review of Systems:  (as noted above)  Review of Systems   Constitutional: Positive for fatigue (improved since d/c Bystolic). Negative for chills, fever, malaise/fatigue and unexpected weight change.   HENT: Negative for sore throat and trouble swallowing.    Eyes: Negative for blurred vision and visual disturbance.   Respiratory: Negative for cough, chest tightness, shortness of breath and wheezing.    Cardiovascular: Negative for chest pain, palpitations, orthopnea and PND.   Gastrointestinal: Negative for anal bleeding and blood in stool.   Endocrine: Negative for polydipsia and polyphagia.   Genitourinary: Negative for decreased urine volume and hematuria.   Musculoskeletal: Negative for neck pain.   Skin: Negative for rash and wound.   Neurological: Negative for seizures, speech difficulty and headaches.   Psychiatric/Behavioral: Negative for confusion, decreased concentration and suicidal ideas.       Physical Exam:   Physical Exam  Constitutional:        General: He is not in acute distress.     Appearance: He is obese. He is not ill-appearing or diaphoretic.   HENT:      Head: Normocephalic and atraumatic.   Eyes:      General: No scleral icterus.     Pupils: Pupils are equal, round, and reactive to light.   Neck:      Vascular: No carotid bruit.   Cardiovascular:      Rate and Rhythm: Normal rate and regular rhythm.      Pulses: Normal pulses.      Heart sounds: No murmur heard.    No friction rub. No gallop.   Pulmonary:      Effort: No respiratory distress.      Breath sounds: No wheezing.   Chest:      Chest wall: No tenderness.   Musculoskeletal:         General: No signs of injury.      Cervical back: No rigidity or tenderness.   Lymphadenopathy:      Cervical: No cervical adenopathy.   Skin:     Capillary Refill: Capillary refill takes 2 to 3 seconds.      Findings: No rash.   Neurological:      Mental Status: He is alert.      Cranial Nerves: No cranial nerve deficit.      Sensory: No sensory deficit.      Motor: No weakness.   Psychiatric:         Mood and Affect: Mood normal.       Vitals:    05/05/22 1630   BP: 132/74   BP Location: Left arm   Pulse: 71   Temp: 98.4 °F (36.9 °C)   TempSrc: Oral   SpO2: 95%   Weight: 114 kg (251 lb 7 oz)      Body mass index is 35.07 kg/m².    Assessment & Plan     Problem List Items Addressed This Visit        Cardiac/Vascular    Hypercholesteremia    Current Assessment & Plan     discussed ways to lower triglycerides such as cutting simple sugars out of diet (white breads, candies, cookies, cakes, etc.) and reducing/eliminating intake of highly processed trans fatty acids.   Exercise 30 minutes a day for 4-5 days a week.   Eat more fiber.    Continue statin.            Hypertension    Current Assessment & Plan     /74 (BP Location: Left arm)   Pulse 71   Temp 98.4 °F (36.9 °C) (Oral)   Wt 114 kg (251 lb 7 oz)   SpO2 95%   BMI 35.07 kg/m²      -continue Dyazide  -d/c Bystolic due to AE  -DASH diet, regular  cardiovascular exercises, portion control  - ?weight loss  -f/u with BP logs in 2 weeks if BP is not consistently <140/90    ED precautions given.   Notify provider if symptoms do not resolve or increase in severity.   Patient verbalizes understanding and agrees with plan of care.                Endocrine    Severe obesity (BMI 35.0-39.9) with comorbidity - Primary    Current Assessment & Plan     We discussed weight issues and safe, effective ways of losing pounds, includin) diet:  low carbohydrate, low fat diet, stay away from fast food, fried and processed food, use whole grain, lot of fruits and vegetables, use healthy fat such as avocado, nuts and olive oil in reasonable quantity, stay away from sodas. Regular meals with lean proteins.  2) physical activity: ideally 150 min a week, with cardiovascular and resistance activity.  Patient was encouraged to set realistic attainable goals for weight loss, and we will follow up periodically.    Provided pt with educational material.     Discussed using clean creations              Orthopedic    Chronic gout without tophus    Current Assessment & Plan     The current medical regimen is effective;  continue present plan and medications.                   --------------------------------------------      Health Maintenance:  Health Maintenance       Date Due Completion Date    COVID-19 Vaccine (1) Never done ---    HIV Screening Never done ---    TETANUS VACCINE Never done ---    Shingles Vaccine (1 of 2) Never done ---    Influenza Vaccine (Season Ended) 2022 ---    Lipid Panel 2026    Colorectal Cancer Screening 2027          Health maintenance reviewed. Vaccines offered patient declined at this time.        Follow Up:  Follow up in about 2 weeks (around 2022), or if symptoms worsen or fail to improve.      The patient expressed understanding and no barriers to adherence were identified.      - The patient indicates  understanding of these issues and agrees with the plan. Brief care plan is updated and reviewed with the patient as applicable.      - The patient is given an After Visit Summary that lists all medications with directions, allergies, education, orders placed during this encounter and follow-up instructions.      - I have reviewed the patient's medical information including past medical, family, and social history sections including the medications and allergies.      - We discussed the patient's current medications.     This note was created by combination of typed  and MModal dictation.  Transcription errors may be present.  If there are any questions, please contact me.       Jovan Guzman NP

## 2022-05-05 NOTE — ASSESSMENT & PLAN NOTE
/74 (BP Location: Left arm)   Pulse 71   Temp 98.4 °F (36.9 °C) (Oral)   Wt 114 kg (251 lb 7 oz)   SpO2 95%   BMI 35.07 kg/m²      -continue Dyazide  -d/c Bystolic due to AE  -DASH diet, regular cardiovascular exercises, portion control  - ?weight loss  -f/u with BP logs in 2 weeks if BP is not consistently <140/90    ED precautions given.   Notify provider if symptoms do not resolve or increase in severity.   Patient verbalizes understanding and agrees with plan of care.

## 2022-05-05 NOTE — ASSESSMENT & PLAN NOTE
We discussed weight issues and safe, effective ways of losing pounds, includin) diet:  low carbohydrate, low fat diet, stay away from fast food, fried and processed food, use whole grain, lot of fruits and vegetables, use healthy fat such as avocado, nuts and olive oil in reasonable quantity, stay away from sodas. Regular meals with lean proteins.  2) physical activity: ideally 150 min a week, with cardiovascular and resistance activity.  Patient was encouraged to set realistic attainable goals for weight loss, and we will follow up periodically.    Provided pt with educational material.     Discussed using clean creations

## 2022-05-20 ENCOUNTER — PATIENT MESSAGE (OUTPATIENT)
Dept: FAMILY MEDICINE | Facility: CLINIC | Age: 57
End: 2022-05-20
Payer: COMMERCIAL

## 2022-05-20 DIAGNOSIS — E78.2 MIXED HYPERLIPIDEMIA: ICD-10-CM

## 2022-05-20 RX ORDER — ATORVASTATIN CALCIUM 10 MG/1
10 TABLET, FILM COATED ORAL NIGHTLY
Qty: 90 TABLET | Refills: 3 | Status: SHIPPED | OUTPATIENT
Start: 2022-05-20

## 2022-05-30 PROBLEM — Z00.00 ROUTINE GENERAL MEDICAL EXAMINATION AT A HEALTH CARE FACILITY: Status: RESOLVED | Noted: 2022-02-23 | Resolved: 2022-05-30

## 2022-06-14 ENCOUNTER — OFFICE VISIT (OUTPATIENT)
Dept: FAMILY MEDICINE | Facility: CLINIC | Age: 57
End: 2022-06-14
Payer: COMMERCIAL

## 2022-06-14 VITALS
TEMPERATURE: 99 F | HEIGHT: 71 IN | SYSTOLIC BLOOD PRESSURE: 148 MMHG | DIASTOLIC BLOOD PRESSURE: 96 MMHG | BODY MASS INDEX: 32.17 KG/M2 | OXYGEN SATURATION: 97 % | WEIGHT: 229.81 LBS | HEART RATE: 75 BPM

## 2022-06-14 DIAGNOSIS — F41.9 ANXIETY: Primary | ICD-10-CM

## 2022-06-14 DIAGNOSIS — F43.9 STRESS: ICD-10-CM

## 2022-06-14 DIAGNOSIS — Z13.31 POSITIVE DEPRESSION SCREENING: ICD-10-CM

## 2022-06-14 DIAGNOSIS — F33.9 EPISODE OF RECURRENT MAJOR DEPRESSIVE DISORDER, UNSPECIFIED DEPRESSION EPISODE SEVERITY: ICD-10-CM

## 2022-06-14 PROCEDURE — 99999 PR PBB SHADOW E&M-EST. PATIENT-LVL V: ICD-10-PCS | Mod: PBBFAC,,, | Performed by: NURSE PRACTITIONER

## 2022-06-14 PROCEDURE — 3080F DIAST BP >= 90 MM HG: CPT | Mod: CPTII,S$GLB,, | Performed by: NURSE PRACTITIONER

## 2022-06-14 PROCEDURE — 99999 PR PBB SHADOW E&M-EST. PATIENT-LVL V: CPT | Mod: PBBFAC,,, | Performed by: NURSE PRACTITIONER

## 2022-06-14 PROCEDURE — 1160F RVW MEDS BY RX/DR IN RCRD: CPT | Mod: CPTII,S$GLB,, | Performed by: NURSE PRACTITIONER

## 2022-06-14 PROCEDURE — 99214 PR OFFICE/OUTPT VISIT, EST, LEVL IV, 30-39 MIN: ICD-10-PCS | Mod: S$GLB,,, | Performed by: NURSE PRACTITIONER

## 2022-06-14 PROCEDURE — 99214 OFFICE O/P EST MOD 30 MIN: CPT | Mod: S$GLB,,, | Performed by: NURSE PRACTITIONER

## 2022-06-14 PROCEDURE — 1159F PR MEDICATION LIST DOCUMENTED IN MEDICAL RECORD: ICD-10-PCS | Mod: CPTII,S$GLB,, | Performed by: NURSE PRACTITIONER

## 2022-06-14 PROCEDURE — 3080F PR MOST RECENT DIASTOLIC BLOOD PRESSURE >= 90 MM HG: ICD-10-PCS | Mod: CPTII,S$GLB,, | Performed by: NURSE PRACTITIONER

## 2022-06-14 PROCEDURE — 3008F PR BODY MASS INDEX (BMI) DOCUMENTED: ICD-10-PCS | Mod: CPTII,S$GLB,, | Performed by: NURSE PRACTITIONER

## 2022-06-14 PROCEDURE — 3077F SYST BP >= 140 MM HG: CPT | Mod: CPTII,S$GLB,, | Performed by: NURSE PRACTITIONER

## 2022-06-14 PROCEDURE — 3077F PR MOST RECENT SYSTOLIC BLOOD PRESSURE >= 140 MM HG: ICD-10-PCS | Mod: CPTII,S$GLB,, | Performed by: NURSE PRACTITIONER

## 2022-06-14 PROCEDURE — 1160F PR REVIEW ALL MEDS BY PRESCRIBER/CLIN PHARMACIST DOCUMENTED: ICD-10-PCS | Mod: CPTII,S$GLB,, | Performed by: NURSE PRACTITIONER

## 2022-06-14 PROCEDURE — 1159F MED LIST DOCD IN RCRD: CPT | Mod: CPTII,S$GLB,, | Performed by: NURSE PRACTITIONER

## 2022-06-14 PROCEDURE — 3008F BODY MASS INDEX DOCD: CPT | Mod: CPTII,S$GLB,, | Performed by: NURSE PRACTITIONER

## 2022-06-14 RX ORDER — FLUOXETINE HYDROCHLORIDE 20 MG/1
20 CAPSULE ORAL DAILY
Qty: 30 CAPSULE | Refills: 11 | Status: SHIPPED | OUTPATIENT
Start: 2022-06-14 | End: 2023-06-14

## 2022-06-14 RX ORDER — HYDROXYZINE HYDROCHLORIDE 25 MG/1
25 TABLET, FILM COATED ORAL 3 TIMES DAILY
Qty: 90 TABLET | Refills: 0 | Status: SHIPPED | OUTPATIENT
Start: 2022-06-14 | End: 2022-07-14

## 2022-06-14 NOTE — PROGRESS NOTES
Chief Complaint  Chief Complaint   Patient presents with    Letter for School/Work    Stress       HPI    HPI   Mr. Derek Joseph Weil is a 56 y.o. female with medical problems as listed below. The patient presents to clinic with c/o stress, anxiety and depression for the last couple of weeks. The patient has been dealing with allegations that have been causing a stress on him and his family. The patient states that he has not been able to eat and has not been doing things that he normally does. The patient denies any SI/HI at this time.     PAST MEDICAL HISTORY:  Past Medical History:   Diagnosis Date    Hypercholesteremia     Hypertension        PAST SURGICAL HISTORY:  Past Surgical History:   Procedure Laterality Date    APPENDECTOMY         SOCIAL HISTORY:  Social History     Socioeconomic History    Marital status:    Tobacco Use    Smoking status: Never Smoker    Smokeless tobacco: Never Used   Substance and Sexual Activity    Alcohol use: No    Drug use: No       FAMILY HISTORY:  Family History   Family history unknown: Yes       ALLERGIES AND MEDICATIONS: updated and reviewed.  Review of patient's allergies indicates:   Allergen Reactions    Aspirin Swelling    Nsaids (non-steroidal anti-inflammatory drug) Anaphylaxis    Penicillins Anaphylaxis     Other reaction(s): Swelling    Aspirin Swelling     Other reaction(s): Swelling of throat, hives    Decadron-la Hives    Dexamethasone     Ibuprofen Hives     Other reaction(s): Swelling of throat, hives    Phenytoin sodium extended      Other reaction(s): Swelling    Silver      Current Outpatient Medications   Medication Sig Dispense Refill    atorvastatin (LIPITOR) 10 MG tablet Take 1 tablet (10 mg total) by mouth every evening. 90 tablet 3    fluticasone propionate (FLONASE) 50 mcg/actuation nasal spray 1 spray (50 mcg total) by Each Nostril route 2 (two) times daily. 16 g 1    triamterene-hydrochlorothiazide 37.5-25 mg (DYAZIDE)  37.5-25 mg per capsule Take 1 capsule by mouth every morning. 90 capsule 2    colchicine (COLCRYS) 0.6 mg tablet Two tabs po day one and then one tab po daily x three days with first sign of toe pain (Patient not taking: Reported on 6/14/2022) 15 tablet 0    FLUoxetine 20 MG capsule Take 1 capsule (20 mg total) by mouth once daily. 30 capsule 11    hydrOXYzine HCL (ATARAX) 25 MG tablet Take 1 tablet (25 mg total) by mouth 3 (three) times daily. 90 tablet 0     No current facility-administered medications for this visit.       Patient Care Team:  Monty Pena MD as PCP - General (Internal Medicine)  Arvin Connolly MA (Inactive)  Kristin Jeffrey MA as Care Coordinator  [facility] (Gastroenterology)    ROS  Review of Systems   Constitutional: Negative for chills, fatigue, fever and unexpected weight change.   HENT: Negative for congestion, ear pain, sore throat and voice change.    Eyes: Negative for photophobia, pain, discharge and visual disturbance.   Respiratory: Negative for cough, shortness of breath and wheezing.    Cardiovascular: Negative for chest pain, palpitations and leg swelling.   Gastrointestinal: Negative for abdominal pain, blood in stool, constipation, diarrhea, nausea and vomiting.   Genitourinary: Negative for dysuria and frequency.   Musculoskeletal: Negative for gait problem, joint swelling and neck stiffness.   Skin: Negative for color change and rash.   Neurological: Negative for seizures, weakness and headaches.   Hematological: Negative for adenopathy. Does not bruise/bleed easily.   Psychiatric/Behavioral: Positive for dysphoric mood and sleep disturbance. Negative for behavioral problems, self-injury and suicidal ideas. The patient is nervous/anxious.        GAD7 6/14/2022   1. Feeling nervous, anxious, or on edge? 3   2. Not being able to stop or control worrying? 3   3. Worrying too much about different things? 3   4. Trouble  "relaxing? 3   5. Being so restless that it is hard to sit still? 3   6. Becoming easily annoyed or irritable? 3   7. Feeling afraid as if something awful might happen? 3   8. If you checked off any problems, how difficult have these problems made it for you to do your work, take care of things at home, or get along with other people? 3   DINO-7 Score 21     PHQ9 6/14/2022   Total Score 27       Physical Exam  Vitals:    06/14/22 1523   BP: (!) 148/96   Pulse: 75   Temp: 98.6 °F (37 °C)   TempSrc: Oral   SpO2: 97%   Weight: 104.2 kg (229 lb 13.3 oz)   Height: 5' 11" (1.803 m)    Body mass index is 32.05 kg/m².  Weight: 104.2 kg (229 lb 13.3 oz)   Height: 5' 11" (180.3 cm)     Physical Exam  Constitutional:       Appearance: He is well-developed.   HENT:      Head: Normocephalic and atraumatic.   Eyes:      Conjunctiva/sclera: Conjunctivae normal.      Pupils: Pupils are equal, round, and reactive to light.   Neck:      Thyroid: No thyromegaly.   Cardiovascular:      Rate and Rhythm: Normal rate.   Pulmonary:      Effort: Pulmonary effort is normal.   Musculoskeletal:         General: Normal range of motion.      Cervical back: Normal range of motion and neck supple.   Skin:     General: Skin is warm and dry.   Neurological:      Mental Status: He is alert and oriented to person, place, and time.   Psychiatric:         Behavior: Behavior normal.         Thought Content: Thought content normal.         Judgment: Judgment normal.         Health Maintenance       Date Due Completion Date    COVID-19 Vaccine (1) Never done ---    HIV Screening Never done ---    TETANUS VACCINE Never done ---    Shingles Vaccine (1 of 2) Never done ---    Influenza Vaccine (Season Ended) 09/01/2022 ---    Lipid Panel 08/20/2026 8/20/2021    Colorectal Cancer Screening 02/14/2027 2/14/2017      Health maintenance reviewed at this time.     Assessment & Plan  Anxiety  -     FLUoxetine 20 MG capsule; Take 1 capsule (20 mg total) by mouth once " daily.  Dispense: 30 capsule; Refill: 11  -     hydrOXYzine HCL (ATARAX) 25 MG tablet; Take 1 tablet (25 mg total) by mouth 3 (three) times daily.  Dispense: 90 tablet; Refill: 0    Stress  -     FLUoxetine 20 MG capsule; Take 1 capsule (20 mg total) by mouth once daily.  Dispense: 30 capsule; Refill: 11  -     hydrOXYzine HCL (ATARAX) 25 MG tablet; Take 1 tablet (25 mg total) by mouth 3 (three) times daily.  Dispense: 90 tablet; Refill: 0    Episode of recurrent major depressive disorder, unspecified depression episode severity  -     FLUoxetine 20 MG capsule; Take 1 capsule (20 mg total) by mouth once daily.  Dispense: 30 capsule; Refill: 11  -     Ambulatory referral/consult to Psychiatry; Future; Expected date: 06/21/2022    Positive depression screening  Comments:  I have reviewed the positive depression score which warrants active treatment with psychotherapy and/or medications.       The patient needs [facility] paper work filled out. Will fill out for the patient and fax.    Follow-up: No follow-ups on file.      I have reviewed the positive depression score which warrants active treatment with psychotherapy and/or medications.

## 2022-10-14 ENCOUNTER — PATIENT OUTREACH (OUTPATIENT)
Dept: ADMINISTRATIVE | Facility: HOSPITAL | Age: 57
End: 2022-10-14
Payer: COMMERCIAL

## 2022-10-14 ENCOUNTER — PATIENT MESSAGE (OUTPATIENT)
Dept: ADMINISTRATIVE | Facility: HOSPITAL | Age: 57
End: 2022-10-14
Payer: COMMERCIAL

## 2022-10-14 NOTE — PROGRESS NOTES
LVM for pt to call back and schedule a nurse BP visit, visit with PCP or give a home BP reading. Immunization's updated.

## 2022-12-05 ENCOUNTER — PATIENT OUTREACH (OUTPATIENT)
Dept: ADMINISTRATIVE | Facility: HOSPITAL | Age: 57
End: 2022-12-05
Payer: COMMERCIAL

## 2022-12-05 ENCOUNTER — PATIENT MESSAGE (OUTPATIENT)
Dept: ADMINISTRATIVE | Facility: HOSPITAL | Age: 57
End: 2022-12-05
Payer: COMMERCIAL

## 2022-12-05 DIAGNOSIS — R73.03 PREDIABETES: Primary | ICD-10-CM

## 2022-12-05 NOTE — PROGRESS NOTES
LVM for pt to call back and schedule a nurse BP visit, visit with PCP or give a home BP reading. BP report updated. Immunization's updated.
